# Patient Record
Sex: MALE | Race: WHITE | NOT HISPANIC OR LATINO | Employment: OTHER | URBAN - METROPOLITAN AREA
[De-identification: names, ages, dates, MRNs, and addresses within clinical notes are randomized per-mention and may not be internally consistent; named-entity substitution may affect disease eponyms.]

---

## 2017-02-13 ENCOUNTER — AMBULATORY - HEALTHEAST (OUTPATIENT)
Dept: PHYSICAL MEDICINE AND REHAB | Facility: CLINIC | Age: 72
End: 2017-02-13

## 2017-04-06 ENCOUNTER — HOSPITAL ENCOUNTER (OUTPATIENT)
Dept: PHYSICAL MEDICINE AND REHAB | Facility: CLINIC | Age: 72
Discharge: HOME | End: 2017-04-06
Attending: NURSE PRACTITIONER

## 2017-04-06 DIAGNOSIS — M53.3 CHRONIC SI JOINT PAIN: ICD-10-CM

## 2017-04-06 DIAGNOSIS — M54.42 CHRONIC LEFT-SIDED LOW BACK PAIN WITH LEFT-SIDED SCIATICA: ICD-10-CM

## 2017-04-06 DIAGNOSIS — G89.29 CHRONIC SI JOINT PAIN: ICD-10-CM

## 2017-04-06 DIAGNOSIS — G89.29 CHRONIC LEFT-SIDED LOW BACK PAIN WITH LEFT-SIDED SCIATICA: ICD-10-CM

## 2017-04-06 DIAGNOSIS — M43.16 SPONDYLOLISTHESIS OF LUMBAR REGION: ICD-10-CM

## 2017-04-10 ENCOUNTER — HOSPITAL ENCOUNTER (OUTPATIENT)
Dept: PHYSICAL MEDICINE AND REHAB | Facility: CLINIC | Age: 72
Discharge: HOME | End: 2017-04-10
Attending: PHYSICAL MEDICINE & REHABILITATION

## 2017-04-10 DIAGNOSIS — G89.29 CHRONIC LEFT-SIDED LOW BACK PAIN WITH LEFT-SIDED SCIATICA: ICD-10-CM

## 2017-04-10 DIAGNOSIS — G89.29 CHRONIC SI JOINT PAIN: ICD-10-CM

## 2017-04-10 DIAGNOSIS — M53.3 CHRONIC SI JOINT PAIN: ICD-10-CM

## 2017-04-10 DIAGNOSIS — M54.42 CHRONIC LEFT-SIDED LOW BACK PAIN WITH LEFT-SIDED SCIATICA: ICD-10-CM

## 2017-05-01 ENCOUNTER — HOSPITAL ENCOUNTER (OUTPATIENT)
Dept: PHYSICAL MEDICINE AND REHAB | Facility: CLINIC | Age: 72
Discharge: HOME | End: 2017-05-01
Attending: PHYSICIAN ASSISTANT

## 2017-05-01 DIAGNOSIS — M54.16 LUMBAR RADICULITIS: ICD-10-CM

## 2017-05-01 DIAGNOSIS — M47.816 LUMBAR FACET ARTHROPATHY: ICD-10-CM

## 2017-05-01 DIAGNOSIS — Z98.1 S/P LUMBAR FUSION: ICD-10-CM

## 2017-05-04 ENCOUNTER — AMBULATORY - HEALTHEAST (OUTPATIENT)
Dept: PHYSICAL MEDICINE AND REHAB | Facility: CLINIC | Age: 72
End: 2017-05-04

## 2017-05-05 ENCOUNTER — COMMUNICATION - HEALTHEAST (OUTPATIENT)
Dept: PHYSICAL MEDICINE AND REHAB | Facility: CLINIC | Age: 72
End: 2017-05-05

## 2017-05-05 ENCOUNTER — AMBULATORY - HEALTHEAST (OUTPATIENT)
Dept: PHYSICAL MEDICINE AND REHAB | Facility: CLINIC | Age: 72
End: 2017-05-05

## 2017-05-09 ENCOUNTER — HOSPITAL ENCOUNTER (OUTPATIENT)
Dept: PHYSICAL MEDICINE AND REHAB | Facility: CLINIC | Age: 72
Discharge: HOME | End: 2017-05-09
Attending: PHYSICIAN ASSISTANT

## 2017-05-09 DIAGNOSIS — M54.16 LUMBAR RADICULAR PAIN: ICD-10-CM

## 2017-05-19 ENCOUNTER — RECORDS - HEALTHEAST (OUTPATIENT)
Dept: LAB | Facility: CLINIC | Age: 72
End: 2017-05-19

## 2017-05-19 ENCOUNTER — RECORDS - HEALTHEAST (OUTPATIENT)
Dept: ADMINISTRATIVE | Facility: OTHER | Age: 72
End: 2017-05-19

## 2017-05-19 LAB
CHOLEST SERPL-MCNC: 129 MG/DL
FASTING STATUS PATIENT QL REPORTED: NORMAL
HDLC SERPL-MCNC: 53 MG/DL
LDLC SERPL CALC-MCNC: 60 MG/DL
TRIGL SERPL-MCNC: 78 MG/DL

## 2017-06-07 ENCOUNTER — AMBULATORY - HEALTHEAST (OUTPATIENT)
Dept: INFUSION THERAPY | Facility: HOSPITAL | Age: 72
End: 2017-06-07

## 2017-06-07 DIAGNOSIS — C61 PROSTATE CANCER (H): ICD-10-CM

## 2017-06-07 LAB — PSA SERPL-MCNC: <0.1 NG/ML (ref 0–6.5)

## 2017-06-08 ENCOUNTER — OFFICE VISIT - HEALTHEAST (OUTPATIENT)
Dept: RADIATION ONCOLOGY | Facility: HOSPITAL | Age: 72
End: 2017-06-08

## 2017-06-08 DIAGNOSIS — C61 PROSTATE CANCER (H): ICD-10-CM

## 2017-06-08 ASSESSMENT — MIFFLIN-ST. JEOR: SCORE: 1741.45

## 2017-06-26 ENCOUNTER — OFFICE VISIT - HEALTHEAST (OUTPATIENT)
Dept: PHYSICAL THERAPY | Facility: REHABILITATION | Age: 72
End: 2017-06-26

## 2017-06-26 ENCOUNTER — HOSPITAL ENCOUNTER (OUTPATIENT)
Dept: PHYSICAL MEDICINE AND REHAB | Facility: CLINIC | Age: 72
Discharge: HOME | End: 2017-06-26
Attending: NURSE PRACTITIONER

## 2017-06-26 DIAGNOSIS — G89.29 CHRONIC SI JOINT PAIN: ICD-10-CM

## 2017-06-26 DIAGNOSIS — M54.42 CHRONIC LEFT-SIDED LOW BACK PAIN WITH LEFT-SIDED SCIATICA: ICD-10-CM

## 2017-06-26 DIAGNOSIS — G89.29 CHRONIC LEFT-SIDED LOW BACK PAIN WITH LEFT-SIDED SCIATICA: ICD-10-CM

## 2017-06-26 DIAGNOSIS — M47.816 LUMBAR FACET ARTHROPATHY: ICD-10-CM

## 2017-06-26 DIAGNOSIS — Z98.1 S/P LUMBAR FUSION: ICD-10-CM

## 2017-06-26 DIAGNOSIS — M62.81 GENERALIZED MUSCLE WEAKNESS: ICD-10-CM

## 2017-06-26 DIAGNOSIS — M53.3 CHRONIC SI JOINT PAIN: ICD-10-CM

## 2017-06-26 DIAGNOSIS — M53.86 DECREASED ROM OF LUMBAR SPINE: ICD-10-CM

## 2017-06-27 ENCOUNTER — AMBULATORY - HEALTHEAST (OUTPATIENT)
Dept: PHYSICAL MEDICINE AND REHAB | Facility: CLINIC | Age: 72
End: 2017-06-27

## 2017-07-03 ENCOUNTER — OFFICE VISIT - HEALTHEAST (OUTPATIENT)
Dept: PHYSICAL THERAPY | Facility: REHABILITATION | Age: 72
End: 2017-07-03

## 2017-07-03 DIAGNOSIS — G89.29 CHRONIC LEFT-SIDED LOW BACK PAIN WITH LEFT-SIDED SCIATICA: ICD-10-CM

## 2017-07-03 DIAGNOSIS — M53.86 DECREASED ROM OF LUMBAR SPINE: ICD-10-CM

## 2017-07-03 DIAGNOSIS — M62.81 GENERALIZED MUSCLE WEAKNESS: ICD-10-CM

## 2017-07-03 DIAGNOSIS — M54.42 CHRONIC LEFT-SIDED LOW BACK PAIN WITH LEFT-SIDED SCIATICA: ICD-10-CM

## 2017-07-10 ENCOUNTER — OFFICE VISIT - HEALTHEAST (OUTPATIENT)
Dept: PHYSICAL THERAPY | Facility: REHABILITATION | Age: 72
End: 2017-07-10

## 2017-07-10 DIAGNOSIS — G89.29 CHRONIC LEFT-SIDED LOW BACK PAIN WITH LEFT-SIDED SCIATICA: ICD-10-CM

## 2017-07-10 DIAGNOSIS — M53.86 DECREASED ROM OF LUMBAR SPINE: ICD-10-CM

## 2017-07-10 DIAGNOSIS — M54.42 CHRONIC LEFT-SIDED LOW BACK PAIN WITH LEFT-SIDED SCIATICA: ICD-10-CM

## 2017-07-10 DIAGNOSIS — M62.81 GENERALIZED MUSCLE WEAKNESS: ICD-10-CM

## 2017-07-20 ENCOUNTER — COMMUNICATION - HEALTHEAST (OUTPATIENT)
Dept: RADIATION ONCOLOGY | Age: 72
End: 2017-07-20

## 2017-07-20 DIAGNOSIS — C61 PROSTATE CANCER (H): ICD-10-CM

## 2017-07-24 ENCOUNTER — OFFICE VISIT - HEALTHEAST (OUTPATIENT)
Dept: PHYSICAL THERAPY | Facility: REHABILITATION | Age: 72
End: 2017-07-24

## 2017-07-24 DIAGNOSIS — G89.29 CHRONIC LEFT-SIDED LOW BACK PAIN WITH LEFT-SIDED SCIATICA: ICD-10-CM

## 2017-07-24 DIAGNOSIS — M54.42 CHRONIC LEFT-SIDED LOW BACK PAIN WITH LEFT-SIDED SCIATICA: ICD-10-CM

## 2017-07-24 DIAGNOSIS — M53.86 DECREASED ROM OF LUMBAR SPINE: ICD-10-CM

## 2017-07-24 DIAGNOSIS — M62.81 GENERALIZED MUSCLE WEAKNESS: ICD-10-CM

## 2017-07-27 ENCOUNTER — COMMUNICATION - HEALTHEAST (OUTPATIENT)
Dept: RADIATION ONCOLOGY | Facility: CLINIC | Age: 72
End: 2017-07-27

## 2017-07-28 ENCOUNTER — COMMUNICATION - HEALTHEAST (OUTPATIENT)
Dept: RADIATION ONCOLOGY | Facility: HOSPITAL | Age: 72
End: 2017-07-28

## 2017-07-28 ENCOUNTER — AMBULATORY - HEALTHEAST (OUTPATIENT)
Dept: RADIATION ONCOLOGY | Facility: HOSPITAL | Age: 72
End: 2017-07-28

## 2017-07-28 DIAGNOSIS — C61 PROSTATE CANCER (H): ICD-10-CM

## 2017-07-28 RX ORDER — TAMSULOSIN HYDROCHLORIDE 0.4 MG/1
0.4 CAPSULE ORAL 2 TIMES DAILY
Qty: 180 CAPSULE | Refills: 4 | Status: ON HOLD | OUTPATIENT
Start: 2017-07-28 | End: 2022-09-29

## 2017-08-03 ENCOUNTER — HOSPITAL ENCOUNTER (OUTPATIENT)
Dept: PHYSICAL MEDICINE AND REHAB | Facility: CLINIC | Age: 72
Discharge: HOME | End: 2017-08-03
Attending: NURSE PRACTITIONER

## 2017-08-03 DIAGNOSIS — G89.29 CHRONIC SI JOINT PAIN: ICD-10-CM

## 2017-08-03 DIAGNOSIS — M54.42 CHRONIC LEFT-SIDED LOW BACK PAIN WITH LEFT-SIDED SCIATICA: ICD-10-CM

## 2017-08-03 DIAGNOSIS — Z98.1 S/P LUMBAR FUSION: ICD-10-CM

## 2017-08-03 DIAGNOSIS — G89.29 CHRONIC LEFT-SIDED LOW BACK PAIN WITH LEFT-SIDED SCIATICA: ICD-10-CM

## 2017-08-03 DIAGNOSIS — M53.3 CHRONIC SI JOINT PAIN: ICD-10-CM

## 2017-08-04 ENCOUNTER — COMMUNICATION - HEALTHEAST (OUTPATIENT)
Dept: PHYSICAL MEDICINE AND REHAB | Facility: CLINIC | Age: 72
End: 2017-08-04

## 2017-08-04 DIAGNOSIS — R25.2 MUSCLE CRAMPING: ICD-10-CM

## 2017-08-04 DIAGNOSIS — M48.061 FORAMINAL STENOSIS OF LUMBAR REGION: ICD-10-CM

## 2017-08-08 ENCOUNTER — HOSPITAL ENCOUNTER (OUTPATIENT)
Dept: PHYSICAL MEDICINE AND REHAB | Facility: CLINIC | Age: 72
Discharge: HOME | End: 2017-08-08
Attending: PHYSICAL MEDICINE & REHABILITATION

## 2017-08-08 DIAGNOSIS — M53.3 CHRONIC SI JOINT PAIN: ICD-10-CM

## 2017-08-08 DIAGNOSIS — G89.29 CHRONIC SI JOINT PAIN: ICD-10-CM

## 2017-08-08 DIAGNOSIS — G89.29 CHRONIC LEFT-SIDED LOW BACK PAIN WITH LEFT-SIDED SCIATICA: ICD-10-CM

## 2017-08-08 DIAGNOSIS — M54.42 CHRONIC LEFT-SIDED LOW BACK PAIN WITH LEFT-SIDED SCIATICA: ICD-10-CM

## 2017-08-23 ENCOUNTER — COMMUNICATION - HEALTHEAST (OUTPATIENT)
Dept: CARDIOLOGY | Facility: CLINIC | Age: 72
End: 2017-08-23

## 2017-08-23 DIAGNOSIS — E78.5 HYPERLIPIDEMIA: ICD-10-CM

## 2017-08-27 ENCOUNTER — COMMUNICATION - HEALTHEAST (OUTPATIENT)
Dept: CARDIOLOGY | Facility: CLINIC | Age: 72
End: 2017-08-27

## 2017-08-27 DIAGNOSIS — I25.10 CAD (CORONARY ARTERY DISEASE): ICD-10-CM

## 2017-08-31 ENCOUNTER — COMMUNICATION - HEALTHEAST (OUTPATIENT)
Dept: PHYSICAL MEDICINE AND REHAB | Facility: CLINIC | Age: 72
End: 2017-08-31

## 2017-09-01 ENCOUNTER — AMBULATORY - HEALTHEAST (OUTPATIENT)
Dept: PHYSICAL MEDICINE AND REHAB | Facility: CLINIC | Age: 72
End: 2017-09-01

## 2017-09-01 DIAGNOSIS — M53.3 CHRONIC SI JOINT PAIN: ICD-10-CM

## 2017-09-01 DIAGNOSIS — Z98.1 S/P LUMBAR FUSION: ICD-10-CM

## 2017-09-01 DIAGNOSIS — G89.29 CHRONIC SI JOINT PAIN: ICD-10-CM

## 2017-09-01 DIAGNOSIS — M47.816 LUMBAR FACET ARTHROPATHY: ICD-10-CM

## 2017-09-01 DIAGNOSIS — M16.12 OSTEOARTHRITIS OF LEFT HIP: ICD-10-CM

## 2017-09-01 DIAGNOSIS — Z98.1 HISTORY OF LUMBAR FUSION: ICD-10-CM

## 2017-10-05 ENCOUNTER — HOSPITAL ENCOUNTER (OUTPATIENT)
Dept: PHYSICAL MEDICINE AND REHAB | Facility: CLINIC | Age: 72
Discharge: HOME | End: 2017-10-05
Attending: NURSE PRACTITIONER

## 2017-10-05 ENCOUNTER — HOSPITAL ENCOUNTER (OUTPATIENT)
Dept: PHYSICAL MEDICINE AND REHAB | Facility: CLINIC | Age: 72
Discharge: HOME | End: 2017-10-05
Attending: PHYSICAL MEDICINE & REHABILITATION

## 2017-10-05 DIAGNOSIS — M53.3 PAIN OF LEFT SACROILIAC JOINT: ICD-10-CM

## 2017-10-05 DIAGNOSIS — M53.3 CHRONIC SI JOINT PAIN: ICD-10-CM

## 2017-10-05 DIAGNOSIS — M54.50 CHRONIC LEFT-SIDED LOW BACK PAIN WITHOUT SCIATICA: ICD-10-CM

## 2017-10-05 DIAGNOSIS — G89.29 CHRONIC LEFT-SIDED LOW BACK PAIN WITHOUT SCIATICA: ICD-10-CM

## 2017-10-05 DIAGNOSIS — Z98.1 HISTORY OF LUMBAR FUSION: ICD-10-CM

## 2017-10-05 DIAGNOSIS — G89.29 CHRONIC SI JOINT PAIN: ICD-10-CM

## 2017-10-20 ENCOUNTER — AMBULATORY - HEALTHEAST (OUTPATIENT)
Dept: PALLIATIVE MEDICINE | Facility: OTHER | Age: 72
End: 2017-10-20

## 2017-10-20 DIAGNOSIS — M48.061 LUMBAR STENOSIS: ICD-10-CM

## 2017-10-31 ENCOUNTER — RECORDS - HEALTHEAST (OUTPATIENT)
Dept: ADMINISTRATIVE | Facility: OTHER | Age: 72
End: 2017-10-31

## 2017-11-06 ENCOUNTER — COMMUNICATION - HEALTHEAST (OUTPATIENT)
Dept: TELEHEALTH | Facility: CLINIC | Age: 72
End: 2017-11-06

## 2017-11-06 ENCOUNTER — HOSPITAL ENCOUNTER (OUTPATIENT)
Dept: PALLIATIVE MEDICINE | Facility: OTHER | Age: 72
Discharge: HOME | End: 2017-11-06
Attending: FAMILY MEDICINE

## 2017-11-06 DIAGNOSIS — M53.3 SACROILIAC JOINT DYSFUNCTION: ICD-10-CM

## 2017-11-06 DIAGNOSIS — M54.50 LUMBAR SPINE PAIN: ICD-10-CM

## 2017-11-06 ASSESSMENT — MIFFLIN-ST. JEOR: SCORE: 1696.54

## 2017-11-14 ENCOUNTER — COMMUNICATION - HEALTHEAST (OUTPATIENT)
Dept: PALLIATIVE MEDICINE | Facility: OTHER | Age: 72
End: 2017-11-14

## 2017-11-16 ENCOUNTER — COMMUNICATION - HEALTHEAST (OUTPATIENT)
Dept: PALLIATIVE MEDICINE | Facility: OTHER | Age: 72
End: 2017-11-16

## 2017-11-25 ENCOUNTER — COMMUNICATION - HEALTHEAST (OUTPATIENT)
Dept: CARDIOLOGY | Facility: CLINIC | Age: 72
End: 2017-11-25

## 2017-11-25 DIAGNOSIS — I25.10 CAD (CORONARY ARTERY DISEASE): ICD-10-CM

## 2017-12-03 ENCOUNTER — COMMUNICATION - HEALTHEAST (OUTPATIENT)
Dept: PHYSICAL MEDICINE AND REHAB | Facility: CLINIC | Age: 72
End: 2017-12-03

## 2017-12-03 DIAGNOSIS — M48.061 FORAMINAL STENOSIS OF LUMBAR REGION: ICD-10-CM

## 2017-12-03 DIAGNOSIS — R25.2 MUSCLE CRAMPING: ICD-10-CM

## 2017-12-04 ENCOUNTER — HOSPITAL ENCOUNTER (OUTPATIENT)
Dept: PALLIATIVE MEDICINE | Facility: OTHER | Age: 72
Discharge: HOME | End: 2017-12-04
Attending: PSYCHIATRY & NEUROLOGY

## 2017-12-04 DIAGNOSIS — M53.3 SACROILIAC DYSFUNCTION: ICD-10-CM

## 2017-12-04 DIAGNOSIS — M79.18 MYOFASCIAL PAIN: ICD-10-CM

## 2017-12-04 DIAGNOSIS — M51.26 LUMBAR DISC HERNIATION: ICD-10-CM

## 2017-12-04 ASSESSMENT — MIFFLIN-ST. JEOR: SCORE: 1696.54

## 2017-12-05 ENCOUNTER — HOSPITAL ENCOUNTER (OUTPATIENT)
Dept: PALLIATIVE MEDICINE | Facility: OTHER | Age: 72
Discharge: HOME | End: 2017-12-05
Attending: PSYCHIATRY & NEUROLOGY

## 2017-12-05 DIAGNOSIS — M79.18 MYOFASCIAL MUSCLE PAIN: ICD-10-CM

## 2017-12-05 DIAGNOSIS — M79.18 MYOFASCIAL PAIN: ICD-10-CM

## 2017-12-05 ASSESSMENT — MIFFLIN-ST. JEOR: SCORE: 1696.54

## 2017-12-19 ENCOUNTER — RECORDS - HEALTHEAST (OUTPATIENT)
Dept: ADMINISTRATIVE | Facility: OTHER | Age: 72
End: 2017-12-19

## 2018-01-08 ENCOUNTER — HOSPITAL ENCOUNTER (OUTPATIENT)
Dept: PALLIATIVE MEDICINE | Facility: OTHER | Age: 73
Discharge: HOME | End: 2018-01-08
Attending: PSYCHIATRY & NEUROLOGY

## 2018-01-08 DIAGNOSIS — M53.3 SACROILIAC JOINT DYSFUNCTION: ICD-10-CM

## 2018-01-08 ASSESSMENT — MIFFLIN-ST. JEOR: SCORE: 1712.41

## 2018-01-10 ENCOUNTER — COMMUNICATION - HEALTHEAST (OUTPATIENT)
Dept: PALLIATIVE MEDICINE | Facility: OTHER | Age: 73
End: 2018-01-10

## 2018-01-11 ENCOUNTER — HOSPITAL ENCOUNTER (OUTPATIENT)
Dept: PALLIATIVE MEDICINE | Facility: OTHER | Age: 73
Discharge: HOME | End: 2018-01-11
Attending: PSYCHIATRY & NEUROLOGY

## 2018-01-11 DIAGNOSIS — M53.3 SACROILIAC JOINT DYSFUNCTION: ICD-10-CM

## 2018-01-11 ASSESSMENT — MIFFLIN-ST. JEOR: SCORE: 1712.41

## 2018-01-12 ENCOUNTER — COMMUNICATION - HEALTHEAST (OUTPATIENT)
Dept: PALLIATIVE MEDICINE | Facility: OTHER | Age: 73
End: 2018-01-12

## 2018-01-18 ENCOUNTER — COMMUNICATION - HEALTHEAST (OUTPATIENT)
Dept: PALLIATIVE MEDICINE | Facility: OTHER | Age: 73
End: 2018-01-18

## 2018-01-18 DIAGNOSIS — M46.1 SACROILIITIS (H): ICD-10-CM

## 2018-01-24 ENCOUNTER — HOSPITAL ENCOUNTER (OUTPATIENT)
Dept: PALLIATIVE MEDICINE | Facility: OTHER | Age: 73
Discharge: HOME | End: 2018-01-24
Attending: PSYCHIATRY & NEUROLOGY | Admitting: PSYCHIATRY & NEUROLOGY

## 2018-01-24 DIAGNOSIS — M46.1 SACROILIITIS (H): ICD-10-CM

## 2018-01-24 DIAGNOSIS — M53.3 SACROILIAC JOINT DYSFUNCTION OF LEFT SIDE: ICD-10-CM

## 2018-01-24 ASSESSMENT — MIFFLIN-ST. JEOR: SCORE: 1703.34

## 2018-01-25 ENCOUNTER — COMMUNICATION - HEALTHEAST (OUTPATIENT)
Dept: PALLIATIVE MEDICINE | Facility: OTHER | Age: 73
End: 2018-01-25

## 2018-02-02 ENCOUNTER — COMMUNICATION - HEALTHEAST (OUTPATIENT)
Dept: PALLIATIVE MEDICINE | Facility: OTHER | Age: 73
End: 2018-02-02

## 2018-02-12 ENCOUNTER — COMMUNICATION - HEALTHEAST (OUTPATIENT)
Dept: CARDIOLOGY | Facility: CLINIC | Age: 73
End: 2018-02-12

## 2018-02-12 ENCOUNTER — HOSPITAL ENCOUNTER (OUTPATIENT)
Dept: PALLIATIVE MEDICINE | Facility: OTHER | Age: 73
Discharge: HOME | End: 2018-02-12
Attending: PSYCHIATRY & NEUROLOGY

## 2018-02-12 ENCOUNTER — COMMUNICATION - HEALTHEAST (OUTPATIENT)
Dept: PHYSICAL MEDICINE AND REHAB | Facility: CLINIC | Age: 73
End: 2018-02-12

## 2018-02-12 DIAGNOSIS — M46.1 SACROILIAC INFLAMMATION (H): ICD-10-CM

## 2018-02-12 DIAGNOSIS — R25.2 MUSCLE CRAMPING: ICD-10-CM

## 2018-02-12 DIAGNOSIS — M96.1 FAILED BACK SYNDROME, LUMBAR: ICD-10-CM

## 2018-02-12 DIAGNOSIS — I25.10 CAD (CORONARY ARTERY DISEASE): ICD-10-CM

## 2018-02-12 DIAGNOSIS — M48.061 FORAMINAL STENOSIS OF LUMBAR REGION: ICD-10-CM

## 2018-02-12 ASSESSMENT — MIFFLIN-ST. JEOR: SCORE: 1696.54

## 2018-02-16 ENCOUNTER — HOSPITAL ENCOUNTER (OUTPATIENT)
Dept: PALLIATIVE MEDICINE | Facility: OTHER | Age: 73
Discharge: HOME | End: 2018-02-16
Attending: PSYCHIATRY & NEUROLOGY | Admitting: PSYCHIATRY & NEUROLOGY

## 2018-02-16 DIAGNOSIS — M53.3 SACROILIAC JOINT DYSFUNCTION: ICD-10-CM

## 2018-02-16 DIAGNOSIS — M46.1 SACROILIAC INFLAMMATION (H): ICD-10-CM

## 2018-02-16 ASSESSMENT — MIFFLIN-ST. JEOR: SCORE: 1696.54

## 2018-02-19 ENCOUNTER — COMMUNICATION - HEALTHEAST (OUTPATIENT)
Dept: PALLIATIVE MEDICINE | Facility: OTHER | Age: 73
End: 2018-02-19

## 2018-02-19 ENCOUNTER — COMMUNICATION - HEALTHEAST (OUTPATIENT)
Dept: PALLIATIVE MEDICINE | Facility: CLINIC | Age: 73
End: 2018-02-19

## 2018-02-20 ENCOUNTER — RECORDS - HEALTHEAST (OUTPATIENT)
Dept: LAB | Facility: CLINIC | Age: 73
End: 2018-02-20

## 2018-02-20 LAB
ALBUMIN SERPL-MCNC: 3.9 G/DL (ref 3.5–5)
ALP SERPL-CCNC: 67 U/L (ref 45–120)
ALT SERPL W P-5'-P-CCNC: 20 U/L (ref 0–45)
ANION GAP SERPL CALCULATED.3IONS-SCNC: 11 MMOL/L (ref 5–18)
AST SERPL W P-5'-P-CCNC: 13 U/L (ref 0–40)
BILIRUB SERPL-MCNC: 1.2 MG/DL (ref 0–1)
BUN SERPL-MCNC: 22 MG/DL (ref 8–28)
CALCIUM SERPL-MCNC: 9.2 MG/DL (ref 8.5–10.5)
CHLORIDE BLD-SCNC: 102 MMOL/L (ref 98–107)
CHOLEST SERPL-MCNC: 147 MG/DL
CO2 SERPL-SCNC: 28 MMOL/L (ref 22–31)
CREAT SERPL-MCNC: 0.82 MG/DL (ref 0.7–1.3)
FASTING STATUS PATIENT QL REPORTED: NORMAL
GFR SERPL CREATININE-BSD FRML MDRD: >60 ML/MIN/1.73M2
GLUCOSE BLD-MCNC: 92 MG/DL (ref 70–125)
HDLC SERPL-MCNC: 55 MG/DL
LDLC SERPL CALC-MCNC: 68 MG/DL
POTASSIUM BLD-SCNC: 4.7 MMOL/L (ref 3.5–5)
PROT SERPL-MCNC: 6.6 G/DL (ref 6–8)
PSA SERPL-MCNC: <0.1 NG/ML (ref 0–6.5)
SODIUM SERPL-SCNC: 141 MMOL/L (ref 136–145)
TRIGL SERPL-MCNC: 120 MG/DL

## 2018-02-21 ENCOUNTER — AMBULATORY - HEALTHEAST (OUTPATIENT)
Dept: CARDIOLOGY | Facility: CLINIC | Age: 73
End: 2018-02-21

## 2018-02-21 ENCOUNTER — COMMUNICATION - HEALTHEAST (OUTPATIENT)
Dept: CARDIOLOGY | Facility: CLINIC | Age: 73
End: 2018-02-21

## 2018-02-21 DIAGNOSIS — I25.10 CAD (CORONARY ARTERY DISEASE): ICD-10-CM

## 2018-03-09 ENCOUNTER — AMBULATORY - HEALTHEAST (OUTPATIENT)
Dept: CARDIOLOGY | Facility: CLINIC | Age: 73
End: 2018-03-09

## 2018-03-09 DIAGNOSIS — I25.10 CAD (CORONARY ARTERY DISEASE): ICD-10-CM

## 2018-03-09 LAB
ALBUMIN SERPL-MCNC: 3.9 G/DL (ref 3.5–5)
ALP SERPL-CCNC: 62 U/L (ref 45–120)
ALT SERPL W P-5'-P-CCNC: 18 U/L (ref 0–45)
ANION GAP SERPL CALCULATED.3IONS-SCNC: 8 MMOL/L (ref 5–18)
AST SERPL W P-5'-P-CCNC: 13 U/L (ref 0–40)
BILIRUB SERPL-MCNC: 1.3 MG/DL (ref 0–1)
BUN SERPL-MCNC: 19 MG/DL (ref 8–28)
CALCIUM SERPL-MCNC: 9.7 MG/DL (ref 8.5–10.5)
CHLORIDE BLD-SCNC: 104 MMOL/L (ref 98–107)
CHOLEST SERPL-MCNC: 158 MG/DL
CO2 SERPL-SCNC: 30 MMOL/L (ref 22–31)
CREAT SERPL-MCNC: 0.8 MG/DL (ref 0.7–1.3)
FASTING STATUS PATIENT QL REPORTED: YES
GFR SERPL CREATININE-BSD FRML MDRD: >60 ML/MIN/1.73M2
GLUCOSE BLD-MCNC: 91 MG/DL (ref 70–125)
HDLC SERPL-MCNC: 60 MG/DL
LDLC SERPL CALC-MCNC: 80 MG/DL
POTASSIUM BLD-SCNC: 3.9 MMOL/L (ref 3.5–5)
PROT SERPL-MCNC: 6.5 G/DL (ref 6–8)
SODIUM SERPL-SCNC: 142 MMOL/L (ref 136–145)
TRIGL SERPL-MCNC: 92 MG/DL

## 2018-03-12 ENCOUNTER — HOSPITAL ENCOUNTER (OUTPATIENT)
Dept: PALLIATIVE MEDICINE | Facility: OTHER | Age: 73
Discharge: HOME | End: 2018-03-12
Attending: PSYCHIATRY & NEUROLOGY

## 2018-03-12 ENCOUNTER — OFFICE VISIT - HEALTHEAST (OUTPATIENT)
Dept: PHYSICAL THERAPY | Facility: REHABILITATION | Age: 73
End: 2018-03-12

## 2018-03-12 DIAGNOSIS — M47.816 LUMBAR SPONDYLOSIS: ICD-10-CM

## 2018-03-12 DIAGNOSIS — M51.16 LUMBAR DISC DISEASE WITH RADICULOPATHY: ICD-10-CM

## 2018-03-12 DIAGNOSIS — M62.81 GENERALIZED MUSCLE WEAKNESS: ICD-10-CM

## 2018-03-12 DIAGNOSIS — M25.552 CHRONIC LEFT HIP PAIN: ICD-10-CM

## 2018-03-12 DIAGNOSIS — M53.3 SACROILIAC JOINT DYSFUNCTION: ICD-10-CM

## 2018-03-12 DIAGNOSIS — G89.29 CHRONIC LOWER BACK PAIN: ICD-10-CM

## 2018-03-12 DIAGNOSIS — G89.29 CHRONIC LEFT-SIDED LOW BACK PAIN WITH LEFT-SIDED SCIATICA: ICD-10-CM

## 2018-03-12 DIAGNOSIS — G89.29 CHRONIC LEFT HIP PAIN: ICD-10-CM

## 2018-03-12 DIAGNOSIS — M54.42 CHRONIC LEFT-SIDED LOW BACK PAIN WITH LEFT-SIDED SCIATICA: ICD-10-CM

## 2018-03-12 DIAGNOSIS — M54.50 CHRONIC LOWER BACK PAIN: ICD-10-CM

## 2018-03-12 DIAGNOSIS — R26.9 ABNORMALITY OF GAIT: ICD-10-CM

## 2018-03-12 ASSESSMENT — MIFFLIN-ST. JEOR: SCORE: 1696.54

## 2018-03-13 ENCOUNTER — AMBULATORY - HEALTHEAST (OUTPATIENT)
Dept: CARDIOLOGY | Facility: CLINIC | Age: 73
End: 2018-03-13

## 2018-03-13 ENCOUNTER — RECORDS - HEALTHEAST (OUTPATIENT)
Dept: ADMINISTRATIVE | Facility: OTHER | Age: 73
End: 2018-03-13

## 2018-03-15 ENCOUNTER — OFFICE VISIT - HEALTHEAST (OUTPATIENT)
Dept: PHYSICAL THERAPY | Facility: REHABILITATION | Age: 73
End: 2018-03-15

## 2018-03-15 DIAGNOSIS — M62.81 GENERALIZED MUSCLE WEAKNESS: ICD-10-CM

## 2018-03-15 DIAGNOSIS — M25.552 CHRONIC LEFT HIP PAIN: ICD-10-CM

## 2018-03-15 DIAGNOSIS — M54.42 CHRONIC LEFT-SIDED LOW BACK PAIN WITH LEFT-SIDED SCIATICA: ICD-10-CM

## 2018-03-15 DIAGNOSIS — M54.50 CHRONIC LOWER BACK PAIN: ICD-10-CM

## 2018-03-15 DIAGNOSIS — G89.29 CHRONIC LEFT-SIDED LOW BACK PAIN WITH LEFT-SIDED SCIATICA: ICD-10-CM

## 2018-03-15 DIAGNOSIS — R26.9 ABNORMALITY OF GAIT: ICD-10-CM

## 2018-03-15 DIAGNOSIS — G89.29 CHRONIC LEFT HIP PAIN: ICD-10-CM

## 2018-03-15 DIAGNOSIS — G89.29 CHRONIC LOWER BACK PAIN: ICD-10-CM

## 2018-03-19 ENCOUNTER — OFFICE VISIT - HEALTHEAST (OUTPATIENT)
Dept: CARDIOLOGY | Facility: CLINIC | Age: 73
End: 2018-03-19

## 2018-03-19 DIAGNOSIS — I10 ESSENTIAL HYPERTENSION: ICD-10-CM

## 2018-03-19 DIAGNOSIS — E78.5 DYSLIPIDEMIA: ICD-10-CM

## 2018-03-19 DIAGNOSIS — I25.10 CORONARY ARTERY DISEASE INVOLVING NATIVE CORONARY ARTERY OF NATIVE HEART WITHOUT ANGINA PECTORIS: ICD-10-CM

## 2018-03-19 ASSESSMENT — MIFFLIN-ST. JEOR: SCORE: 1744.17

## 2018-03-22 ENCOUNTER — COMMUNICATION - HEALTHEAST (OUTPATIENT)
Dept: PHYSICAL MEDICINE AND REHAB | Facility: CLINIC | Age: 73
End: 2018-03-22

## 2018-03-22 DIAGNOSIS — M48.061 FORAMINAL STENOSIS OF LUMBAR REGION: ICD-10-CM

## 2018-03-22 DIAGNOSIS — R25.2 MUSCLE CRAMPING: ICD-10-CM

## 2018-03-27 ENCOUNTER — COMMUNICATION - HEALTHEAST (OUTPATIENT)
Dept: PALLIATIVE MEDICINE | Facility: OTHER | Age: 73
End: 2018-03-27

## 2018-03-29 ENCOUNTER — OFFICE VISIT - HEALTHEAST (OUTPATIENT)
Dept: PHYSICAL THERAPY | Facility: REHABILITATION | Age: 73
End: 2018-03-29

## 2018-03-29 DIAGNOSIS — M54.42 CHRONIC LEFT-SIDED LOW BACK PAIN WITH LEFT-SIDED SCIATICA: ICD-10-CM

## 2018-03-29 DIAGNOSIS — M25.552 CHRONIC LEFT HIP PAIN: ICD-10-CM

## 2018-03-29 DIAGNOSIS — G89.29 CHRONIC LEFT-SIDED LOW BACK PAIN WITH LEFT-SIDED SCIATICA: ICD-10-CM

## 2018-03-29 DIAGNOSIS — G89.29 CHRONIC LEFT HIP PAIN: ICD-10-CM

## 2018-03-29 DIAGNOSIS — M53.86 DECREASED ROM OF LUMBAR SPINE: ICD-10-CM

## 2018-03-29 DIAGNOSIS — R26.9 ABNORMALITY OF GAIT: ICD-10-CM

## 2018-03-29 DIAGNOSIS — M62.81 GENERALIZED MUSCLE WEAKNESS: ICD-10-CM

## 2018-04-05 ENCOUNTER — OFFICE VISIT - HEALTHEAST (OUTPATIENT)
Dept: PHYSICAL THERAPY | Facility: REHABILITATION | Age: 73
End: 2018-04-05

## 2018-04-05 DIAGNOSIS — M62.81 GENERALIZED MUSCLE WEAKNESS: ICD-10-CM

## 2018-04-05 DIAGNOSIS — M53.86 DECREASED ROM OF LUMBAR SPINE: ICD-10-CM

## 2018-04-05 DIAGNOSIS — G89.29 CHRONIC LEFT HIP PAIN: ICD-10-CM

## 2018-04-05 DIAGNOSIS — M25.552 CHRONIC LEFT HIP PAIN: ICD-10-CM

## 2018-04-05 DIAGNOSIS — M54.42 CHRONIC LEFT-SIDED LOW BACK PAIN WITH LEFT-SIDED SCIATICA: ICD-10-CM

## 2018-04-05 DIAGNOSIS — R26.9 ABNORMALITY OF GAIT: ICD-10-CM

## 2018-04-05 DIAGNOSIS — G89.29 CHRONIC LEFT-SIDED LOW BACK PAIN WITH LEFT-SIDED SCIATICA: ICD-10-CM

## 2018-04-10 ENCOUNTER — HOSPITAL ENCOUNTER (OUTPATIENT)
Dept: PALLIATIVE MEDICINE | Facility: OTHER | Age: 73
Discharge: HOME | End: 2018-04-10
Attending: PSYCHIATRY & NEUROLOGY

## 2018-04-10 ENCOUNTER — COMMUNICATION - HEALTHEAST (OUTPATIENT)
Dept: PALLIATIVE MEDICINE | Facility: OTHER | Age: 73
End: 2018-04-10

## 2018-04-10 ENCOUNTER — RECORDS - HEALTHEAST (OUTPATIENT)
Dept: ADMINISTRATIVE | Facility: OTHER | Age: 73
End: 2018-04-10

## 2018-04-10 DIAGNOSIS — M16.12 OSTEOARTHRITIS OF LEFT HIP: ICD-10-CM

## 2018-04-10 DIAGNOSIS — M96.1 FAILED BACK SYNDROME, LUMBAR: ICD-10-CM

## 2018-04-10 DIAGNOSIS — G89.4 CHRONIC PAIN SYNDROME: ICD-10-CM

## 2018-04-10 DIAGNOSIS — M53.3 SACROILIAC DYSFUNCTION: ICD-10-CM

## 2018-04-10 DIAGNOSIS — G89.29 CHRONIC PAIN: ICD-10-CM

## 2018-04-10 ASSESSMENT — MIFFLIN-ST. JEOR: SCORE: 1744.17

## 2018-04-12 ENCOUNTER — OFFICE VISIT - HEALTHEAST (OUTPATIENT)
Dept: PHYSICAL THERAPY | Facility: REHABILITATION | Age: 73
End: 2018-04-12

## 2018-04-12 DIAGNOSIS — M53.86 DECREASED ROM OF LUMBAR SPINE: ICD-10-CM

## 2018-04-12 DIAGNOSIS — M25.552 CHRONIC LEFT HIP PAIN: ICD-10-CM

## 2018-04-12 DIAGNOSIS — M62.81 GENERALIZED MUSCLE WEAKNESS: ICD-10-CM

## 2018-04-12 DIAGNOSIS — G89.29 CHRONIC LEFT-SIDED LOW BACK PAIN WITH LEFT-SIDED SCIATICA: ICD-10-CM

## 2018-04-12 DIAGNOSIS — G89.29 CHRONIC LEFT HIP PAIN: ICD-10-CM

## 2018-04-12 DIAGNOSIS — M54.42 CHRONIC LEFT-SIDED LOW BACK PAIN WITH LEFT-SIDED SCIATICA: ICD-10-CM

## 2018-04-16 ENCOUNTER — HOSPITAL ENCOUNTER (OUTPATIENT)
Dept: MRI IMAGING | Facility: HOSPITAL | Age: 73
Discharge: HOME | End: 2018-04-16
Attending: PSYCHIATRY & NEUROLOGY

## 2018-04-16 DIAGNOSIS — M16.12 OSTEOARTHRITIS OF LEFT HIP: ICD-10-CM

## 2018-04-17 ENCOUNTER — COMMUNICATION - HEALTHEAST (OUTPATIENT)
Dept: PALLIATIVE MEDICINE | Facility: OTHER | Age: 73
End: 2018-04-17

## 2018-05-02 ENCOUNTER — RECORDS - HEALTHEAST (OUTPATIENT)
Dept: LAB | Facility: CLINIC | Age: 73
End: 2018-05-02

## 2018-05-02 LAB
ANION GAP SERPL CALCULATED.3IONS-SCNC: 13 MMOL/L (ref 5–18)
BUN SERPL-MCNC: 17 MG/DL (ref 8–28)
CALCIUM SERPL-MCNC: 9.2 MG/DL (ref 8.5–10.5)
CHLORIDE BLD-SCNC: 107 MMOL/L (ref 98–107)
CO2 SERPL-SCNC: 23 MMOL/L (ref 22–31)
CREAT SERPL-MCNC: 0.79 MG/DL (ref 0.7–1.3)
GFR SERPL CREATININE-BSD FRML MDRD: >60 ML/MIN/1.73M2
GLUCOSE BLD-MCNC: 108 MG/DL (ref 70–125)
POTASSIUM BLD-SCNC: 4.8 MMOL/L (ref 3.5–5)
SODIUM SERPL-SCNC: 143 MMOL/L (ref 136–145)

## 2018-05-07 ENCOUNTER — COMMUNICATION - HEALTHEAST (OUTPATIENT)
Dept: PALLIATIVE MEDICINE | Facility: OTHER | Age: 73
End: 2018-05-07

## 2018-05-08 ENCOUNTER — HOSPITAL ENCOUNTER (OUTPATIENT)
Dept: PALLIATIVE MEDICINE | Facility: OTHER | Age: 73
Discharge: HOME | End: 2018-05-08
Attending: PSYCHIATRY & NEUROLOGY

## 2018-05-08 DIAGNOSIS — M25.552 PAIN OF LEFT HIP JOINT: ICD-10-CM

## 2018-05-08 DIAGNOSIS — M19.90 OSTEOARTHRITIS: ICD-10-CM

## 2018-05-08 DIAGNOSIS — G89.29 CHRONIC PAIN: ICD-10-CM

## 2018-05-08 DIAGNOSIS — G89.4 CHRONIC PAIN SYNDROME: ICD-10-CM

## 2018-05-08 ASSESSMENT — MIFFLIN-ST. JEOR
SCORE: 1744.17
SCORE: 1735.09

## 2018-05-11 ENCOUNTER — ANESTHESIA - HEALTHEAST (OUTPATIENT)
Dept: SURGERY | Facility: CLINIC | Age: 73
End: 2018-05-11

## 2018-05-11 ENCOUNTER — SURGERY - HEALTHEAST (OUTPATIENT)
Dept: SURGERY | Facility: CLINIC | Age: 73
End: 2018-05-11

## 2018-05-11 ASSESSMENT — MIFFLIN-ST. JEOR: SCORE: 1700.62

## 2018-05-17 ENCOUNTER — COMMUNICATION - HEALTHEAST (OUTPATIENT)
Dept: CARDIOLOGY | Facility: CLINIC | Age: 73
End: 2018-05-17

## 2018-05-17 DIAGNOSIS — I25.10 CAD (CORONARY ARTERY DISEASE): ICD-10-CM

## 2019-02-07 ENCOUNTER — COMMUNICATION - HEALTHEAST (OUTPATIENT)
Dept: CARDIOLOGY | Facility: CLINIC | Age: 74
End: 2019-02-07

## 2019-02-07 DIAGNOSIS — I25.10 CAD (CORONARY ARTERY DISEASE): ICD-10-CM

## 2019-05-13 ENCOUNTER — COMMUNICATION - HEALTHEAST (OUTPATIENT)
Dept: CARDIOLOGY | Facility: CLINIC | Age: 74
End: 2019-05-13

## 2019-05-13 DIAGNOSIS — I25.10 CAD (CORONARY ARTERY DISEASE): ICD-10-CM

## 2019-05-13 RX ORDER — LISINOPRIL 5 MG/1
5 TABLET ORAL DAILY
Qty: 90 TABLET | Refills: 0 | Status: SHIPPED | OUTPATIENT
Start: 2019-05-13

## 2019-08-02 ENCOUNTER — COMMUNICATION - HEALTHEAST (OUTPATIENT)
Dept: CARDIOLOGY | Facility: CLINIC | Age: 74
End: 2019-08-02

## 2019-08-02 DIAGNOSIS — I25.10 CAD (CORONARY ARTERY DISEASE): ICD-10-CM

## 2020-03-31 ENCOUNTER — COMMUNICATION - HEALTHEAST (OUTPATIENT)
Dept: CARDIOLOGY | Facility: CLINIC | Age: 75
End: 2020-03-31

## 2020-03-31 DIAGNOSIS — I25.10 CAD (CORONARY ARTERY DISEASE): ICD-10-CM

## 2021-05-30 VITALS — BODY MASS INDEX: 35.26 KG/M2 | WEIGHT: 230 LBS

## 2021-05-31 VITALS — BODY MASS INDEX: 35.71 KG/M2 | WEIGHT: 228 LBS

## 2021-05-31 VITALS — BODY MASS INDEX: 33.04 KG/M2 | WEIGHT: 218 LBS | HEIGHT: 68 IN

## 2021-05-31 VITALS — WEIGHT: 229 LBS | BODY MASS INDEX: 35.87 KG/M2

## 2021-05-31 VITALS — BODY MASS INDEX: 34.53 KG/M2 | HEIGHT: 67 IN | WEIGHT: 220 LBS

## 2021-05-31 VITALS — BODY MASS INDEX: 36.08 KG/M2 | HEIGHT: 67 IN | WEIGHT: 229.9 LBS

## 2021-05-31 VITALS — WEIGHT: 220 LBS | BODY MASS INDEX: 33.34 KG/M2 | HEIGHT: 68 IN

## 2021-05-31 VITALS — BODY MASS INDEX: 34.53 KG/M2 | WEIGHT: 220 LBS | HEIGHT: 67 IN

## 2021-05-31 VITALS — HEIGHT: 67 IN | BODY MASS INDEX: 34.53 KG/M2 | WEIGHT: 220 LBS

## 2021-05-31 VITALS — BODY MASS INDEX: 35.55 KG/M2 | WEIGHT: 227 LBS

## 2021-06-01 VITALS — HEIGHT: 68 IN | BODY MASS INDEX: 34.4 KG/M2 | WEIGHT: 227 LBS

## 2021-06-01 VITALS — BODY MASS INDEX: 34.53 KG/M2 | HEIGHT: 67 IN | WEIGHT: 220 LBS

## 2021-06-01 VITALS — WEIGHT: 217.4 LBS | BODY MASS INDEX: 32.95 KG/M2 | HEIGHT: 68 IN

## 2021-06-01 VITALS — WEIGHT: 227 LBS | HEIGHT: 68 IN | BODY MASS INDEX: 34.4 KG/M2

## 2021-06-01 VITALS — WEIGHT: 220 LBS | BODY MASS INDEX: 34.53 KG/M2 | HEIGHT: 67 IN

## 2021-06-01 VITALS — BODY MASS INDEX: 34.4 KG/M2 | HEIGHT: 68 IN | WEIGHT: 227 LBS

## 2021-06-01 VITALS — BODY MASS INDEX: 34.53 KG/M2 | WEIGHT: 220 LBS | HEIGHT: 67 IN

## 2021-06-02 ENCOUNTER — RECORDS - HEALTHEAST (OUTPATIENT)
Dept: ADMINISTRATIVE | Facility: CLINIC | Age: 76
End: 2021-06-02

## 2021-06-09 NOTE — PROGRESS NOTES
F/U  --C/O left low back and left buttock pain, recurrent that flared up 2 weeks ago  --Rates back pain 6/10  --PT x 4 sessions [facility] 6/7/16 SI and low back pain    Medication  --Flexeril 5 mg 1 tab BID PRN  --Gabapentin 300 mg 1 tab QHS PRN  --Tramadol 50 mg1 tab 4 times daily (PCP)

## 2021-06-09 NOTE — PROGRESS NOTES
Assessment:   Diagnoses and all orders for this visit:    Chronic SI joint pain  -     OPS Joint Injection Sacroiliac Joint Unilateral; Future; Expected date: 4/6/17    Spondylolisthesis of lumbar region    Chronic left-sided low back pain with left-sided sciatica  -     OPS Joint Injection Sacroiliac Joint Unilateral; Future; Expected date: 4/6/17       Reggie Gomes is a 71 y.o. y.o. male with past medical history significant for dyslipidemia, coronary artery disease, prostate cancer, history Right total hip replacement Dr. Yap [facility] 2014, prior L3-4 lumbar fusion 2006 as well as lumbar decompression at L3-4 and L4-5 with Dr. Gonzalez 2 years ago who presents today for follow-up regarding return of his left low back pain that radiates to the left SI joint most consistent with sacroiliac joint dysfunction which is chronic for him.    Neurologically intact on exam.     Plan:     A shared decision making plan was used. The patient's values and choices were respected. Prior medical records from 12/2/2016 were reviewed today. The following represents what was discussed and decided upon by the provider and the patient.        -DIAGNOSTIC TESTS:   --Lumbar spine MRI does reveal L4-5 posterior fusion, L3-4 moderate left and severe right chronic foraminal stenosis, and advanced bilateral facet arthropathy.    -INTERVENTIONS: Ordered left SI joint steroid injection today.    -MEDICATIONS: Continue tramadol prescribed by PCP as well as naproxen as needed.  Discussed side effects of medications and proper use. Patient verbalized understanding.    -PHYSICAL THERAPY: Encouraged patient to continue physical therapy home exercise program from previous PT sessions 2016.  Discussed the importance of core strengthening, ROM, stretching exercises with the patient and how each of these entities is important in decreasing pain.  Explained to the patient that the purpose of physical therapy is to teach the  patient a home exercise program.  These exercises need to be performed every day in order to decrease pain and prevent future occurrences of pain.        -PATIENT EDUCATION:  25 minutes of total visit time was spent face to face with the patient today, 60 % of the visit was spent on counseling, education, and coordinating care.     -FOLLOW UP: Follow-up for injection with Dr. Macdonald.  Advised to contact clinic if symptoms worsen or change.    Subjective:     Reggie Gomes is a 71 y.o. male who presents today for follow-up regarding return of his left low back pain that radiates to left posterior buttock that is significant for the last 2 weeks at a 6/10 up to an 8/10 at its worst it is more bothersome with standing.  Patient denies any new symptoms reports that this is very similar to his chronic SI joint pain in which previously he got significant relief from SI joint injection.    *He did see Dr. Yap who previously completed his right total hip replacement 2014, recommended an SI joint injection. Dr. Salomon had mentioned that if he does not improve or develops groin pain, to further discussed left total hip replacement.      Treatment to Date: Prior L4-5 lumbar fusion in 2006 as well as L3-L4 and L4-L5 decompressive surgery with Dr. Gonzalez in 2012. Patient had bilateral L3-4 TF TATA on 9/3/2015.  Repeat bilateral L3-4 TF TATA 12/11/2015 with 3.5 months of relief.  She does do daily exercises and stretches from prior PT sessions.  Bilateral L3-4 TF TATA 3/20/2016 with 90% relief ×2 months.  Physical therapy ×4 sessions with good relief SI joint and low back June 2016.  SI joint belt with good relief.  Left L3-4 facet joint steroid injection 7/11/2016 with 80% relief ×3 months.  Left L3-4 facet joint steroid injection 10/6/2016 with some immediate relief for a couple hours however no lasting relief on this injection.  Left SI Joint Steroid Injection with 99% relief x 3 months    Patient Active Problem List    Diagnosis     Dyslipidemia     Coronary Artery Disease     Prostate cancer     Hydrocele, right     Ventral incisional hernia       Current Outpatient Prescriptions on File Prior to Encounter   Medication Sig Dispense Refill     aspirin 81 mg chewable tablet Chew 81 mg daily.       atorvastatin (LIPITOR) 40 MG tablet TAKE 1 TABLET(40 MG) BY MOUTH EVERY MORNING 90 tablet 2     CALCIUM CARBONATE (CALCIUM 500 ORAL) Take 1 tablet by mouth daily.       cyclobenzaprine (FLEXERIL) 5 MG tablet Take 5 mg by mouth 3 (three) times a day as needed for muscle spasms.       cyclobenzaprine (FLEXERIL) 5 MG tablet TAKE 1 TABLET BY MOUTH TWICE DAILY AS NEEDED FOR MUSCLE SPASMS 42 tablet 1     gabapentin (NEURONTIN) 300 MG capsule Take 300 mg by mouth bedtime as needed.       HYDROcodone-acetaminophen (XODOL) 5-300 mg per tablet Take 1-2 tablets by mouth every 6 (six) hours as needed for pain. 30 tablet 0     lisinopril (PRINIVIL,ZESTRIL) 5 MG tablet TAKE 1 TABLET(5 MG) BY MOUTH DAILY 90 tablet 2     LOPERAMIDE HCL (IMODIUM A-D ORAL) Take 1 capsule by mouth daily.       naproxen sodium (ALEVE) 220 mg cap Take 1 tablet by mouth every 6 (six) hours as needed.       omeprazole (PRILOSEC) 20 MG capsule Take 20 mg by mouth daily.       psyllium (METAMUCIL) 0.52 gram capsule Take 0.52 g by mouth daily. 3-4 capsules per day       tamsulosin (FLOMAX) 0.4 mg Cp24 TAKE 1 CAPSULE BY MOUTH TWICE DAILY 180 capsule 4     traMADol (ULTRAM) 50 mg tablet Take 50 mg by mouth 4 (four) times a day.       No current facility-administered medications on file prior to encounter.        No Known Allergies    Past Medical History:   Diagnosis Date     Arthritis     osteo     BPH (benign prostatic hyperplasia)      Cancer     prostate and bladder     Coronary artery disease      GERD (gastroesophageal reflux disease)      Hydrocele      Hyperlipidemia      Lumbar spinal stenosis      Myocardial infarction 2004     Prostate cancer         Review of  Systems  ROS: Specifically negative for bowel/bladder dysfunction, balance changes, headache, dizziness, foot drop, fevers, chills, appetite changes, nausea/vomiting, unexplained weight loss. Otherwise 13 systems reviewed are negative. Please see the patient's intake questionnaire from today for details.    Reviewed Social, Family, Past Medical and Past Surgical history with patient, no significant changes noted since prior visit.     Objective:   /69 (Patient Site: Left Arm, Patient Position: Sitting)  Pulse 86  Wt (!) 230 lb (104.3 kg)  SpO2 94%  BMI 35.26 kg/m2    PHYSICAL EXAMINATION:    --CONSTITUTIONAL: Well developed, well nourished, healthy appearing individual.  --PSYCHIATRIC: Appropriate mood and affect. No difficulty interacting due to temper, social withdrawal, or memory issues.  --SKIN: Lumbar region is dry and intact. Sensation to light touch is intact in the bilateral L4, L5, and S1 dermatomes.  --RESPIRATORY: Normal rhythm and effort. No abnormal accessory muscle breathing patterns noted.   --MUSCULOSKELETAL:  Normal lumbar lordosis noted, no lateral shift.  --GROSS MOTOR: Easily arises from a seated position.   --LUMBAR SPINE:  Inspection reveals no evidence of deformity. Range of motion is not limited in lumbar flexion, extension, or lateral rotation. No tenderness to palpation. Straight leg raising in the supine position is negative to radicular pain. Sciatic notch non-tender on the right, significantly tender in the left.  --SACROILIAC JOINT: Positive left distraction.  Positive left Leela's with reproduction of pain to affected extremity. One Finger point test positive left.   --LOWER EXTREMITY MOTOR TESTING:  Plantar flexion left 5/5, right 5/5   Dorsiflexion left 5/5, right 5/5   Great toe MTP extension left 5/5, right 5/5  Knee flexion left 5/5, right 5/5  Knee extension left 5/5, right 5/5   Hip flexion left 5/5, right 5/5  Hip abduction left 5/5, right 5/5  Hip adduction left 5/5,  right 5/5   --HIPS: Full range of motion bilaterally. Negative FABERs on the involved lower extremity.   --NEUROLOGIC: Bilateral patellar and achilles reflexes are physiologic and symmetric. Lower extremities are intact to light touch.     RESULTS:   Imaging: MRI of the lumbar spine was reviewed today. The images were shown to the patient and the findings were explained using a spine model.    Xr Hip Left 2 Or More Vws  Result Date: 9/28/2016  INDICATION: Hip pain. COMPARISON: None.   FINDINGS: Moderately severe DJD involving left hip with near complete loss of superior joint space and circumferential osteophytes. Alignment normal, no fractures. Prior right hip arthroplasty in the low lumbar spine fusion.       Xr Lumbar Spine Flex And Ext 2 Or 3 Vws  9/2/2015 XR LUMBAR SPINE FLEX AND EXT 2 OR 3 VWS 9/2/2015 7:32 AM INDICATION: Lumbar pain. COMPARISON: None.   FINDINGS: Posterior lumbar fusion at L4-L5. No instability with flexion or extension.      Lumbar MRI from 8/5/2015 from [facility].   Conclusion: Multilevel neural lumbar degenerative changes with hyperlordosis, L3-L5 dorsal decompression, L4-5 posterior instrumented/dorsal lateral fusion, and the following specific findings:  1. L3-4 moderate to severe right and moderate left chronic foraminal stenosis with right greater than left L3 ganglionic impingement, in the setting of 3 mm retrolisthesis and advanced bilateral facet degeneration with inflammatory arthropathy on the right. No residual central or subarticular stenosis.   2. L5-S1 medial displacement of the left greater than right descending S1 nerve roots without ana impingement. Moderate right/mild to moderate left foraminal stenosis with right L5 encroachment. Moderate bilateral facet degeneration.    3. L2-L3 moderate bilateral facet degeneration with inflammatory arthropathy on the right.    4. L4-5 mild right foraminal stenosis without residual central or subarticular stenosis.    5.  Fusion status at L4-5 is intermediate by MRI. Within these constraints, no spondylolysis, no acute fracture, or no destructive osseous lesion.    6. 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

## 2021-06-10 NOTE — PROGRESS NOTES
Assessment:   Reggie Gomes is a 71 y.o. y.o. male with past medical history significant for dyslipidemia, coronary artery disease, prostate cancer, hyperlipidemia who presents today for follow-up regarding low back pain with radiation to the left lower extremity with associated numbness and tingling.  The patient is status post a left sacroiliac joint injection on April 10, 2017 which provided 60% relief of his pain but only lasted 2 days.  The patient had previously had this same injection on October 20, 2016.  At that time, the SI joint injection provided relief of his pain for 5 months.  I am concerned that the patient may be more symptomatic from facet arthropathy at L5-S1 or potentially left L5 nerve root compression.  The patient had an MRI of his lumbar spine from 2015 which showed moderate facet arthropathy at L5-S1 and mild to moderate left foraminal stenosis.  Additionally, the patient had a significant fall from standing height in February 2017 in which she landed on the left side of his lower back.  He has not had any imaging of his spine since his fall.        Plan:     A shared decision making plan was used.  The patient's values and choices were respected.  The following represents what was discussed and decided upon by the physician assistant and the patient.      1.  DIAGNOSTIC TESTS: I reviewed the MRI lumbar spine from 2015.  I did place an order for an updated MRI lumbar spine for further evaluation of his pain.    2.  PHYSICAL THERAPY: No further physical therapy was ordered.  The patient completed physical therapy 1 year ago.    3.  MEDICATIONS: Hydrocodone/acetaminophen 5/325 mg 1 tab 3 times daily as needed, #30 with no refills was prescribed.  The patient uses tramadol as needed for chronic pain.  This is prescribed by his primary care provider.  His most recent refill of tramadol was on April 14, 2017 for 120 tabs.  He has not had any additional opioid prescription since then.  The  "patient can continue using gabapentin 300 mg at bedtime and Aleve as needed.  He also cyclobenzaprine which she can use as needed.    4.  INTERVENTIONS: No further interventions were ordered.  We will await the results of his MRI lumbar spine.  I would favor trying a left L5-S1 facet joint injection versus a left L5-S1 transforaminal epidural steroid injection, depending on those results based on the location of his pain today.  -We will have to be mindful of the patient's total steroid dosage.  He has had 120 mg of Depo-Medrol since July 2016.    5.  PATIENT EDUCATION: The patient is in agreement with the above plan.  All questions were answered.    6.  FOLLOW-UP: A nurse will call patient with the results of the MRI lumbar spine.  If he has any questions or concerns, he should not hesitate to call.    Subjective:     Reggie Gomes is a 71 y.o. male who presents today for follow-up regarding low back pain with radiation into the left lower extremity.  This is a patient of Lena Bhatti CNP.  The patient status post a left sacroiliac joint injection on April 10, 2017.  The patient reports that this injection provided 60% relief of his pain but only lasted about 2 days.  He had previously had the same injection on October 20, 2016 and it provided significant relief of his pain for 5 months.  The patient is quite disappointed that the repeat injection did not provide similar results.  Of note, the patient states that in February 2017 he had a significant slip and fall from standing height.  The patient states that he landed on the left side of his low back \"like a slab of parks\".  He injured his left rotator cuff at the time.  He has not had any imaging of his spine since the fall.  The patient states that his low back pain has been getting progressively worse since his injection and is now radiating down the leg.  The patient states that this morning when he woke up his pain was as severe as it has ever been, " even prior to his back surgeries.    The patient was a left-sided low back pain.  The pain radiates into the left buttock and down the left lateral thigh to just distal to the lateral knee.  He has intermittent numbness and tingling in the same distribution as his pain.  He rates his pain today as an 8 out of 10.  At its best it is a 6 out of 10.  At its worst it is a 9 out of 10.  The patient's pain is aggravated with walking and transitioning from a seated to a standing position.  His pain is alleviated with sitting.  The patient states that when he went to [employer] this morning he was walking and he felt significant fatigue in his low back and left leg.  He feels that the left leg is weak.  He denies any groin pain.  He denies any loss of bowel or bladder control.  He denies any recent fevers, chills, or sweats.  He denies any right-sided symptoms.    The patient completed physical therapy for his low back last year.  He tried chiropractic treatment about 2 months ago.  The patient is using tramadol 50 mg 4 times daily.  This is prescribed by his primary care provider.  He is requesting a stronger pain medication.  Uses gabapentin 300 mg at bedtime.  He uses cyclobenzaprine as needed.  He uses Aleve 2 tabs twice daily.    Past medical history is reviewed and is unchanged in the interim.    Family history is reviewed and is unchanged in the interim.    Review of Systems:  Positive for numbness/tingling, weakness.  Negative for loss of bowel/bladder control, footdrop, headache, dizziness, nausea/vomiting, blurry vision, balance changes.     Objective:   CONSTITUTIONAL:  Vital signs as above.  No acute distress.  The patient is well nourished and well groomed.    PSYCHIATRIC:  The patient is awake, alert, oriented to person, place and time.  The patient is answering questions appropriately with clear speech.  Normal affect.  HEENT: Normocephalic, atraumatic.  Sclera clear.    SKIN:  Skin over the face, posterior  torso, bilateral upper and lower extremities is clean, dry, intact without rashes.  MUSCULOSKELETAL:  Gait is severely antalgic, favoring the left.  Significant tenderness over the left lower lumbar paraspinal muscles.    No significant tenderness palpation of the left sacroiliac joint.  The patient has 5/5 strength for the bilateral hip flexors, knee flexors/extensors, ankle dorsiflexors/plantar flexors, ankle evertors/invertors.  Straight leg raise is negative bilaterally.  Milan's test on the left was mildly positive to reproduce left-sided pain.  Milan's test is negative on the right.  NEUROLOGICAL: 1+ patellar, trace Achilles reflexes which are symmetric bilaterally.  No ankle clonus bilaterally.  Sensation to light touch is intact in the bilateral L4, L5, and S1 dermatomes.       RESULTS: MRI lumbar spine from [facility] dated August 7, 2015 is reviewed.  This shows postoperative changes of an L3 through L5 dorsal decompression, L4-5 posterior instrumented/dorsal lateral fusion.  At L3-4 there is moderate to severe right and moderate left foraminal stenosis with right greater than left L3 ganglionic impingement and advanced bilateral facet degeneration with an inflammatory arthropathy on the right.  There is no residual central or lateral recess stenosis at this level.  At L5-S1 there is medial displacement of the left greater than right descending S1 nerve roots without ana impingement.  There is moderate right and mild to moderate left foraminal stenosis with right L5 encroachment.  There is moderate bilateral facet degeneration.  At L2-3 there is moderate bilateral facet degeneration with inflammatory arthropathy on the right.  At L4-5 there is mild right foraminal stenosis without residual central canal or lateral recess stenosis.  The fusion status at L4-5 is indeterminate by MRI.  There is transitional lumbosacral anatomy with partial lumbarlization of S1.

## 2021-06-11 NOTE — PROGRESS NOTES
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

## 2021-06-11 NOTE — PROGRESS NOTES
[facility] Daily Progress     Patient Name: Reggie Gomes  Date: 7/10/2017  Visit #: 3  PTA visit #:  na  Referral Diagnosis:   Chronic SI joint pain [M53.3, G89.29]  - Primary       S/P lumbar fusion [Z98.1]       Lumbar facet arthropathy [M12.88]       Chronic left-sided low back pain with left-sided sciatica [M54.42, G89.29]        Referring provider: Lena Bhatti C*  Visit Diagnosis:     ICD-10-CM    1. Chronic left-sided low back pain with left-sided sciatica M54.42     G89.29    2. Generalized muscle weakness M62.81    3. Decreased ROM of lumbar spine M25.60          Assessment:     HEP/POC compliance is  fair .  Response to Intervention Pt reporting increased pain, however, has not been completing HEP correctly and added prone extensions from the internet. Reviewed and re-educated on correct HEP and to increase nerve glides and stretches to 5 times a day.  Patient is appropriate to continue with skilled physical therapy intervention, as indicated by initial plan of care.    Goal Status: on-going  Pt. will be independent with home exercise program in : 4 weeks (to self-manage pain and improve function)  Pt. will be able to walk : 30 minutes;in 12 weeks;for community mobility;for exercise/recreation;with less pain (with less than 3/10 pain)  Patient will ascend / descend: stairs;with recipricol gait;with less pain;in 12 weeks (less than 3/10 pain)  No Data Recorded    Plan / Patient Education:     Continue with initial plan of care.  Progress with home program as tolerated. progress hip strengthening; manual nerve glides and MT as needed, seated nerve glides    Subjective:     Pain Ratin-5/10  Pt has had a lot of pain this last week. Got out of the car on Tues/wednesday with immediate increase in pain. He joined the [employer] and tried the NuStep. He was able to complete it for about 5 min.  Has tried prone press ups from the internet.      Objective:     Lumbar AROM:  Flexion: to  ankles  Ext: mod  Rotation: R mod with pain on L; L mod  Lateral flexion: R mild, L mild    Mod cueing with HEP and educated to stop VESTA and prone ext stretch that he learned from the internet    Treatment Today     TREATMENT MINUTES COMMENTS   Evaluation     Self-care/ Home management     Manual therapy     Neuromuscular Re-education     Therapeutic Activity     Therapeutic Exercises 25 -see exercise flow sheet  -NuStep x6 min, WL 5.0  -verbal review of HEP   Gait training     Modality__________________                Total 25    Blank areas are intentional and mean the treatment did not include these items.       Kristal Vega, PT, DPT  7/10/2017

## 2021-06-11 NOTE — PROGRESS NOTES
Assessment:     Diagnoses and all orders for this visit:    Chronic SI joint pain  -     Ambulatory referral to PT/OT    S/P lumbar fusion  -     Ambulatory referral to PT/OT    Lumbar facet arthropathy  -     Ambulatory referral to PT/OT    Chronic left-sided low back pain with left-sided sciatica  -     Ambulatory referral to PT/OT       Reggie Gomes is a 72 y.o. y.o. male with past medical history significant for dyslipidemia, coronary artery disease, prostate cancer, history Right total hip replacement Dr. Yap [facility] orthopedics 2014, prior L3-4 lumbar fusion 2006 as well as lumbar decompression at L3-4 and L4-5 with Dr. Gonzalez 2 years ago who presents today for follow-up regarding ongoing left posterior buttock that is most bothersome with some pain into his left anterior thigh that is chronic in nature.    Minimal lasting relief with L5-S1 TF TATA, reports that the SI joint steroid injection has given him the most relief in the past.    Patient is neurologically intact on exam.  No lasting relief with L5-S1 TF TATA, no lasting relief with L3-4 facet joint injection above his fusion.  He is gotten the best relief with SI joint steroid injection in the past.  Pain is consistent with SI joint dysfunction.     Plan:     A shared decision making plan was used. The patient's values and choices were respected. Prior medical records from 4/6/17 were reviewed today. The following represents what was discussed and decided upon by the provider and the patient.        -DIAGNOSTIC TESTS: Images were personally reviewed and interpreted.   --Patient did not have completed MRI that was recommended by Nelly Avery May 2017, if no further relief with physical therapy and repeat SI joint steroid injection if needed, would recommend updated lumbar spine MRI.  He is hopeful to avoid updated MRI because he does not want further surgery.  --Lumbar spine MRI does reveal L4-5 posterior fusion, L3-4 moderate left and  severe right chronic foraminal stenosis, and advanced bilateral facet arthropathy.    -INTERVENTIONS: Patient was hopeful for repeat SI joint steroid injection: Would recommend holding off on injections at this time due to more than 5 steroid injection since July 2016.    -MEDICATIONS: Continue tramadol as needed prescribed by PCP.  Discussed side effects of medications and proper use. Patient verbalized understanding.    -PHYSICAL THERAPY: Referral to physical therapy [facility] sent for SI joint dysfunction establish home exercise program and trial SI joint manipulation.  Discussed the importance of core strengthening, ROM, stretching exercises with the patient and how each of these entities is important in decreasing pain.  Explained to the patient that the purpose of physical therapy is to teach the patient a home exercise program.  These exercises need to be performed every day in order to decrease pain and prevent future occurrences of pain.        -PATIENT EDUCATION:  25 minutes of total visit time was spent face to face with the patient today, 60 % of the visit was spent on counseling, education, and coordinating care.     -FOLLOW UP: Follow-up in 4 weeks.  He is leaving for a trip to [place] middle of August 2017 and was hoping to get an steroid injection prior to his trip to help him to walk, discussed that we would discuss further at follow-up, this would be put him at higher risk for developing osteoporosis and increased fracture risk in the future, he is aware of the risk.  Advised to contact clinic if symptoms worsen or change.    Subjective:     Reggie Gomes is a 72 y.o. male who presents today for follow-up regarding ongoing left chronic posterior buttock pain that is his biggest concern today currently an 8/10, a [place] its best more bothersome with sitting and walking for long periods of time.  He does have some pain into the lateral knee however that is tolerable at this time, does  report numbness and tingling in to his left leg that is chronic in nature.  Denies any new symptoms, denies recent trips or falls however does report perceived weakness in his left lower extremity that is ongoing.  Denies bowel or bladder dysfunction.    She reports that he got really minimal lasting relief from L5-S1 transfemoral epidural steroid injection, reports that the SI joint steroid injections has given him the most benefit in the past.    *He did see Dr. Yap who previously completed his right total hip replacement 2014, recommended an SI joint injection. Dr. Salomon had mentioned that if he does not improve or develops groin pain, to further discussed left total hip replacement.      Treatment to Date: Prior L4-5 lumbar fusion in 2006 as well as L3-L4 and L4-L5 decompressive surgery with Dr. Gonzalez in 2012.   Physical therapy ×4 sessions with good relief SI joint and low back June 2016.  SI joint belt with good relief.    Bilateral L3-4 TF TATA 12/11/2015 with 3.5 months of relief.  Bilateral L3-4 TF TATA 3/20/2016 with 90% relief ×2 months.  Left L3-4 facet joint steroid injection 7/11/2016 with 80% relief ×3 months.  Left L3-4 facet joint steroid injection 10/6/2016 with some immediate relief for a couple hours however no lasting relief on this injection.    Left SI Joint Steroid Injection 10/20/2016 with 99% relief x 3 months, preprocedure pain 4/10, postprocedure 0/10.  Left SI joint steroid injection 4/10/2017, preprocedure pain 6/10, postprocedure 1/10.  Left L5-S1 TF TATA 5/9/2017 with 50% relief x 1 week, however patient reports did physical work and aggrivated, preprocedure pain 6/10, postprocedure 0/10.    Patient Active Problem List   Diagnosis     Dyslipidemia     Coronary Artery Disease     Prostate cancer     Hydrocele, right     Ventral incisional hernia       Current Outpatient Prescriptions on File Prior to Encounter   Medication Sig Dispense Refill     atorvastatin (LIPITOR) 40 MG  tablet TAKE 1 TABLET(40 MG) BY MOUTH EVERY MORNING 90 tablet 2     CALCIUM CARBONATE (CALCIUM 500 ORAL) Take 1 tablet by mouth daily.       cyclobenzaprine (FLEXERIL) 5 MG tablet Take 5 mg by mouth 3 (three) times a day as needed for muscle spasms.       cyclobenzaprine (FLEXERIL) 5 MG tablet TAKE 1 TABLET BY MOUTH TWICE DAILY AS NEEDED FOR MUSCLE SPASMS 42 tablet 1     gabapentin (NEURONTIN) 300 MG capsule Take 300 mg by mouth bedtime as needed.       HYDROcodone-acetaminophen 5-325 mg per tablet Take 1 tablet by mouth every 8 (eight) hours as needed for pain. 30 tablet 0     lisinopril (PRINIVIL,ZESTRIL) 5 MG tablet TAKE 1 TABLET(5 MG) BY MOUTH DAILY 90 tablet 2     LOPERAMIDE HCL (IMODIUM A-D ORAL) Take 1 capsule by mouth daily.       naproxen sodium (ALEVE) 220 mg cap Take 1 tablet by mouth every 6 (six) hours as needed.       omeprazole (PRILOSEC) 20 MG capsule Take 20 mg by mouth daily.       psyllium (METAMUCIL) 0.52 gram capsule Take 0.52 g by mouth daily. 3-4 capsules per day       tamsulosin (FLOMAX) 0.4 mg Cp24 TAKE 1 CAPSULE BY MOUTH TWICE DAILY 180 capsule 4     traMADol (ULTRAM) 50 mg tablet Take 50 mg by mouth 4 (four) times a day.       [DISCONTINUED] aspirin 81 mg chewable tablet Chew 81 mg daily.       No current facility-administered medications on file prior to encounter.        No Known Allergies    Past Medical History:   Diagnosis Date     Arthritis     osteo     BPH (benign prostatic hyperplasia)      Cancer     prostate and bladder     Coronary artery disease      GERD (gastroesophageal reflux disease)      Hydrocele      Hyperlipidemia      Lumbar spinal stenosis      Myocardial infarction 2004     Prostate cancer         Review of Systems  ROS: Positive for numbness and tingling, weakness, balance changes.  Specifically negative for bowel/bladder dysfunction, headache, dizziness, foot drop, fevers, chills, appetite changes, nausea/vomiting, unexplained weight loss. Otherwise 13 systems  reviewed are negative. Please see the patient's intake questionnaire from today for details.    Reviewed Social, Family, Past Medical and Past Surgical history with patient, no significant changes noted since prior visit.     Objective:     /75 (Patient Site: Left Arm, Patient Position: Sitting)  Pulse 78  Temp 98.6  F (37  C) (Oral)   Wt (!) 229 lb (103.9 kg)  SpO2 95%  BMI 35.87 kg/m2    PHYSICAL EXAMINATION:    --CONSTITUTIONAL: Well developed, well nourished, healthy appearing individual.  --PSYCHIATRIC: Appropriate mood and affect. No difficulty interacting due to temper, social withdrawal, or memory issues.  --SKIN: Lumbar region is dry and intact. Sensation to light touch is intact in the bilateral L4, L5, and S1 dermatomes.  --RESPIRATORY: Normal rhythm and effort. No abnormal accessory muscle breathing patterns noted.   --MUSCULOSKELETAL:  Normal lumbar lordosis noted, no lateral shift.  --GROSS MOTOR: Easily arises from a seated position.   --LUMBAR SPINE:  Inspection reveals no evidence of deformity. Range of motion is not limited in lumbar flexion, extension, or lateral rotation. No tenderness to palpation. Straight leg raising in the supine position is negative to radicular pain. Sciatic notch non-tender.   --SACROILIAC JOINT: Positive LEFT Leela's with reproduction of pain to affected extremity. One Finger point test Positive LEFT.  --LOWER EXTREMITY MOTOR TESTING:  Plantar flexion left 5/5, right 5/5   Dorsiflexion left 5/5, right 5/5   Great toe MTP extension left 5/5, right 5/5  Knee flexion left 5/5, right 5/5  Knee extension left 5/5, right 5/5   Hip flexion left 5/5, right 5/5  Hip abduction left 5/5, right 5/5  Hip adduction left 5/5, right 5/5   --HIPS: Full range of motion bilaterally. Negative FABERs on the involved lower extremity.   --NEUROLOGIC: Bilateral patellar and achilles reflexes are physiologic and symmetric. Lower extremities are intact to light touch.     RESULTS:    Imaging: MRI of the lumbar spine was reviewed today. The images were shown to the patient and the findings were explained using a spine model.      Xr Hip Left 2 Or More Vws  Result Date: 9/28/2016  INDICATION: Hip pain. COMPARISON: None.   FINDINGS: Moderately severe DJD involving left hip with near complete loss of superior joint space and circumferential osteophytes. Alignment normal, no fractures. Prior right hip arthroplasty in the low lumbar spine fusion.       Xr Lumbar Spine Flex And Ext 2 Or 3 Vws  9/2/2015 XR LUMBAR SPINE FLEX AND EXT 2 OR 3 VWS 9/2/2015 7:32 AM INDICATION: Lumbar pain. COMPARISON: None.   FINDINGS: Posterior lumbar fusion at L4-L5. No instability with flexion or extension.        Lumbar MRI from 8/5/2015 from [facility].   Conclusion: Multilevel neural lumbar degenerative changes with hyperlordosis, L3-L5 dorsal decompression, L4-5 posterior instrumented/dorsal lateral fusion, and the following specific findings:  1. L3-4 moderate to severe right and moderate left chronic foraminal stenosis with right greater than left L3 ganglionic impingement, in the setting of 3 mm retrolisthesis and advanced bilateral facet degeneration with inflammatory arthropathy on the right. No residual central or subarticular stenosis.   2. L5-S1 medial displacement of the left greater than right descending S1 nerve roots without ana impingement. Moderate right/mild to moderate left foraminal stenosis with right L5 encroachment. Moderate bilateral facet degeneration.    3. L2-L3 moderate bilateral facet degeneration with inflammatory arthropathy on the right.   4. L4-5 mild right foraminal stenosis without residual central or subarticular stenosis.    5. Fusion status at L4-5 is intermediate by MRI. Within these constraints, no spondylolysis, no acute fracture, or no destructive osseous lesion.    6. 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

## 2021-06-11 NOTE — PROGRESS NOTES
"[facility] Daily Progress     Patient Name: Reggie Gomes  Date: 7/3/2017  Visit #: 2  PTA visit #:  na  Referral Diagnosis:   Chronic SI joint pain [M53.3, G89.29]  - Primary       S/P lumbar fusion [Z98.1]       Lumbar facet arthropathy [M12.88]       Chronic left-sided low back pain with left-sided sciatica [M54.42, G89.29]        Referring provider: Lena Bhatti C*  Visit Diagnosis:     ICD-10-CM    1. Chronic left-sided low back pain with left-sided sciatica M54.42     G89.29    2. Generalized muscle weakness M62.81    3. Decreased ROM of lumbar spine M25.60          Assessment:     HEP/POC compliance is  good .  Response to Intervention Pt reporting tenderness with MT but overall, improvements at end of session.  Patient is appropriate to continue with skilled physical therapy intervention, as indicated by initial plan of care.    Goal Status:  Pt. will be independent with home exercise program in : 4 weeks (to self-manage pain and improve function)  Pt. will be able to walk : 30 minutes;in 12 weeks;for community mobility;for exercise/recreation;with less pain (with less than 3/10 pain)  Patient will ascend / descend: stairs;with recipricol gait;with less pain;in 12 weeks (less than 3/10 pain)  No Data Recorded    Plan / Patient Education:     Continue with initial plan of care.  Progress with home program as tolerated. progress hip strengthening; manual nerve glides and MT    Subjective:     Pain Ratin  Pt still has a lot of pain with transitions- in/out of the car. The exercises are going well. 5/10 pain with sitting and transitions.       Objective:     Lumbar AROM:  Flexion: to ankles  Ext: mod  Rotation: R mod L mod    Pt reports feeling \"a lot better\" than when he came in at end    Treatment Today     TREATMENT MINUTES COMMENTS   Evaluation     Self-care/ Home management     Manual therapy 10 -R S/L L lumbar rotational mobilizations; grade III-IV with tenderness noted but " "\"feels good\"   Neuromuscular Re-education     Therapeutic Activity     Therapeutic Exercises 15 -see exercise flow sheet  -NuStep x6 min, WL 5.0  -verbal review of HEP   Gait training     Modality__________________                Total 25    Blank areas are intentional and mean the treatment did not include these items.       Kristal Vega, PT, DPT  7/3/2017    "

## 2021-06-12 NOTE — PROGRESS NOTES
"[facility] Daily Progress     Patient Name: Reggie Martin  Date: 2017  Visit #: 4  PTA visit #:  na  Referral Diagnosis:   Chronic SI joint pain [M53.3, G89.29]  - Primary       S/P lumbar fusion [Z98.1]       Lumbar facet arthropathy [M12.88]       Chronic left-sided low back pain with left-sided sciatica [M54.42, G89.29]        Referring provider: Lena Bhatti C*  Visit Diagnosis:     ICD-10-CM    1. Chronic left-sided low back pain with left-sided sciatica M54.42     G89.29    2. Generalized muscle weakness M62.81    3. Decreased ROM of lumbar spine M25.60          Assessment:     HEP/POC compliance is  good .  Response to Intervention Improved HEP compliance but still missing 1-2 exercises from his routine daily. Able to progress abdominal and hip strengthening today. Pt reporting less pain overall and will car transfers.  Patient is benefitting from skilled physical therapy and is making steady progress toward functional goals.  Patient is appropriate to continue with skilled physical therapy intervention, as indicated by initial plan of care.    Goal Status: on-going  Pt. will be independent with home exercise program in : 4 weeks;Met (to self-manage pain and improve function)  Pt. will be able to walk : 30 minutes;in 12 weeks;for community mobility;for exercise/recreation;with less pain;Progressing toward (with less than 3/10 pain)  Patient will ascend / descend: stairs;with recipricol gait;with less pain;in 12 weeks;Progressing toward (less than 3/10 pain)  No Data Recorded    Plan / Patient Education:     Continue with initial plan of care.  Progress with home program as tolerated. progress hip strengthening    Subjective:     Pain Ratin-3/10  Pt has some \"good days and bad days\". Overall, more good days now. Has been able to drive back from [place] yesterday. Has been able to do more activities now.   In/out of car has improved with techniques learned last " time.    Objective:     Lumbar AROM:  Flexion: to ankles  Ext: mild with soreness  Rotation: R mild with intermittent pain on L; L mild  Lateral flexion: R mild, L mild      Treatment Today     TREATMENT MINUTES COMMENTS   Evaluation     Self-care/ Home management     Manual therapy     Neuromuscular Re-education     Therapeutic Activity     Therapeutic Exercises 25 -see exercise flow sheet  -césar heel raises x30 reps   Gait training     Modality__________________                Total 25    Blank areas are intentional and mean the treatment did not include these items.       Kristal Vega, PT, DPT  7/24/2017     [facility] Discharge Summary    Patient was seen for 4 visits from 6/26/17  to 7/24/17 with 0 missed appointments (1 cancelled appt)  The patient attended therapy initially, but did not finish the therapy sessions prescribed.  Goals were not fully achieved. Explanation for goals not achieved: Unable to formally assess progress towards goals as pt did not return to PT.  The patient discontinued therapy, did not return.    Therapy will be discontinued at this time.  The patient will need a new referral to resume.    Thank you for your referral.  Kristal Vega  9/22/2017  11:10 AM

## 2021-06-12 NOTE — PROGRESS NOTES
F/U  --C/O ongoing left low back pain, no change  --Numbness down the left anterior/lateral thigh to the shin x couple weeks, worse lower leg per pt   --Rates pain 6/10  --PT x 4 sessions [facility], 7/24/17 for back    Medication  --Flexeril 5 mg 1 tab in the evening PRN  --Gabapentin 300 mg 1 tab QHS PRN  --Tramadol 50 mg1 tab 4 times daily (PCP)  --Aleve 220 mg 1 tab Q6H PRN  --Vicodin 5-325 mg and Percocet, has not been on this lately but has it for severe pain only

## 2021-06-12 NOTE — PROGRESS NOTES
Assessment:     Diagnoses and all orders for this visit:    Chronic SI joint pain  -     OPS Joint Injection Sacroiliac Joint Unilateral; Future; Expected date: 8/3/17    S/P lumbar fusion    Chronic left-sided low back pain with left-sided sciatica  -     OPS Joint Injection Sacroiliac Joint Unilateral; Future; Expected date: 8/3/17     Reggie Gomes is a 72 y.o. y.o. male with past medical history significant for dyslipidemia, coronary artery disease, prostate cancer, history Right total hip replacement Dr. Yap [facility] orthopedics 2014, prior L3-4 lumbar fusion 2006 as well as lumbar decompression at L3-4 and L4-5 with Dr. Gonzalez 2 years ago who presents today for follow-up regarding ongoing left posterior buttock pain that is his most concern today with some numbness and tingling in his left lower extremity and L5 radicular type pain.  He got minimal relief last with L5-S1 TF TATA, reports that the SI joint steroid injection previously gave him the most relief.     Patient is neurologically intact on exam does have significant increased pain with Fabere's on the left today.  He does have slightly elevated left iliac crest, would likely benefit from SI joint manipulation with chiropractic care versus a right heel lift if chiropractic care does not improve his hip height discrepancy..  He is going to discuss this further with his PCP.     Plan:     A shared decision making plan was used. The patient's values and choices were respected. Prior medical records from 6/26/17 were reviewed today. The following represents what was discussed and decided upon by the provider and the patient.        -DIAGNOSTIC TESTS: Images were personally reviewed and interpreted.   --Lumbar spine MRI does reveal L4-5 posterior fusion, L3-4 moderate left and severe right chronic foraminal stenosis, and advanced bilateral facet arthropathy.    -INTERVENTIONS: Ordered left SI joint steroid injection.  Patient is leaving for  [place] next week therefore would like to get the injection before he goes.  Going forward did discuss with patient that I would recommend injections no sooner than every 3 months as he does get these chronically.    -MEDICATIONS: Continue hydrocodone and tramadol prescribed by PCP as needed for pain.  Discussed side effects of medications and proper use. Patient verbalized understanding.    -PHYSICAL THERAPY: Encouraged patient to get back into physical therapy which he did recently see, can also see his doctor for SI manipulation however caution due to lumbar fusion.  He has a good relationship with his chiropractor.  Discussed the importance of core strengthening, ROM, stretching exercises with the patient and how each of these entities is important in decreasing pain.  Explained to the patient that the purpose of physical therapy is to teach the patient a home exercise program.  These exercises need to be performed every day in order to decrease pain and prevent future occurrences of pain.        -PATIENT EDUCATION:  20 minutes of total visit time was spent face to face with the patient today, 60 % of the visit was spent on counseling, education, and coordinating care.   -5 minutes spent outside of visit time, non-face-to-face time, reviewing chart.    -FOLLOW UP: Follow-up for injection with Dr. Macdonald than 2 weeks postinjection.  Advised to contact clinic if symptoms worsen or change.    Subjective:     Reggie Gomes is a 72 y.o. male who presents today for follow-up regarding left low back pain that radiates to the left posterior buttock that is chronic in nature currently a 6/10 up to an 8/10 at its worst, reports that it is more bothersome now for the last 3 weeks and significant.  Lifting and turning of any sorts is really bothersome for him.  He reports that the last injection L5-S1 TF TATA he got minimal relief and is gotten most relief in the past with the left SI joint steroid injection.  Reports  that his pain is typical to his chronic pain, he does have return of numbness and tingling into his left lower extremity lateral thigh and lateral shin.  The left posterior buttock pain again is most severe and debilitating.  It is leaving for an [place] cruise/train vacation and is hoping for injection before he leaves next Saturday.    *He did see Dr. Yap who previously completed his right total hip replacement 2014, recommended an SI joint injection. Dr. Salomon had mentioned that if he does not improve or develops groin pain, to further discussed left total hip replacement.      Treatment to Date: Prior L4-5 lumbar fusion in 2006 as well as L3-L4 and L4-L5 decompressive surgery with Dr. Gonzalez in 2012.   Physical therapy ×4 sessions with good relief SI joint and low back June 2016.  SI joint belt with good relief.     Bilateral L3-4 TF TATA 12/11/2015 with 3.5 months of relief.  Bilateral L3-4 TF TATA 3/20/2016 with 90% relief ×2 months.  Left L3-4 facet joint steroid injection 7/11/2016 with 80% relief ×3 months.  Left L3-4 facet joint steroid injection 10/6/2016 with some immediate relief for a couple hours however no lasting relief on this injection.     Left SI Joint Steroid Injection 10/20/2016 with 99% relief x 3 months, preprocedure pain 4/10, postprocedure 0/10.  Left SI joint steroid injection 4/10/2017, preprocedure pain 6/10, postprocedure 1/10.  Left L5-S1 TF TATA 5/9/2017 with 50% relief x 1 week, however patient reports did physical work and aggrivated, preprocedure pain 6/10, postprocedure 0/10.    Patient Active Problem List   Diagnosis     Dyslipidemia     Coronary Artery Disease     Prostate cancer     Hydrocele, right     Ventral incisional hernia       Current Outpatient Prescriptions on File Prior to Encounter   Medication Sig Dispense Refill     atorvastatin (LIPITOR) 40 MG tablet TAKE 1 TABLET(40 MG) BY MOUTH EVERY MORNING 90 tablet 2     CALCIUM CARBONATE (CALCIUM 500 ORAL) Take 1  tablet by mouth daily.       cyclobenzaprine (FLEXERIL) 5 MG tablet Take 5 mg by mouth 3 (three) times a day as needed for muscle spasms.       gabapentin (NEURONTIN) 300 MG capsule Take 300 mg by mouth bedtime as needed.       lisinopril (PRINIVIL,ZESTRIL) 5 MG tablet TAKE 1 TABLET(5 MG) BY MOUTH DAILY 90 tablet 2     LOPERAMIDE HCL (IMODIUM A-D ORAL) Take 1 capsule by mouth daily.       naproxen sodium (ALEVE) 220 mg cap Take 1 tablet by mouth every 6 (six) hours as needed.       omeprazole (PRILOSEC) 20 MG capsule Take 20 mg by mouth daily.       psyllium (METAMUCIL) 0.52 gram capsule Take 0.52 g by mouth daily. 3-4 capsules per day       tamsulosin (FLOMAX) 0.4 mg Cp24 Take 1 capsule (0.4 mg total) by mouth 2 (two) times a day. 180 capsule 4     traMADol (ULTRAM) 50 mg tablet Take 50 mg by mouth 4 (four) times a day.       [DISCONTINUED] cyclobenzaprine (FLEXERIL) 5 MG tablet TAKE 1 TABLET BY MOUTH TWICE DAILY AS NEEDED FOR MUSCLE SPASMS 42 tablet 1     HYDROcodone-acetaminophen 5-325 mg per tablet Take 1 tablet by mouth every 8 (eight) hours as needed for pain. 30 tablet 0     No current facility-administered medications on file prior to encounter.        No Known Allergies    Past Medical History:   Diagnosis Date     Arthritis     osteo     BPH (benign prostatic hyperplasia)      Cancer     prostate and bladder     Coronary artery disease      GERD (gastroesophageal reflux disease)      Hydrocele      Hyperlipidemia      Lumbar spinal stenosis      Myocardial infarction 2004     Prostate cancer         Review of Systems  ROS: Positive for numbness and tingling and weakness.  Specifically negative for bowel/bladder dysfunction, balance changes, headache, dizziness, foot drop, fevers, chills, appetite changes, nausea/vomiting, unexplained weight loss. Otherwise 13 systems reviewed are negative. Please see the patient's intake questionnaire from today for details.    Reviewed Social, Family, Past Medical and  Past Surgical history with patient, no significant changes noted since prior visit.     Objective:     /78 (Patient Site: Right Arm, Patient Position: Sitting)  Pulse 77  Wt (!) 228 lb (103.4 kg)  BMI 35.71 kg/m2    PHYSICAL EXAMINATION:    --CONSTITUTIONAL: Well developed, well nourished, healthy appearing individual.  --PSYCHIATRIC: Appropriate mood and affect. No difficulty interacting due to temper, social withdrawal, or memory issues.  --SKIN: Lumbar region is dry and intact. Sensation to light touch is intact in the bilateral L4, L5, and S1 dermatomes.  --RESPIRATORY: Normal rhythm and effort. No abnormal accessory muscle breathing patterns noted.   --MUSCULOSKELETAL:  Normal lumbar lordosis noted, no lateral shift.  --GROSS MOTOR: Easily arises from a seated position.   --LUMBAR SPINE:  Inspection reveals no evidence of deformity, does have slightly elevated left iliac crest. Range of motion is not limited in lumbar flexion, extension, or lateral rotation. No tenderness to palpation bar spine. Straight leg raising in the supine position is negative to radicular pain. Sciatic notch non-tender on the right and significantly tender on the left.   --SACROILIAC JOINT: Positive left Leela's with reproduction of pain to affected extremity. One Finger point test positive left.  --LOWER EXTREMITY MOTOR TESTING:  Plantar flexion left 5/5, right 5/5   Dorsiflexion left 5/5, right 5/5   Great toe MTP extension left 5/5, right 5/5  Knee flexion left 5/5, right 5/5  Knee extension left 5/5, right 5/5   Hip flexion left 5/5, right 5/5  Hip abduction left 5/5, right 5/5  Hip adduction left 5/5, right 5/5   --HIPS: Full range of motion bilaterally. Negative FABERs on the involved lower extremity.   --NEUROLOGIC: Bilateral patellar and achilles reflexes are physiologic and symmetric. Lower extremities are intact to light touch.     RESULTS:   Imaging: MRI of the lumbar spine was reviewed today. The images were shown  to the patient and the findings were explained using a spine model.      Xr Hip Left 2 Or More Vws  Result Date: 9/28/2016  INDICATION: Hip pain. COMPARISON: None.   FINDINGS: Moderately severe DJD involving left hip with near complete loss of superior joint space and circumferential osteophytes. Alignment normal, no fractures. Prior right hip arthroplasty in the low lumbar spine fusion.         Xr Lumbar Spine Flex And Ext 2 Or 3 Vws  9/2/2015 XR LUMBAR SPINE FLEX AND EXT 2 OR 3 VWS 9/2/2015 7:32 AM INDICATION: Lumbar pain. COMPARISON: None.   FINDINGS: Posterior lumbar fusion at L4-L5. No instability with flexion or extension.         Lumbar MRI from 8/5/2015 from [facility].   Conclusion: Multilevel neural lumbar degenerative changes with hyperlordosis, L3-L5 dorsal decompression, L4-5 posterior instrumented/dorsal lateral fusion, and the following specific findings:  1. L3-4 moderate to severe right and moderate left chronic foraminal stenosis with right greater than left L3 ganglionic impingement, in the setting of 3 mm retrolisthesis and advanced bilateral facet degeneration with inflammatory arthropathy on the right. No residual central or subarticular stenosis.   2. L5-S1 medial displacement of the left greater than right descending S1 nerve roots without ana impingement. Moderate right/mild to moderate left foraminal stenosis with right L5 encroachment. Moderate bilateral facet degeneration.    3. L2-L3 moderate bilateral facet degeneration with inflammatory arthropathy on the right.   4. L4-5 mild right foraminal stenosis without residual central or subarticular stenosis.    5. Fusion status at L4-5 is intermediate by MRI. Within these constraints, no spondylolysis, no acute fracture, or no destructive osseous lesion.    6. 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

## 2021-06-13 NOTE — PROGRESS NOTES
F/U  --Last injection 8/8/17 left SI gave him 75% relief  --However, pain worsening x 1 month  --Today C/O ongoing left low back pain with numbness down the left anterior thigh  --Needing to us cane x 3 weeks due to pain  --Rates pain 8/10  --No new symptoms  --Currently going to the [facility] with , last visit today  --[facility] 3 times per week, last visit 10/4/17 and still going per pt (no improvement though)    Medication  --Flexeril 5 mg 1 tab QHS PRN, (at least 3 tab per week)  --Gabapentin 300 mg 1 tab QHS PRN, (at least 3 tab per week)  --Naproxen 220 mg 2 tablets BID PRN  --Tramadol 50 mg 1 tab 4 times daily  --Out of Vicodin but wanting refill

## 2021-06-13 NOTE — PROGRESS NOTES
Pain Clinic Consultation  ENCOUNTER DATE: 2017    Reggie NARVAEZ Eliseo    1945  MRN # [id number]  PCP: Manuel Bone MD    CC: Reggie Gomes 72 y.o. is here today, sent to me by  to discuss   Chief Complaint   Patient presents with     Consult     low back         HPI:     Pain started: 2 years ago.  Pain level: On a scale of 1-10, the patient rates their pain on average at a 4 moderate, severe, constant, during the day, in the morning, at night  Pain is described: Shooting, sharp,  Pain interferes with: Daily activities, sleep, recreational activities, concentration  Aggravating factors: Sitting, walking, going upstairs, get out of the car, getting out of bed  Alleviating factors: Medications, massage, acupuncture, TENS, relaxation  Associated Symptoms: None      Past Medical History:   Diagnosis Date     Arthritis     osteo     BPH (benign prostatic hyperplasia)      Cancer     prostate and bladder     Coronary artery disease      GERD (gastroesophageal reflux disease)      Hydrocele      Hyperlipidemia      Lumbar spinal stenosis      Myocardial infarction 2004     Prostate cancer        Past Surgical History:   Procedure Laterality Date     APPENDECTOMY       BOWEL RESECTION      with colostomy and subsequent takedown of colostomy     COLON SURGERY       CORONARY ANGIOPLASTY      1 stent     HERNIA REPAIR       LUMBAR SPINE SURGERY      X2     ORCHIECTOMY Right 10/27/2015    Procedure: SIMPLE RIGHT ORCHIECTOMY;  Surgeon: Mario Alberto Thompson MD;  Location: [facility];  Service:      PROSTATE SURGERY       REVISION TOTAL HIP ARTHROPLASTY Right      SMALL INTESTINE SURGERY       SPINE SURGERY       TOTAL HIP ARTHROPLASTY Right 2014     VASECTOMY         SOCIAL HISTORY:   reports that he quit smoking about 12 years ago. He has never used smokeless tobacco. He reports that he does not drink alcohol or use illicit drugs.    FAMILY HISTORY  family history includes Heart attack  in his mother; Hypertension in his father. There is no history of Anesthesia problems.    No Known Allergies    Current Outpatient Prescriptions   Medication Sig Dispense Refill     atorvastatin (LIPITOR) 40 MG tablet TAKE 1 TABLET(40 MG) BY MOUTH EVERY MORNING 90 tablet 0     CALCIUM CARBONATE (CALCIUM 500 ORAL) Take 1 tablet by mouth daily.       cyclobenzaprine (FLEXERIL) 5 MG tablet TAKE 1 TABLET BY MOUTH TWICE DAILY AS NEEDED FOR MUSCLE SPASMS 42 tablet 0     gabapentin (NEURONTIN) 300 MG capsule Take 300 mg by mouth bedtime as needed.       lisinopril (PRINIVIL,ZESTRIL) 5 MG tablet TAKE 1 TABLET(5 MG) BY MOUTH DAILY 90 tablet 0     LOPERAMIDE HCL (IMODIUM A-D ORAL) Take 1 capsule by mouth daily.       naproxen sodium (ALEVE) 220 mg cap Take 1 tablet by mouth every 6 (six) hours as needed.       omeprazole (PRILOSEC) 20 MG capsule Take 20 mg by mouth daily.       psyllium (METAMUCIL) 0.52 gram capsule Take 0.52 g by mouth daily. 3-4 capsules per day       tamsulosin (FLOMAX) 0.4 mg Cp24 Take 1 capsule (0.4 mg total) by mouth 2 (two) times a day. 180 capsule 4     traMADol (ULTRAM) 50 mg tablet Take 50 mg by mouth 4 (four) times a day.       No current facility-administered medications for this encounter.        Chemical Dependency History: Denies      Mental Health History: Denies      REVIEW OF SYSTEMS:  12 point systems were reviewed with pt as documented on pt health form of 11/6/2017. ROS was positive for trouble sleeping, contacts, leg cramps, muscle and joint pain, low back pain, easy bruising?,  Frequent urination at nighttime  The rest of systems were pertinent negative.       PHYSICAL EXAM:    Constitutional: 72 y.o. White or  male in NAD; alert and oriented x4/4; appears stated age.  Normal body habitus.  Patient is cooperative, polite, communicates well, makes eye contact, and expresses appropriate concern throughout history. Inspection of the neck demonstrates no skin abnormalities or  deformities.  Posture is appropriate with no obvious listing, scoliosis, or rigidity.  HEENT:   Head: Normocephalic and atraumatic.   Right Ear: External ear normal.   Left Ear: External ear normal.   Nose: Nose normal.   Mouth/Throat: Oropharynx is clear and moist.   Eyes: Conjunctivae and EOM are normal. Right eye exhibits no discharge. Left eye exhibits no discharge.   Neck: Normal range of motion. Neck supple.   Cardiovascular: Normal rate, regular rhythm and normal heart sounds.    Pulmonary/Chest: Effort normal and breath sounds normal. No respiratory distress. No wheezes. Noo rales.  Integumentary: no rashes or breaks in the skin, no open wounds.   Psychiatry:  The patient described normal mood. Affect is normal. No abnormal speech. The patient denies any suicidal ideation. No hallucination. Judgement and insight are normal.     Musculoskeletal exam:      Spine:   Reduced range of motion of  spine with pain extension and flexion .  Tenderness over L5-S1 and PSIS on the left sacroiliac joints test positive on the left.   Gait: Patient walks with a antalgic gait.  Patient uses a cane. Is able to toe and heel walk normally.  Patient rises from a seated position with some difficulty.        Neurological exam:     Motor Examination:  Muscles are symmetrical, no deformities or atrophy.  Motor examination in the lower extremities demonstrates grade 5/5 strength in all major motor groups.   Sensory Examination:  Light touch sensation in the lower extremities is subjectively normal throughout all major dermatomes.   Deep Tendon Reflexes: Reflexes at the Patellar and Achilles are symmetric and grade 1 bilaterally.     Images: MRI of lumbar spine done in 2006.  Shows  L4-5 decompression.  No recent MRI of lumbar spine    Diagnosis  1. Sacroiliac joint dysfunction  MR Lumbar Spine Without Contrast    MR Lumbar Spine Without Contrast   2. Lumbar spine pain           Assessment:    This is a 72-year-old gentleman presented  today with chronic left-sided low back and buttock pain.  He is status post L4-5 decompression in 2006.  He is status post left SI joint injection.  He  received 3 SI joint injection this year.  No recent MRI to evaluate the pathology of his spine.  He reports last SI joint injection helped for short term 4-5 weeks.  He has not done pool therapy.  He reports some relief from acupuncture.  He has been taking tramadol 50 mg 4 times a day.  Currently, tramadol is helping with his pain with no side effects.  On exam, pain is consistent with sacroiliac joint dysfunction.  Also, I would like to rule out any spine pathology such as L5-S1 disc bulge that may contribute to his low back pain.          PLAN:    Available medical records including diagnostic studies were reviewed today with the patient. Plan of care was discussed with the patient. Education about patient pain condition, pathology, and strategies to manage the pain was provided.     Diagnotic Studies/Lab orders: MRI of lumbar spine without contrast    Interventions: This will be discussed based on MRI results and if pain is not well controlled with acupuncture, tramadol, pool therapy    Physical Medicine and rehabilitations: I am referring the patient for a aqua therapy evaluation and treatment for pain control, improvement of function, and assisting the patient to develop a daily routine to support achievement and, where necessary, readjustment of habits and roles according to the patient capacity and life situation.     Non Opioid Management: No recommendation    :  Dated 11/6/2017  Reviewed and consistent.    Opioid Management:    Continue tramadol 50 mg 4 times a day per the primary care physician.  Will discuss trial of Vicodin in the future if tramadol does not help any longer.    SAFETY REMINDERS  No alcohol while taking controlled substances. Alcohol is not an illegal substance, it is unsafe to use in combination. It is a build up of substances in the  body that can be extremely hazardous and may cause respirations to slow to a dangerous rate resulting in hospitalization, brain damage, or death.    Opioid medications have been associated with sharp rise in unintentional overdose and death.  Overdose is a condition characterized by the consumption in excess of a particular drug causing adverse effects. This can happen b/c you are sick, accidentally or intentionally took an extra dose, are on multiple medication that can interact. Someone took your medication and they are not use to the medication.  Symptoms of overdose include:   !breathing slow and shallow, erratic or not at all  !pinpoint pupils, hallucinations  !confusion  !muscle jerks, slack muscles   !extreme sleepiness or loss of alertness   !awake but not able to talk   !face pale or clammy, vomiting, for lighter skinned people, the skin tone turns bluish purple, for darker skinned people, it turns grayish or ashen   If in a situation where overdose is a concern engage the emergency response system (dial 911).    In one study it was noted that 80% of unintentional overdoses occurred in people who were taking a combination of opioids and benzodiazepines.    Do not sell, loan, borrow or share your opioid medication with anyone. Deaths have occurred as a result of this practice. It is illegal and patients are being prosecuted.     Prevent unexpected access/loss of medication: Keep medication locked. Only carry what you need with you.    The patient agrees to the plan and has no further questions, if questions arise the patient knows to call [phone].     Please see your current provider for any continued prescription. They will continue to manage your general health and have requested you see the pain center for pain management. Please discuss any health concerns with your PCP     Follow up: 4 weeks.       Yonny Espinoza MD  American Board Certified Interventional Pain Physicain  [facility]  [address]  [place] [address]  Ph: [phone]  Fax: [phone]

## 2021-06-13 NOTE — PROGRESS NOTES
Assessment:     Diagnoses and all orders for this visit:    History of lumbar fusion    Chronic SI joint pain    Pain of left sacroiliac joint  -     OPS Joint Injection Sacroiliac Joint Unilateral    Chronic left-sided low back pain without sciatica  -     OPS Joint Injection Sacroiliac Joint Unilateral       Reggie Gomes is a 72 y.o. y.o. male with past medical history significant for dyslipidemia, coronary artery disease, prostate cancer, history Right total hip replacement Dr. Yap [facility] orthopedics 2014, prior L3-4 lumbar fusion 2006 as well as lumbar decompression at L3-4 and L4-5 with Dr. Gonzalez 2 years ago who presents today for follow-up regarding ongoing chronic left SI joint pain in which previously he got 75% relief from left SI joint steroid injection ×2 months however pain has flared up and is significant.    Significant tenderness palpation of left SI notch today and increased pain with SI provocative maneuvers.     Plan:     A shared decision making plan was used. The patient's values and choices were respected. Prior medical records from 8/3/17 were reviewed today. The following represents what was discussed and decided upon by the provider and the patient.        -DIAGNOSTIC TESTS: Images were personally reviewed and interpreted.   --Lumbar spine MRI does reveal L4-5 posterior fusion, L3-4 moderate left and severe right chronic foraminal stenosis, and advanced bilateral facet arthropathy.    -INTERVENTIONS: Ordered left SI joint steroid injection today again as he did get significant relief from previous injections ×2 month.  -Did discuss with patient that he should wait 2 weeks postinjection prior to receiving his influenza vaccine.    -MEDICATIONS: Continue hydrocodone and tramadol prescribed by PCP as needed for pain.    --Did discuss with patient that if he feels he needs refills on hydrocodone, he must see his PCP for this as he does take it on a chronic basis and is by PCP  and these medications should be only prescribed by one provider.  Not something will take over prescribing at the spine center.  Discussed side effects of medications and proper use. Patient verbalized understanding.    -PHYSICAL THERAPY: Advised patient continue home exercise program from prior physical therapy sessions which he is doing on a regular basis, is currently seeing a  as well who specializes in SI joint dysfunction.  Discussed the importance of core strengthening, ROM, stretching exercises with the patient and how each of these entities is important in decreasing pain.  Explained to the patient that the purpose of physical therapy is to teach the patient a home exercise program.  These exercises need to be performed every day in order to decrease pain and prevent future occurrences of pain.        -PATIENT EDUCATION:  20 minutes of total visit time was spent face to face with the patient today, 60 % of the visit was spent on counseling, education, and coordinating care.   -5 minutes spent outside of visit time, non-face-to-face time, reviewing chart.    -FOLLOW UP: Follow-up for injection with Dr. Macdonald in 2 weeks postinjection.  Advised to contact clinic if symptoms worsen or change.    Subjective:     Reggie Gomes is a 72 y.o. male who presents today for follow-up regarding acute on chronic left low back pain that has been severe for the last week that is currently an 8/10, a 4 to its best but up to a 9 at its worst more bothersome with standing and walking.  He does report that he got significant relief from last left SI joint steroid injection back in August ×2 months and was doing really well up until recently.  No recent injury, no new pain as he reports this is his same typical pain he had in the past, denies weakness or recent trips or falls.  He does report chronic numbness and tingling in his left anterior thigh as well.  Denies radicular pain however.    *He did see   Marely who previously completed his right total hip replacement 2014, recommended an SI joint injection. Dr. Slaomon had mentioned that if he does not improve or develops groin pain, to further discussed left total hip replacement.      Treatment to Date: Prior L4-5 lumbar fusion in 2006 as well as L3-L4 and L4-L5 decompressive surgery with Dr. Gonzalez in 2012.   Physical therapy ×4 sessions with good relief SI joint and low back June 2016.  SI joint belt with good relief.      Bilateral L3-4 TF TATA 12/11/2015 with 3.5 months of relief.  Bilateral L3-4 TF TATA 3/20/2016 with 90% relief ×2 months.  Left L3-4 facet joint steroid injection 7/11/2016 with 80% relief ×3 months.  Left L3-4 facet joint steroid injection 10/6/2016 with some immediate relief for a couple hours however no lasting relief on this injection.      Left SI Joint Steroid Injection 10/20/2016 with 99% relief x 3 months, preprocedure pain 4/10, postprocedure 0/10.  Left SI joint steroid injection 4/10/2017, preprocedure pain 6/10, postprocedure 1/10.  Left L5-S1 TF TATA 5/9/2017 with 50% relief x 1 week, however patient reports did physical work and aggrivated, preprocedure pain 6/10, postprocedure 0/10.  Left SI Joint Steroid Injection 8/8/17 with 75+% relief for 2 months.  Procedure pain scale 5/10, post 0/10.    Patient Active Problem List   Diagnosis     Dyslipidemia     Coronary Artery Disease     Prostate cancer     Hydrocele, right     Ventral incisional hernia       Current Outpatient Prescriptions on File Prior to Encounter   Medication Sig Dispense Refill     atorvastatin (LIPITOR) 40 MG tablet TAKE 1 TABLET(40 MG) BY MOUTH EVERY MORNING 90 tablet 0     CALCIUM CARBONATE (CALCIUM 500 ORAL) Take 1 tablet by mouth daily.       cyclobenzaprine (FLEXERIL) 5 MG tablet TAKE 1 TABLET BY MOUTH TWICE DAILY AS NEEDED FOR MUSCLE SPASMS 42 tablet 0     gabapentin (NEURONTIN) 300 MG capsule Take 300 mg by mouth bedtime as needed.       lisinopril  (PRINIVIL,ZESTRIL) 5 MG tablet TAKE 1 TABLET(5 MG) BY MOUTH DAILY 90 tablet 0     LOPERAMIDE HCL (IMODIUM A-D ORAL) Take 1 capsule by mouth daily.       naproxen sodium (ALEVE) 220 mg cap Take 1 tablet by mouth every 6 (six) hours as needed.       omeprazole (PRILOSEC) 20 MG capsule Take 20 mg by mouth daily.       psyllium (METAMUCIL) 0.52 gram capsule Take 0.52 g by mouth daily. 3-4 capsules per day       tamsulosin (FLOMAX) 0.4 mg Cp24 Take 1 capsule (0.4 mg total) by mouth 2 (two) times a day. 180 capsule 4     traMADol (ULTRAM) 50 mg tablet Take 50 mg by mouth 4 (four) times a day.       [DISCONTINUED] cyclobenzaprine (FLEXERIL) 5 MG tablet Take 5 mg by mouth 3 (three) times a day as needed for muscle spasms.       HYDROcodone-acetaminophen 5-325 mg per tablet Take 1 tablet by mouth every 8 (eight) hours as needed for pain. 30 tablet 0     No current facility-administered medications on file prior to encounter.        No Known Allergies    Past Medical History:   Diagnosis Date     Arthritis     osteo     BPH (benign prostatic hyperplasia)      Cancer     prostate and bladder     Coronary artery disease      GERD (gastroesophageal reflux disease)      Hydrocele      Hyperlipidemia      Lumbar spinal stenosis      Myocardial infarction 2004     Prostate cancer         Review of Systems  ROS: Positive for numbness and tingling, weakness, balance changes.  Specifically negative for bowel/bladder dysfunction, headache, dizziness, foot drop, fevers, chills, appetite changes, nausea/vomiting, unexplained weight loss. Otherwise 13 systems reviewed are negative. Please see the patient's intake questionnaire from today for details.    Reviewed Social, Family, Past Medical and Past Surgical history with patient, no significant changes noted since prior visit.     Objective:     /82 (Patient Site: Left Arm, Patient Position: Sitting)  Pulse 82  Temp 98.6  F (37  C) (Oral)   Wt (!) 227 lb (103 kg)  SpO2 95%  BMI  35.55 kg/m2    PHYSICAL EXAMINATION:    --CONSTITUTIONAL: Well developed, well nourished, healthy appearing individual.  --PSYCHIATRIC: Appropriate mood and affect. No difficulty interacting due to temper, social withdrawal, or memory issues.  --SKIN: Lumbar region is dry and intact. Sensation to light touch is intact in the bilateral L4, L5, and S1 dermatomes.  --RESPIRATORY: Normal rhythm and effort. No abnormal accessory muscle breathing patterns noted.   --MUSCULOSKELETAL:  Normal lumbar lordosis noted, no lateral shift.  --GROSS MOTOR: Easily arises from a seated position.   --LUMBAR SPINE:  Inspection reveals no evidence of deformity. Range of motion is not limited in lumbar flexion, extension, or lateral rotation. No tenderness to palpation Lumbar spine. Straight leg raising in the supine position is negative to radicular pain. Sciatic notch non-tender on the right and significantly tender on the left.  --SACROILIAC JOINT: Positive left Leela's with reproduction of pain to affected extremity. One Finger point test positive left.  --LOWER EXTREMITY MOTOR TESTING:  Plantar flexion left 5/5, right 5/5   Dorsiflexion left 5/5, right 5/5   Great toe MTP extension left 5/5, right 5/5  Knee flexion left 5/5, right 5/5  Knee extension left 5/5, right 5/5   Hip flexion left 5/5, right 5/5  Hip abduction left 5/5, right 5/5  Hip adduction left 5/5, right 5/5   --HIPS: Full range of motion bilaterally. Negative FABERs on the involved lower extremity.   --NEUROLOGIC: Bilateral patellar and achilles reflexes are physiologic and symmetric. Lower extremities are intact to light touch.     RESULTS:   Imaging: MRI of the lumbar spine was reviewed today. The images were shown to the patient and the findings were explained using a spine model.      Xr Hip Left 2 Or More Vws  Result Date: 9/28/2016  INDICATION: Hip pain. COMPARISON: None.   FINDINGS: Moderately severe DJD involving left hip with near complete loss of superior  joint space and circumferential osteophytes. Alignment normal, no fractures. Prior right hip arthroplasty in the low lumbar spine fusion.           Xr Lumbar Spine Flex And Ext 2 Or 3 Vws  9/2/2015 XR LUMBAR SPINE FLEX AND EXT 2 OR 3 VWS 9/2/2015 7:32 AM INDICATION: Lumbar pain. COMPARISON: None.   FINDINGS: Posterior lumbar fusion at L4-L5. No instability with flexion or extension.          Lumbar MRI from 8/5/2015 from [facility].   Conclusion: Multilevel neural lumbar degenerative changes with hyperlordosis, L3-L5 dorsal decompression, L4-5 posterior instrumented/dorsal lateral fusion, and the following specific findings:  1. L3-4 moderate to severe right and moderate left chronic foraminal stenosis with right greater than left L3 ganglionic impingement, in the setting of 3 mm retrolisthesis and advanced bilateral facet degeneration with inflammatory arthropathy on the right. No residual central or subarticular stenosis.   2. L5-S1 medial displacement of the left greater than right descending S1 nerve roots without ana impingement. Moderate right/mild to moderate left foraminal stenosis with right L5 encroachment. Moderate bilateral facet degeneration.    3. L2-L3 moderate bilateral facet degeneration with inflammatory arthropathy on the right.   4. L4-5 mild right foraminal stenosis without residual central or subarticular stenosis.    5. Fusion status at L4-5 is intermediate by MRI. Within these constraints, no spondylolysis, no acute fracture, or no destructive osseous lesion.    6. 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

## 2021-06-14 NOTE — PROGRESS NOTES
TRIGGER POINT INJECTION  Performed on: 12/5/2017    Pre Procedure Diagnosis:  Myofascial pain  Vital Signs:  As per intra-procedure documentation    The procedure was discussed with Reggie Gomes in detail along with the attendant risks, including but not limited to: nerve injury, infection, bleeding, allergic reaction, or worsening of pain.  Informed consent was obtained and patient elected to proceed.    The skin was cleaned, prepped, and draped in the usual manner.  Patient was placed in the sitting position in the procedure room. The area overlying the back was prepped with alcohol. Then 3 triggerpoints injections were performed in the left lumbar paraspinal, using a 25-gauge, 1.5-inch needle.  A total of 3 mL of 0.25% of bupivacaine was injected, along with 20 milligram of Depo-Medrol.  After the needle was withdrawn, the skin was cleaned of the prep and an appropriate dressing was applied.  Patient tolerated the procedure well, and no complications were noted.      The patient tolerated the procedure well.  The patient was taken to the recovery area after the injection.  There were no complications.      Pre Procedure Pain Scale: 5  Pain Score: 0-No pain (post left side lumbar trigger point injection)    If Reggie Gomes has any questions or concerns after discharge, he was instructed to call us.

## 2021-06-14 NOTE — PROGRESS NOTES
Pain Clinic Follow up  ENCOUNTER DATE: 2017     Reggie Gomes    1945  MRN # [id number]  PCP: Manuel Bone MD     CC: Reggie Gomes 72 y.o. is here today, sent to me by  to discuss        Chief Complaint   Patient presents with           low back       Update 2017:     Reggie returns today for follow-up after his a new consultation to review MRI and discuss plan of care regarding his low back pain.  He reports improvement of his leg pain after pool therapy.  He continues to have periodic pain in the left lower back and buttock.  Reports some referred pain on the left leg on the lateral aspect until his knee level.  All over he reports improvement since his last visit.  He continues to take tramadol along with Advil which control his pain with no side effects.    The rest of his past medical history, past surgical history, family history, allergies, medications, review of system are updated with no change from last visit.      HPI:      Pain started: 2 years ago.  Pain level: On a scale of 1-10, the patient rates their pain on average at a 4 moderate, severe, constant, during the day, in the morning, at night  Pain is described: Shooting, sharp,  Pain interferes with: Daily activities, sleep, recreational activities, concentration  Aggravating factors: Sitting, walking, going upstairs, get out of the car, getting out of bed  Alleviating factors: Medications, massage, acupuncture, TENS, relaxation  Associated Symptoms: None             Past Medical History:   Diagnosis Date     Arthritis       osteo     BPH (benign prostatic hyperplasia)       Cancer       prostate and bladder     Coronary artery disease       GERD (gastroesophageal reflux disease)       Hydrocele       Hyperlipidemia       Lumbar spinal stenosis       Myocardial infarction 2004     Prostate cancer                 Past Surgical History:   Procedure Laterality Date     APPENDECTOMY         BOWEL RESECTION         with  colostomy and subsequent takedown of colostomy     COLON SURGERY         CORONARY ANGIOPLASTY   2004     1 stent     HERNIA REPAIR         LUMBAR SPINE SURGERY         X2     ORCHIECTOMY Right 10/27/2015     Procedure: SIMPLE RIGHT ORCHIECTOMY;  Surgeon: Mario Alberto Thompson MD;  Location: [facility];  Service:      PROSTATE SURGERY         REVISION TOTAL HIP ARTHROPLASTY Right 2014     SMALL INTESTINE SURGERY         SPINE SURGERY         TOTAL HIP ARTHROPLASTY Right 5/2014     VASECTOMY             SOCIAL HISTORY:   reports that he quit smoking about 12 years ago. He has never used smokeless tobacco. He reports that he does not drink alcohol or use illicit drugs.     FAMILY HISTORY  family history includes Heart attack in his mother; Hypertension in his father. There is no history of Anesthesia problems.     No Known Allergies      Current Medications    Current Outpatient Prescriptions   Medication Sig Dispense Refill     atorvastatin (LIPITOR) 40 MG tablet TAKE 1 TABLET(40 MG) BY MOUTH EVERY MORNING 90 tablet 0     CALCIUM CARBONATE (CALCIUM 500 ORAL) Take 1 tablet by mouth daily.         cyclobenzaprine (FLEXERIL) 5 MG tablet TAKE 1 TABLET BY MOUTH TWICE DAILY AS NEEDED FOR MUSCLE SPASMS 42 tablet 0     gabapentin (NEURONTIN) 300 MG capsule Take 300 mg by mouth bedtime as needed.         lisinopril (PRINIVIL,ZESTRIL) 5 MG tablet TAKE 1 TABLET(5 MG) BY MOUTH DAILY 90 tablet 0     LOPERAMIDE HCL (IMODIUM A-D ORAL) Take 1 capsule by mouth daily.         naproxen sodium (ALEVE) 220 mg cap Take 1 tablet by mouth every 6 (six) hours as needed.         omeprazole (PRILOSEC) 20 MG capsule Take 20 mg by mouth daily.         psyllium (METAMUCIL) 0.52 gram capsule Take 0.52 g by mouth daily. 3-4 capsules per day         tamsulosin (FLOMAX) 0.4 mg Cp24 Take 1 capsule (0.4 mg total) by mouth 2 (two) times a day. 180 capsule 4     traMADol (ULTRAM) 50 mg tablet Take 50 mg by mouth 4 (four) times a day.          No  current facility-administered medications for this encounter.             Chemical Dependency History: Denies        Mental Health History: Denies        REVIEW OF SYSTEMS:  12 point systems were reviewed with pt as documented on pt health form of 11/6/2017. ROS was positive for trouble sleeping, contacts, leg cramps, muscle and joint pain, low back pain, easy bruising?,  Frequent urination at nighttime  The rest of systems were pertinent negative.         PHYSICAL EXAM:     Constitutional: 72 y.o. White or  male in NAD; alert and oriented x4/4; appears stated age.  Normal body habitus.  Patient is cooperative, polite, communicates well, makes eye contact, and expresses appropriate concern throughout history. Inspection of the neck demonstrates no skin abnormalities or deformities.  Posture is appropriate with no obvious listing, scoliosis, or rigidity.  HEENT:   Head: Normocephalic and atraumatic.   Right Ear: External ear normal.   Left Ear: External ear normal.   Nose: Nose normal.   Mouth/Throat: Oropharynx is clear and moist.   Eyes: Conjunctivae and EOM are normal. Right eye exhibits no discharge. Left eye exhibits no discharge.   Neck: Normal range of motion. Neck supple.   Cardiovascular: Normal rate, regular rhythm and normal heart sounds.    Pulmonary/Chest: Effort normal and breath sounds normal. No respiratory distress. No wheezes. Noo rales.  Integumentary: no rashes or breaks in the skin, no open wounds.   Psychiatry:  The patient described normal mood. Affect is normal. No abnormal speech. The patient denies any suicidal ideation. No hallucination. Judgement and insight are normal.      Musculoskeletal exam:       Spine:    There was taut band along with tenderness on palpation on the left lumbar paraspinal muscles.  Gait: Patient walks with a less antalgic gait.  Patient uses a cane. Is able to toe and heel walk normally.  Patient rises from a seated position with some difficulty.          Images: MRI of lumbar spine done in 2006.  Shows  L4-5 decompression.  No recent MRI of lumbar spine     Diagnosis  1. Sacroiliac joint dysfunction     2.  Myofascial pain syndrome    3. Lumbar spine pain               Assessment:     This is a 72-year-old gentleman presented today for follow-up regarding his chronic left-sided low back and buttock pain.  He is status post L4-5 decompression in 2006.  He is status post left SI joint injection.  He  received 3 SI joint injection this year.  MRI of lumbar spine is done at [facility] reviewed with the patient.  MRI is showing multilevel spondylosis, L3-4 and L4-5 decompression and L4-5 fusion.  L5-S1 disc and facet degeneration.  There is mild to moderate left subarticular stenosis displacing the descending left S1 nerve root.  There is right posterior lateral cranially extruded disc herniation at L3-4 contributing to the descending right L4 nerve roots and exiting right L3 ganglionic impinge.  All over, he continues to do well.  He does have myofascial pain that is contributing to his pain.  He reports benefit from lumbar epidural steroid injection in the past.         PLAN:     Available medical records including diagnostic studies were reviewed today with the patient. Plan of care was discussed with the patient. Education about patient pain condition, pathology, and strategies to manage the pain was provided.      Diagnotic Studies/Lab orders: No new diagnostics     Interventions:  I will schedule him for trigger point injection in the left lumbar paraspinal muscles.  He is interested to proceed.  Consider repeating lumbar epidural steroid injection at L5-S1 in the future if his low back and leg pain is not well controlled with current plan of care.   Physical Medicine and rehabilitations: Continue pool therapy as instructed     Non Opioid Management: No recommendations.      :  Dated 12/4/2017  Reviewed and consistent.     Opioid Management:     No change of the  plan from last visit.      Continue tramadol 50 mg 4 times a day per the primary care physician.  Will discuss trial of Vicodin in the future if tramadol does not help any longer.     SAFETY REMINDERS  No alcohol while taking controlled substances. Alcohol is not an illegal substance, it is unsafe to use in combination. It is a build up of substances in the body that can be extremely hazardous and may cause respirations to slow to a dangerous rate resulting in hospitalization, brain damage, or death.     Opioid medications have been associated with sharp rise in unintentional overdose and death.  Overdose is a condition characterized by the consumption in excess of a particular drug causing adverse effects. This can happen b/c you are sick, accidentally or intentionally took an extra dose, are on multiple medication that can interact. Someone took your medication and they are not use to the medication.  Symptoms of overdose include:   !breathing slow and shallow, erratic or not at all  !pinpoint pupils, hallucinations  !confusion  !muscle jerks, slack muscles   !extreme sleepiness or loss of alertness   !awake but not able to talk   !face pale or clammy, vomiting, for lighter skinned people, the skin tone turns bluish purple, for darker skinned people, it turns grayish or ashen   If in a situation where overdose is a concern engage the emergency response system (dial 911).     In one study it was noted that 80% of unintentional overdoses occurred in people who were taking a combination of opioids and benzodiazepines.     Do not sell, loan, borrow or share your opioid medication with anyone. Deaths have occurred as a result of this practice. It is illegal and patients are being prosecuted.      Prevent unexpected access/loss of medication: Keep medication locked. Only carry what you need with you.     The patient agrees to the plan and has no further questions, if questions arise the patient knows to call  [phone].      Please see your current provider for any continued prescription. They will continue to manage your general health and have requested you see the pain center for pain management. Please discuss any health concerns with your PCP      Follow up: 4 weeks.         Yonny Espinoza MD  [employer] Board Certified Interventional Pain Physicain  [facility]  [address]  [place] [address]  Ph: [phone]  Fax: [phone]

## 2021-06-15 NOTE — PROGRESS NOTES
Pain Clinic Follow up  ENCOUNTER DATE: 2018      Reggie Gomes    1945  MRN # [id number]  PCP: Manuel Bone MD      CC: Reggie Gomes 72 y.o. is here today, sent to me by  to discuss           Chief Complaint   Patient presents with               low back      Update 2018:      Reggie returns today for follow-up regarding his low back and left leg pain.  He reports improvement of his pain with acupuncture, and CBD oil, tramadol, and pool therapy.  He finished both therapy recently.  He feels he could benefit from more of pool therapy sessions.  He did 5-6 sessions of acupuncture which has been helpful.  He reports less need of using his cane.  He is able to be more active.  He continues to have buttock pain above PSIS along with referred pain on the like.  He is planning to go on a trip to [place] in couple of weeks. He continues to have periodic pain in the left lower back and buttock.    No side effects from his medications     The rest of his past medical history, past surgical history, family history, allergies, medications, review of system are updated with no change from last visit.          Update 2017:      Reggie returns today for follow-up after his a new consultation to review MRI and discuss plan of care regarding his low back pain.  He reports improvement of his leg pain after pool therapy.  He continues to have periodic pain in the left lower back and buttock.  Reports some referred pain on the left leg on the lateral aspect until his knee level.  All over he reports improvement since his last visit.  He continues to take tramadol along with Advil which control his pain with no side effects.     The rest of his past medical history, past surgical history, family history, allergies, medications, review of system are updated with no change from last visit.      HPI:       Pain started: 2 years ago.  Pain level: On a scale of 1-10, the patient rates their pain on  average at a 4 moderate, severe, constant, during the day, in the morning, at night  Pain is described: Shooting, sharp,  Pain interferes with: Daily activities, sleep, recreational activities, concentration  Aggravating factors: Sitting, walking, going upstairs, get out of the car, getting out of bed  Alleviating factors: Medications, massage, acupuncture, TENS, relaxation  Associated Symptoms: None                  Past Medical History:   Diagnosis Date     Arthritis         osteo     BPH (benign prostatic hyperplasia)        Cancer         prostate and bladder     Coronary artery disease        GERD (gastroesophageal reflux disease)        Hydrocele        Hyperlipidemia        Lumbar spinal stenosis        Myocardial infarction 2004     Prostate cancer                        Past Surgical History:   Procedure Laterality Date     APPENDECTOMY           BOWEL RESECTION            with colostomy and subsequent takedown of colostomy     COLON SURGERY           CORONARY ANGIOPLASTY    2004      1 stent     HERNIA REPAIR           LUMBAR SPINE SURGERY            X2     ORCHIECTOMY Right 10/27/2015      Procedure: SIMPLE RIGHT ORCHIECTOMY;  Surgeon: Mario Alberto Thompson MD;  Location: [facility];  Service:      PROSTATE SURGERY           REVISION TOTAL HIP ARTHROPLASTY Right 2014     SMALL INTESTINE SURGERY           SPINE SURGERY           TOTAL HIP ARTHROPLASTY Right 5/2014     VASECTOMY                 SOCIAL HISTORY:   reports that he quit smoking about 12 years ago. He has never used smokeless tobacco. He reports that he does not drink alcohol or use illicit drugs.      FAMILY HISTORY  family history includes Heart attack in his mother; Hypertension in his father. There is no history of Anesthesia problems.      No Known Allergies        Current Medications           Current Outpatient Prescriptions   Medication Sig Dispense Refill     atorvastatin (LIPITOR) 40 MG tablet TAKE 1 TABLET(40 MG) BY MOUTH  EVERY MORNING 90 tablet 0     CALCIUM CARBONATE (CALCIUM 500 ORAL) Take 1 tablet by mouth daily.           cyclobenzaprine (FLEXERIL) 5 MG tablet TAKE 1 TABLET BY MOUTH TWICE DAILY AS NEEDED FOR MUSCLE SPASMS 42 tablet 0     gabapentin (NEURONTIN) 300 MG capsule Take 300 mg by mouth bedtime as needed.           lisinopril (PRINIVIL,ZESTRIL) 5 MG tablet TAKE 1 TABLET(5 MG) BY MOUTH DAILY 90 tablet 0     LOPERAMIDE HCL (IMODIUM A-D ORAL) Take 1 capsule by mouth daily.           naproxen sodium (ALEVE) 220 mg cap Take 1 tablet by mouth every 6 (six) hours as needed.           omeprazole (PRILOSEC) 20 MG capsule Take 20 mg by mouth daily.           psyllium (METAMUCIL) 0.52 gram capsule Take 0.52 g by mouth daily. 3-4 capsules per day           tamsulosin (FLOMAX) 0.4 mg Cp24 Take 1 capsule (0.4 mg total) by mouth 2 (two) times a day. 180 capsule 4     traMADol (ULTRAM) 50 mg tablet Take 50 mg by mouth 4 (four) times a day.             No current facility-administered medications for this encounter.               Chemical Dependency History: Denies          Mental Health History: Denies          REVIEW OF SYSTEMS:  12 point systems were reviewed with pt as documented on pt health form of 11/6/2017. ROS was positive for trouble sleeping, contacts, leg cramps, muscle and joint pain, low back pain, easy bruising?,  Frequent urination at nighttime  The rest of systems were pertinent negative.           PHYSICAL EXAM:      Constitutional: 72 y.o. White or  male in NAD; alert and oriented x4/4; appears stated age.  Normal body habitus.  Patient is cooperative, polite, communicates well, makes eye contact, and expresses appropriate concern throughout history. Inspection of the neck demonstrates no skin abnormalities or deformities.  Posture is appropriate with no obvious listing, scoliosis, or rigidity.  HEENT:   Head: Normocephalic and atraumatic.   Right Ear: External ear normal.   Left Ear: External ear normal.    Nose: Nose normal.   Mouth/Throat: Oropharynx is clear and moist.   Eyes: Conjunctivae and EOM are normal. Right eye exhibits no discharge. Left eye exhibits no discharge.   Neck: Normal range of motion. Neck supple.   Cardiovascular: Normal rate, regular rhythm and normal heart sounds.    Pulmonary/Chest: Effort normal and breath sounds normal. No respiratory distress. No wheezes. Noo rales.  Integumentary: no rashes or breaks in the skin, no open wounds.   Psychiatry:  The patient described normal mood. Affect is normal. No abnormal speech. The patient denies any suicidal ideation. No hallucination. Judgement and insight are normal.       Musculoskeletal exam:        Spine:   Tenderness on the left PSIS.  Positive Leela's on the left.    Gait: Patient walks with a less antalgic gait.  Patient uses a cane. Is able to toe and heel walk normally.  Patient rises from a seated position with some difficulty.           Images: No new imaging.    Diagnosis  1. Sacroiliac joint dysfunction      2.  Myofascial pain syndrome     3.   Lumbar failed back surgery                  Assessment:      This is a 72-year-old gentleman presented today for follow-up regarding his chronic left-sided low back and buttock pain.  His exam today is consistent with left SI joint dysfunction.    All over, patient's pain is better controlled with the combination of acupuncture, pool therapy, CBD oil, tramadol.    Patient can still benefit from sacroiliac joint injection to maximize pain relief and function.          PLAN:      Available medical records including diagnostic studies were reviewed today with the patient. Plan of care was discussed with the patient. Education about patient pain condition, pathology, and strategies to manage the pain was provided.       Diagnotic Studies/Lab orders: No new diagnostics      Interventions:   Patient is interested to repeat sacroiliac joint injection on the left side.    Physical Medicine and  rehabilitations: I will renew his pool therapy prescription after he is back from [place].      Non Opioid Management: No recommendations.  Continue CBD oil.      :  Dated 1/8/2018  Reviewed and consistent.      Opioid Management:      No change of the plan from last visit.       Continue tramadol 50 mg 4 times a day per the primary care physician.        SAFETY REMINDERS  No alcohol while taking controlled substances. Alcohol is not an illegal substance, it is unsafe to use in combination. It is a build up of substances in the body that can be extremely hazardous and may cause respirations to slow to a dangerous rate resulting in hospitalization, brain damage, or death.      Opioid medications have been associated with sharp rise in unintentional overdose and death.  Overdose is a condition characterized by the consumption in excess of a particular drug causing adverse effects. This can happen b/c you are sick, accidentally or intentionally took an extra dose, are on multiple medication that can interact. Someone took your medication and they are not use to the medication.  Symptoms of overdose include:   !breathing slow and shallow, erratic or not at all  !pinpoint pupils, hallucinations  !confusion  !muscle jerks, slack muscles   !extreme sleepiness or loss of alertness   !awake but not able to talk   !face pale or clammy, vomiting, for lighter skinned people, the skin tone turns bluish purple, for darker skinned people, it turns grayish or ashen   If in a situation where overdose is a concern engage the emergency response system (dial 911).      In one study it was noted that 80% of unintentional overdoses occurred in people who were taking a combination of opioids and benzodiazepines.      Do not sell, loan, borrow or share your opioid medication with anyone. Deaths have occurred as a result of this practice. It is illegal and patients are being prosecuted.       Prevent unexpected access/loss of medication:  Keep medication locked. Only carry what you need with you.      The patient agrees to the plan and has no further questions, if questions arise the patient knows to call [phone].       Please see your current provider for any continued prescription. They will continue to manage your general health and have requested you see the pain center for pain management. Please discuss any health concerns with your PCP       Follow up: 6 weeks.           Yonny Espinoza MD  [employer] Board Certified Interventional Pain [facility]  [address]  [place] [address]  Ph: [phone]  Fax: [phone]

## 2021-06-16 PROBLEM — M16.9 OA (OSTEOARTHRITIS) OF HIP: Status: ACTIVE | Noted: 2018-05-11

## 2021-06-16 PROBLEM — M25.559 HIP PAIN: Status: ACTIVE | Noted: 2018-05-08

## 2021-06-16 PROBLEM — M19.90 OSTEOARTHRITIS: Status: ACTIVE | Noted: 2018-05-08

## 2021-06-16 NOTE — PROGRESS NOTES
Pain Clinic Followup  ENCOUNTER DATE: 3/12/2018    Reggie NARVAEZ Eliseo    1945  MRN # [id number]  PCP: Manuel Bone MD    CC: Reggie Gomes 72 y.o. is here today, sent to me by  to discuss   Chief Complaint   Patient presents with     Back Pain         HPI:      Pain level: On a scale of 1-10, the patient rates their pain today 5  Pain is described: Sharp constant in the left buttock/sacroiliac joint area  Aggravating factors: Standing, sitting over 1 hour  Alleviating factors: Laying down  New pain:  None  Since last visit, pain has no change  Associated Symptoms: Weakness in the left leg  Pain interferes with: Sleep, walking cane, activities of daily living, relationships, sexual health      Pertinent Medical History:  No change since last visit.       Past Medical History:   Diagnosis Date     Arthritis     osteo     BPH (benign prostatic hyperplasia)      Cancer     prostate and bladder     Coronary artery disease      GERD (gastroesophageal reflux disease)      Hydrocele      Hyperlipidemia      Lumbar spinal stenosis      Myocardial infarction 2004     Prostate cancer        Past Surgical History:   Procedure Laterality Date     APPENDECTOMY       BOWEL RESECTION      with colostomy and subsequent takedown of colostomy     COLON SURGERY       CORONARY ANGIOPLASTY      1 stent     HERNIA REPAIR       LUMBAR SPINE SURGERY      X2     ORCHIECTOMY Right 10/27/2015    Procedure: SIMPLE RIGHT ORCHIECTOMY;  Surgeon: Mario Alberto Thompson MD;  [place] [facility] [place]:      PROSTATE SURGERY       REVISION TOTAL HIP ARTHROPLASTY Right      SMALL INTESTINE SURGERY       SPINE SURGERY       TOTAL HIP ARTHROPLASTY Right 2014     VASECTOMY         SOCIAL HISTORY:   reports that he quit smoking about 12 years ago. He has never used smokeless tobacco. He reports that he does not drink alcohol or use illicit drugs.    FAMILY HISTORY  family history includes Heart attack in his  mother; Hypertension in his father. There is no history of Anesthesia problems.    No Known Allergies    Current Outpatient Prescriptions   Medication Sig Dispense Refill     atorvastatin (LIPITOR) 40 MG tablet TAKE 1 TABLET(40 MG) BY MOUTH EVERY MORNING 90 tablet 0     CALCIUM CARBONATE (CALCIUM 500 ORAL) Take 1 tablet by mouth daily.       cyclobenzaprine (FLEXERIL) 5 MG tablet TAKE 1 TABLET(5 MG) BY MOUTH DAILY AS NEEDED FOR MUSCLE SPASMS 14 tablet 0     gabapentin (NEURONTIN) 300 MG capsule Take 300 mg by mouth bedtime as needed.       lisinopril (PRINIVIL,ZESTRIL) 5 MG tablet Take 1 tablet (5 mg total) by mouth daily. 90 tablet 0     LOPERAMIDE HCL (IMODIUM A-D ORAL) Take 1 capsule by mouth daily.       naproxen sodium (ALEVE) 220 mg cap Take 1 tablet by mouth every 6 (six) hours as needed.       omeprazole (PRILOSEC) 20 MG capsule Take 20 mg by mouth daily.       psyllium (METAMUCIL) 0.52 gram capsule Take 0.52 g by mouth daily. 3-4 capsules per day       tamsulosin (FLOMAX) 0.4 mg Cp24 Take 1 capsule (0.4 mg total) by mouth 2 (two) times a day. 180 capsule 4     traMADol (ULTRAM) 50 mg tablet Take 50 mg by mouth 4 (four) times a day.       No current facility-administered medications for this encounter.          REVIEW OF SYSTEMS:     12 point systems were reviewed with pt as documented on pt health form and the patient denies any new diagnosis or changes in 12 point system review since the last visit.     PHYSICAL EXAM:    Constitutional: 72 y.o. White or  male in NAD; alert and oriented x4/4; appears stated age.  Normal body habitus.  Patient is cooperative, polite, communicates well, makes eye contact, and expresses appropriate concern throughout history. Inspection of the neck demonstrates no skin abnormalities or deformities.  Posture is appropriate with no obvious listing, scoliosis, or rigidity.  HEENT:   Head: Normocephalic and atraumatic.   Right Ear: External ear normal.   Left Ear:  External ear normal.   Nose: Nose normal.   Mouth/Throat: Oropharynx is clear and moist.   Eyes: Conjunctivae and EOM are normal. Right eye exhibits no discharge. Left eye exhibits no discharge.   Neck: Normal range of motion. Neck supple.   Cardiovascular: Normal rate, regular rhythm and normal heart sounds.    Pulmonary/Chest: Effort normal. No respiratory distress.   Integumentary: no rashes or breaks in the skin, no open wounds.   Psychiatry:  The patient described normal mood. Affect is normal. No abnormal speech. The patient denies any suicidal ideation. No hallucination. Judgement and insight are normal.     Musculoskeletal exam:      Tenderness in the PSIS     Gait: Patient walks with a off balanced gait.  Pelvis is tilted to the left.  Left leg seems shorter than the right leg.  patient rises from a seated position without difficulty.           Images: No new diagnostic studies    Diagnosis    1. Sacroiliac joint dysfunction  Ambulatory referral to Physical Therapy   2. Lumbar spondylosis  Ambulatory referral to Physical Therapy   3. Lumbar disc disease with radiculopathy  Ambulatory referral to Physical Therapy         Assessment:    This is a 72-year-old male patient who returns today for follow-up after her sacroiliac joint injection on the left.  He reports short-term.  Relief.  He reports also temporary from yoga and acupuncture.  He continues to take tramadol which somewhat helpful.  His exam shows left side pelvis tilt along with limping to the left.  His left leg seems to be shorter than the right leg.  We discussed correction of this pelvis tilt and balancing his gait and correction of the legs discrepancy in order to relieve his pain.  Discussed sending him to physical therapy in order to achieve this.  I reviewed MRI again with the patient.  He does have impingement of S1.  Would not rule out lumbar radiculopathy.  I would like to reassess after correction of legs discrepancy and reducing the  tilt of pelvis.      PLAN:    Plan of care was discussed with the patient. Education about patient pain condition, pathology, and strategies to manage the pain was provided.     Diagnotic Studies/Lab orders:  None    Interventions: None    Physical Medicine and rehabilitations: Referral to physical therapy to evaluate and treat for pelvis tilt, to balance gait, and to reduce length discrepancy.       :  Dated 3/12/2018  Reviewed and consistent.    Opioid Management:    Continue tramadol as instructed by primary care physician.     SAFETY REMINDERS  No alcohol while taking controlled substances. Alcohol is not an illegal substance, it is unsafe to use in combination. It is a build up of substances in the body that can be extremely hazardous and may cause respirations to slow to a dangerous rate resulting in hospitalization, brain damage, or death.    Opioid medications have been associated with sharp rise in unintentional overdose and death.  Overdose is a condition characterized by the consumption in excess of a particular drug causing adverse effects. This can happen b/c you are sick, accidentally or intentionally took an extra dose, are on multiple medication that can interact. Someone took your medication and they are not use to the medication.  Symptoms of overdose include:   !breathing slow and shallow, erratic or not at all  !pinpoint pupils, hallucinations  !confusion  !muscle jerks, slack muscles   !extreme sleepiness or loss of alertness   !awake but not able to talk   !face pale or clammy, vomiting, for lighter skinned people, the skin tone turns bluish purple, for darker skinned people, it turns grayish or ashen   If in a situation where overdose is a concern engage the emergency response system (dial 911).    In one study it was noted that 80% of unintentional overdoses occurred in people who were taking a combination of opioids and benzodiazepines.    Do not sell, loan, borrow or share your opioid  medication with anyone. Deaths have occurred as a result of this practice. It is illegal and patients are being prosecuted.     Prevent unexpected access/loss of medication: Keep medication locked. Only carry what you need with you.    The patient agrees to the plan and has no further questions, if questions arise the patient knows to call [phone].               Education:     [email or web address]    Please see your current provider for any continued prescription. They will continue to manage your general health and have requested you see the pain center for pain management. Please discuss any health concerns with your PCP     Follow up: 8 weeks.     Orders placed today:    Orders Placed This Encounter   Procedures     Ambulatory referral to Physical Therapy        Medications:     Requested Prescriptions      No prescriptions requested or ordered in this encounter         Yonny Espinoza MD  [employer] Board Certified Interventional Pain [facility]  [address]  [place] [address]  Ph: [phone]  Fax: [phone]

## 2021-06-16 NOTE — PROGRESS NOTES
"[facility]   [facility]-Pelvic Initial Evaluation    Patient Name: Reggie Gomes  Date of evaluation: 3/12/2018  Referral Diagnosis:   Sacroiliac joint dysfunction [M53.3]  - Primary       Lumbar spondylosis [M47.816]       Lumbar disc disease with radiculopathy [M51.16]       Referring provider: Yonyn Espinoza MD  Visit Diagnosis:     ICD-10-CM    1. Chronic left hip pain M25.552     G89.29    2. Chronic lower back pain M54.5     G89.29    3. Generalized muscle weakness M62.81    4. Abnormality of gait R26.9        Assessment:   Reggie Gomes is a 72 y.o. male who presents to therapy today with chief complaints of chronic L sided hip and lower back pain. Pt estimates his pain has been worsening slowly since June 2017, and notices his left LE strength has also been declining since this time. Pain starts in posterior/superior buttock and radiates to posterior and lateral L LE. Sometimes pain free at rest but is mostly constant. Patient is having difficulty with simple transitions such as in/out of car, sit<>supine, sit<>stand due to weakness and pain. He ambulates with gait abnormality and L LLD found in evaluation today. Evaluation also reveals significant L hip pain and loss of ROM, weakness, and restrictions in lumbar AROM. Patient's s/s do appear to be primarily related to L hip OA with a component of lumbar pain. Patient will benefit from 1:1 skilled physical therapy services to address the above limitations.     Goals:  Pt. will be independent with home exercise program in : 2 weeks  Pt will: reduce MALACHI by >10% for significant change in 8 weeks  Pt will: rate \"average\" pain in L hip 4/10 or less on NPRS to facilitate greater ease of ADL's in 8 weeks  Pt will: sit>stand without increased pain using B UE on arm rest in 8 weeks  Pt will: ambulate >200 feet with SPC with pain <4/10 on NPRS to improve gait and safety in 8 weeks    Patient's expectations/goals are realistic.    Barriers to Learning or " Achieving Goals:  No Barriers.       Plan / Patient Instructions:        Plan of Care:   Authorization / Certification Start Date: 03/12/18  Authorization / Certification End Date: 06/10/18  Authorization / Certification Number of Visits: up to 8-10 visits   Communication with: Referral Source  Patient Related Instruction: Nature of Condition;Treatment plan and rationale;Self Care instruction;Basis of treatment;Body mechanics;Posture;Next steps  Times per Week: 1-2x/week  Number of Weeks: up to 12 weeks  Number of Visits: up to 8-10 visits   Therapeutic Exercise: ROM;Stretching;Strengthening  Neuromuscular Reeducation: kinesio tape;posture;balance/proprioception;TNE;core  Manual Therapy: soft tissue mobilization;myofascial release;joint mobilization;muscle energy  Other Plan #1: therapeutic activity     Plan for next visit: cont strengthening, check on custom orthotics      Subjective:          Reggie Gomes (Jeff) is a 73 y/o male who presents today with chief c/o chronic L sided hip and lower back pain.   Pain has been worsening since 2/2017. Pt had a fall in February 2017 outdoors on his left shoulder. Reports he sustained a L full thickness rotator cuff repair. Pt has been using SPC in July 2017 and feels his strength has been declining since this time.Pt notices he is unable to walk long distances, and has increasing difficulty getting in/out of the car. Pain starts in posterior/superior buttock and radiates to posterior and lateral L LE. Sometimes pain free at rest but is mostly constant. No pain lying in bed, but sleeps in supine and R sidelying. Pt uses tramadol for pain relief. Patient has seen PT in his past for sciatic pain - once 6 months ago, once approximately 1 year ago. Feels this pain is different from last time. Previously pain was located in center of lower back.     Current activity level: performs HEP at home from previous PT exercises.     PMH: Hx of lumbar fusion approximately 3-4 years ago.  Hx of R MARLEN from Dr. Yap 2014.     Social information:   Living Situation:single family home   Occupation:retired    Pain Ratin  Pain rating at best: 3  Pain rating at worst: 9  Pain description: aching, burning, dull, sharp and shooting    Patient reports benefit from:  pain medication         Objective:      Note: Items left blank indicates the item was not performed or not indicated at the time of the evaluation.    Patient Outcome Measures :    Modified Oswestry Low Back Pain Disablity Questionnaire  in %: 56   Scores range from 0-100%, where a score of 0% represents minimal pain and maximal function. The minimal clinically important difference is a score reduction of 12%.    Examination  1. Chronic left hip pain     2. Chronic lower back pain     3. Generalized muscle weakness     4. Abnormality of gait         Precautions/Restrictions: hx of R MARLEN, lumbar fusion, HA  Involved side: Left    Posture Observation:   Standing: pes planus B, significant R >L flat feet   Sitting:     R iliac crest standing    L foot off ground when R knee is extended        Lumbar ROM:    Date:      *Indicate scale AROM AROM AROM   Lumbar Flexion Past patella no increased pain      Lumbar Extension 50% loss to neutral       Right Left Right Left Right Left   Lumbar Sidebending 25% loss no pain 40% loss L hip pain        Lumbar Rotation 40% loss no pain 40% loss L hip pain        Thoracic Flexion      Thoracic Extension      Thoracic Sidebending         Thoracic Rotation           Lower Extremity Strength:     Date:      LE strength/5 Right Left Right Left Right Left   Hip Flexion (L1-3) 4+ 4-       Hip Extension (L5-S1)         Hip Abduction (L4-5)         Hip Adduction (L2-3)         Hip External Rotation         Hip Internal Rotation         Knee Extension (L3-4) 5 4       Knee Flexion         Ankle Dorsiflexion (L4-5) 5 4       Great Toe Extension (L5)         Ankle Plantar flexion (S1)         Abdominals                        Reflex Testing:  Patellar (L3-4):  Achilles (S1-2)     Sensation:      Hip PROM with overpressure:     R hip flexion: limited, IR/ER slightly limited without pain   R hip SLR to approx 45 degrees, -ve neurodynamic testing     L hip: limited flexion > R side   Pain and restriction in IR  ER no pain, slight restriction  L SLR to approx 60 degrees mm tightness, -ve neurodynamic      + L LLD    Lumbar Special Tests:    Lumbar Special Tests Right Left SI Tests Right  Left   Quadrant test   SI Compression     Straight leg raise   SI Distraction     Crossover response   Thigh Thrust     Slump   Sacral Thrust       ABILIO     Trunk extensor endurance test  Resisted Abduction     Prone instability test  Other:         Palpation:       Repeated Motion Testing:      Passive Mobility - Joint Integrity:  PA's:       Treatment Today     TREATMENT MINUTES COMMENTS   Evaluation 30 Lumbar/hip eval    Self-care/ Home management     Manual therapy     Neuromuscular Re-education     Therapeutic Activity     Therapeutic Exercises 25 Initiated HEP:  Exercises:  Exercise #1: SKC, lumbar rotation  Comment #1: HEP x 30 seconds x 2-3 reps  Exercise #2: Supine hip flexor stretch off EOB  Comment #2: HEP x 30 seconds x 2-3 reps  Exercise #3: Ab set  Comment #3: HEP x 10 seconds x 15-20 reps     Discussed different orthotic options for pes planus and potential heel lift for L LLD.   Educated pt he may benefit from custom orthotics if insurance covers.    Gait training     Modality__________________                Total 55    Blank areas are intentional and mean the treatment did not include these items.           PT Evaluation Code: (Please list factors)  Patient History/Comorbidities: CAD, dyslipidemia, prostate cancer, Hx of hernia   Examination: see above  Clinical Presentation: stable  Clinical Decision Making: low    Patient History/  Comorbidities Examination  (body structures and functions, activity limitations, and/or  participation restrictions) Clinical Presentation Clinical Decision Making (Complexity)   No documented Comorbidities or personal factors 1-2 Elements Stable and/or uncomplicated Low   1-2 documented comorbidities or personal factor 3 Elements Evolving clinical presentation with changing characteristics Moderate   3-4 documented comorbidities or personal factors 4 or more Unstable and unpredictable High           Laxmi Pearl  3/12/2018  2:32 PM

## 2021-06-16 NOTE — PROGRESS NOTES
"[facility] Daily Progress     Patient Name: Reggie Gomes  Date: 3/15/2018  Visit # 2/8-10  PTA visit #:    Referral Diagnosis: Hip pain   Referring provider: Manuel Bone MD  Visit Diagnosis:     ICD-10-CM    1. Chronic left hip pain M25.552     G89.29    2. Chronic lower back pain M54.5     G89.29    3. Generalized muscle weakness M62.81    4. Abnormality of gait R26.9          Assessment:   Patient returns to PT reporting improved L hip and lower back pain. He ambulates with visibly improved speed and increased L LE stance time as compared to evaluation. HEP was progressed with additional hip strengthening today and pt is appropriate for progression of this at future visits.  Suggested pt can try Schueler shoes for potential shoe inserts/orthotics and educated on purchasing a heel lift for next PT appt.     Patient is benefitting from skilled physical therapy and is making steady progress toward functional goals.  Patient is appropriate to continue with skilled physical therapy intervention, as indicated by initial plan of care.    Goal Status:  Pt. will be independent with home exercise program in : 2 weeks  Pt will: reduce MALACHI by >10% for significant change in 8 weeks  Pt will: rate \"average\" pain in L hip 4/10 or less on NPRS to facilitate greater ease of ADL's in 8 weeks  Pt will: sit>stand without increased pain using B UE on arm rest in 8 weeks  Pt will: ambulate >200 feet with SPC with pain <4/10 on NPRS to improve gait and safety in 8 weeks    Plan / Patient Education:     Continue with initial plan of care.  Progress with home program as tolerated.    Subjective:   Patient reports significant improvement in pain since evaluation- \"yesterday was a pretty good day\". Pt is surprised at how much stretching has helped his hip thus far.     Pain Ratin    Objective:     Improved L LE stance time; overall improved speed as compared to evaluation     Treatment Today     TREATMENT MINUTES " COMMENTS   Evaluation     Self-care/ Home management     Manual therapy 10 L LE long axis distraction in open pack of L hip x 60 seconds x 6 reps total.    Neuromuscular Re-education     Therapeutic Activity     Therapeutic Exercises 22 Reviewed and progressed HEP:  Exercises:  Exercise #1: SKC, lumbar rotation  Comment #1: HEP x 30 seconds x 2-3 reps  Exercise #2: Supine hip flexor stretch off EOB  Comment #2: HEP x 30 seconds x 2-3 reps  Exercise #3: Ab set- advanced with slow, unilateral marching.   Comment #3: HEP x 20 reps (10 each side) - consistent cues for performing slowly  Exercise #4: Bridge  Comment #4: HEP      Gait training     Modality__________________                Total 32    Blank areas are intentional and mean the treatment did not include these items.       Laxmi Pearl  3/15/2018

## 2021-06-16 NOTE — PROGRESS NOTES
Pain Clinic Followup  ENCOUNTER DATE: 2018    Reggie NARVAEZ Eliseo    1945  MRN # [id number]  PCP: Manuel Bone MD    CC: Regige Gomes 72 y.o. is here today, sent to me by  to discuss   Chief Complaint   Patient presents with     Back Pain         HPI:     Patient returned today for a follow up regarding low back pain     Pain level: On a scale of 1-10, the patient rates their pain today 7 and on average at a 5 to  Pain is described: Sharp  Pain interferes with: Sleep, walking, activities of daily living, sexual health, relationship  Aggravating factors: Walking, standing  Alleviating factors: Laying down, sitting  New pain:  None  Since last visit, pain has no changed  Associated Symptoms: Numbness and weakness.  Numbness is left leg.  Weakness in both legs.  Night pain      Pertinent Medical History:  No change since last visit      Past Medical History:   Diagnosis Date     Arthritis     osteo     BPH (benign prostatic hyperplasia)      Cancer     prostate and bladder     Coronary artery disease      GERD (gastroesophageal reflux disease)      Hydrocele      Hyperlipidemia      Lumbar spinal stenosis      Myocardial infarction      Prostate cancer        Past Surgical History:   Procedure Laterality Date     APPENDECTOMY       BOWEL RESECTION      with colostomy and subsequent takedown of colostomy     COLON SURGERY       CORONARY ANGIOPLASTY      1 stent     HERNIA REPAIR       LUMBAR SPINE SURGERY      X2     ORCHIECTOMY Right 10/27/2015    Procedure: SIMPLE RIGHT ORCHIECTOMY;  Surgeon: Mario Alberto Thompson MD;  Location: [facility] [place];  Service:      PROSTATE SURGERY       REVISION TOTAL HIP ARTHROPLASTY Right      SMALL INTESTINE SURGERY       SPINE SURGERY       TOTAL HIP ARTHROPLASTY Right 2014     VASECTOMY         SOCIAL HISTORY:   reports that he quit smoking about 12 years ago. He has never used smokeless tobacco. He reports that he does not drink alcohol or  use illicit drugs.    FAMILY HISTORY  family history includes Heart attack in his mother; Hypertension in his father. There is no history of Anesthesia problems.    No Known Allergies    Current Outpatient Prescriptions   Medication Sig Dispense Refill     atorvastatin (LIPITOR) 40 MG tablet TAKE 1 TABLET(40 MG) BY MOUTH EVERY MORNING 90 tablet 0     CALCIUM CARBONATE (CALCIUM 500 ORAL) Take 1 tablet by mouth daily.       cyclobenzaprine (FLEXERIL) 5 MG tablet Take 1 tablet (5 mg total) by mouth daily as needed for muscle spasms. 14 tablet 0     gabapentin (NEURONTIN) 300 MG capsule Take 300 mg by mouth bedtime as needed.       lisinopril (PRINIVIL,ZESTRIL) 5 MG tablet TAKE 1 TABLET(5 MG) BY MOUTH DAILY 90 tablet 0     LOPERAMIDE HCL (IMODIUM A-D ORAL) Take 1 capsule by mouth daily.       naproxen sodium (ALEVE) 220 mg cap Take 1 tablet by mouth every 6 (six) hours as needed.       omeprazole (PRILOSEC) 20 MG capsule Take 20 mg by mouth daily.       psyllium (METAMUCIL) 0.52 gram capsule Take 0.52 g by mouth daily. 3-4 capsules per day       tamsulosin (FLOMAX) 0.4 mg Cp24 Take 1 capsule (0.4 mg total) by mouth 2 (two) times a day. 180 capsule 4     traMADol (ULTRAM) 50 mg tablet Take 50 mg by mouth 4 (four) times a day.       No current facility-administered medications for this encounter.          REVIEW OF SYSTEMS:     12 point systems were reviewed with pt as documented on pt health form and the patient denies any new diagnosis or changes in 12 point system review since the last visit.     PHYSICAL EXAM:    Constitutional: 72 y.o. White or  male in NAD; alert and oriented x4/4; appears stated age.  Normal body habitus.  Patient is cooperative, polite, communicates well, makes eye contact, and expresses appropriate concern throughout history. Inspection of the neck demonstrates no skin abnormalities or deformities.  Posture is appropriate with no obvious listing, scoliosis, or rigidity.  HEENT:   Head:  Normocephalic and atraumatic.   Right Ear: External ear normal.   Left Ear: External ear normal.   Nose: Nose normal.   Mouth/Throat: Oropharynx is clear and moist.   Eyes: Conjunctivae and EOM are normal. Right eye exhibits no discharge. Left eye exhibits no discharge.   Neck: Normal range of motion. Neck supple.   Cardiovascular: Normal rate, regular rhythm and normal heart sounds.    Pulmonary/Chest: Effort normal. No respiratory distress.   Integumentary: no rashes or breaks in the skin, no open wounds.   Psychiatry:  The patient described normal mood. Affect is normal. No abnormal speech. The patient denies any suicidal ideation. No hallucination. Judgement and insight are normal.     Musculoskeletal exam:       Gait: Patient walks with a well-balanced gait.  Patient rises from a seated position without difficulty.           Images: No new diagnostic studies    Diagnosis  1. Sacroiliac inflammation    2. Failed back syndrome, lumbar          Assessment:    This is a 72-year-old gentleman presented today for follow-up regarding his chronic left-sided low back and buttock pain.  His exam today is consistent with left SI joint dysfunction.  Left sacral iliac joint injection provided patient with good pain relief and help him with his trip to [place].  Pain return after the trip.  No new pain condition.  Continues to do home exercise.  He continues to take tramadol prescribed by primary care physician.  No side effects or aberrant behavior.       PLAN:    Plan of care was discussed with the patient. Education about patient pain condition, pathology, and strategies to manage the pain was provided.     Diagnotic Studies/Lab orders:  None    Interventions: I discussed risks versus benefits of repeating left sacroiliac joint injection.  Patient is interested to proceed.  Patient would like to schedule this week if possible    Physical Medicine and rehabilitations: Continue doing home exercise for lumbar spine and  sacroiliac joint pain.    :  Dated 2/12/2018  Reviewed and consistent.    Opioid Management:    Continue tramadol as instructed by primary care physician.    SAFETY REMINDERS  No alcohol while taking controlled substances. Alcohol is not an illegal substance, it is unsafe to use in combination. It is a build up of substances in the body that can be extremely hazardous and may cause respirations to slow to a dangerous rate resulting in hospitalization, brain damage, or death.    Opioid medications have been associated with sharp rise in unintentional overdose and death.  Overdose is a condition characterized by the consumption in excess of a particular drug causing adverse effects. This can happen b/c you are sick, accidentally or intentionally took an extra dose, are on multiple medication that can interact. Someone took your medication and they are not use to the medication.  Symptoms of overdose include:   !breathing slow and shallow, erratic or not at all  !pinpoint pupils, hallucinations  !confusion  !muscle jerks, slack muscles   !extreme sleepiness or loss of alertness   !awake but not able to talk   !face pale or clammy, vomiting, for lighter skinned people, the skin tone turns bluish purple, for darker skinned people, it turns grayish or ashen   If in a situation where overdose is a concern engage the emergency response system (dial 911).    In one study it was noted that 80% of unintentional overdoses occurred in people who were taking a combination of opioids and benzodiazepines.    Do not sell, loan, borrow or share your opioid medication with anyone. Deaths have occurred as a result of this practice. It is illegal and patients are being prosecuted.     Prevent unexpected access/loss of medication: Keep medication locked. Only carry what you need with you.    The patient agrees to the plan and has no further questions, if questions arise the patient knows to call [phone].       Please see your  current provider for any continued prescription. They will continue to manage your general health and have requested you see the pain center for pain management. Please discuss any health concerns with your PCP     Follow up: 4 weeks.         Yonny Espinoza MD  [employer] Board Certified Interventional Pain Physicain  [facility]  [address] [place] [address]. [address]  [place] [address]  Ph: [phone]  Fax: [phone]

## 2021-06-17 NOTE — ANESTHESIA PROCEDURE NOTES
Spinal Block    Patient location during procedure: OR  Start time: 5/11/2018 12:10 PM  End time: 5/11/2018 12:15 PM  Reason for block: primary anesthetic    Staffing:  Performing  Anesthesiologist: GENNARO DOVE    Preanesthetic Checklist  Completed: patient identified, risks, benefits, and alternatives discussed, timeout performed, consent obtained, airway assessed, oxygen available, suction available, emergency drugs available and hand hygiene performed  Spinal Block  Patient position: sitting  Prep: ChloraPrep and site prepped and draped  Patient monitoring: blood pressure, continuous pulse ox, cardiac monitor and heart rate  Approach: midline  Location: L2-3  Injection technique: single-shot  Needle type: pencil-tip   Needle gauge: 25 G

## 2021-06-17 NOTE — PROGRESS NOTES
"[facility] Daily Progress     Patient Name: Reggie Gomes  Date: 3/29/2018  Visit # 3/8-10  PTA visit #:    Referral Diagnosis: Hip pain   Referring provider: Manuel Bone MD  Visit Diagnosis:     ICD-10-CM    1. Chronic left hip pain M25.552     G89.29    2. Chronic left-sided low back pain with left-sided sciatica M54.42     G89.29    3. Decreased ROM of lumbar spine M25.60    4. Generalized muscle weakness M62.81    5. Abnormality of gait R26.9          Assessment:   Patient returns to PT reporting increased L hip pain, likely due to a number of different causes. Patient placed 3 layers of adjust-a-lift in his shoe, although was previously instructed to add 1 layer only. He has been performing his own stretches at home and appears to have been stretching into excessive lumbar extension, which likely has contributed to increased lower back pain. HEP was reviewed extensively today and suggested pt needs to perform stretches and strengthening with less force overall and focus on mm activation. Pt in agreement, plan to FU in 1 week.     Patient is benefitting from skilled physical therapy and is making steady progress toward functional goals.  Patient is appropriate to continue with skilled physical therapy intervention, as indicated by initial plan of care.    Goal Status:  Pt. will be independent with home exercise program in : 2 weeks  Pt will: reduce MALACHI by >10% for significant change in 8 weeks  Pt will: rate \"average\" pain in L hip 4/10 or less on NPRS to facilitate greater ease of ADL's in 8 weeks  Pt will: sit>stand without increased pain using B UE on arm rest in 8 weeks  Pt will: ambulate >200 feet with SPC with pain <4/10 on NPRS to improve gait and safety in 8 weeks    Plan / Patient Education:     Continue with initial plan of care.  Progress with home program as tolerated.    Subjective:   Patient put a 3 layer adjust a lift in his shoe and felt increased pain in B LE. Instructed pt " "to use only one layer and adjusted his shoe accordingly.   He also tried a new balance insert. Overall has noticed increased hip pain B over the last week, but pain is improving today. Pt tried some stretching in his doorway and heard \"snap crackle and pop\" in his left hip and lumbar spine.     Pain Ratin/10 pain L hip and lower back     Objective:     Antalgic gait pre treatment, improved gait pattern post treatment.     Pt demo's his doorway stretch which was not given in physical therapy; he appears to be stretching hip flexors on L with extensive lumbar hyperextension, so suggested pt discontinue this stretch.    Treatment Today     TREATMENT MINUTES COMMENTS   Evaluation     Self-care/ Home management     Manual therapy 15 L LE long axis distraction in open pack of L hip x 60 seconds x 8 reps total.      Neuromuscular Re-education     Therapeutic Activity     Therapeutic Exercises 30 Reviewed and progressed HEP:  Cues needed for review as pt has been partially compliant.     Exercises:  Exercise #1: SKC, lumbar rotation- added hamstring stretch in supine and seated with   Comment #1: HEP x 30 seconds x 2-3 reps  Exercise #2: Supine hip flexor stretch off EOB  Comment #2: HEP x 30 seconds x 2-3 reps  Exercise #3: Ab set- advanced with slow, unilateral marching.   Comment #3: HEP x 20 reps (10 each side) - consistent cues for performing slowly- added 3 sec count up, 3 sec down to perform with greater adominal activation   Exercise #4: Bridge  Comment #4: HEP x 3-5 seconds x 10 reps   Exercise #5: Clamshell  Comment #5: HEP no resistance x 20 reps B. TC to keep hips level.      Nu-step WL 4, 5:00 total.      Gait training     Modality__________________                Total 45    Blank areas are intentional and mean the treatment did not include these items.       Laxmi Pearl  3/29/2018    "

## 2021-06-17 NOTE — PROGRESS NOTES
Pain Clinic Followup  ENCOUNTER DATE: 4/10/2018    Reggie NARVAEZ Eliseo    1945  MRN # [id number]  PCP: Manuel Bone MD    CC: Reggie Gomes 72 y.o. 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   No change since last visit.     Function: Has not improved despite physical therapy, acupuncture, chiropractor.      Past Medical History:   Diagnosis Date     Arthritis     osteo     BPH (benign prostatic hyperplasia)      Cancer     prostate and bladder     Coronary artery disease      GERD (gastroesophageal reflux disease)      Hydrocele      Hyperlipidemia      Lumbar spinal stenosis      Myocardial infarction 2004     Prostate cancer        Past Surgical History:   Procedure Laterality Date     APPENDECTOMY       BOWEL RESECTION      with colostomy and subsequent takedown of colostomy     COLON SURGERY       CORONARY ANGIOPLASTY  2004    1 stent     HERNIA REPAIR       LUMBAR SPINE SURGERY      X2     ORCHIECTOMY Right 10/27/2015    Procedure: SIMPLE RIGHT ORCHIECTOMY;  Surgeon: Mario Alberto Thompson MD;  Location: [facility] [place];  Service:      PROSTATE SURGERY       REVISION TOTAL HIP ARTHROPLASTY Right      SMALL INTESTINE SURGERY       SPINE SURGERY       TOTAL HIP ARTHROPLASTY Right 2014     VASECTOMY         SOCIAL  HISTORY:   reports that he quit smoking about 12 years ago. He has never used smokeless tobacco. He reports that he does not drink alcohol or use illicit drugs.    FAMILY HISTORY  family history includes Heart attack in his mother; Hypertension in his father. There is no history of Anesthesia problems.    No Known Allergies    Current Outpatient Prescriptions   Medication Sig Dispense Refill     aspirin (ENTERIC COATED ASPIRIN) 81 MG EC tablet Take 1 tablet (81 mg total) by mouth daily.  0     atorvastatin (LIPITOR) 40 MG tablet TAKE 1 TABLET(40 MG) BY MOUTH EVERY MORNING 90 tablet 0     CALCIUM CARBONATE (CALCIUM 500 ORAL) Take 1 tablet by mouth daily.       cyclobenzaprine (FLEXERIL) 5 MG tablet TAKE 1 TABLET(5 MG) BY MOUTH DAILY AS NEEDED FOR MUSCLE SPASMS 14 tablet 0     gabapentin (NEURONTIN) 300 MG capsule Take 300 mg by mouth bedtime as needed.       lisinopril (PRINIVIL,ZESTRIL) 5 MG tablet Take 1 tablet (5 mg total) by mouth daily. 90 tablet 0     LOPERAMIDE HCL (IMODIUM A-D ORAL) Take 1 capsule by mouth daily.       naproxen sodium (ALEVE) 220 mg cap Take 1 tablet by mouth every 6 (six) hours as needed.       omeprazole (PRILOSEC) 20 MG capsule Take 20 mg by mouth daily.       psyllium (METAMUCIL) 0.52 gram capsule Take 0.52 g by mouth daily. 3-4 capsules per day       tamsulosin (FLOMAX) 0.4 mg Cp24 Take 1 capsule (0.4 mg total) by mouth 2 (two) times a day. 180 capsule 4     HYDROcodone-acetaminophen (VICODIN) 5-300 mg per tablet 1/2 to 1 tab three times a day 90 tablet 0     No current facility-administered medications for this encounter.          REVIEW OF SYSTEMS:     12 point systems were reviewed with pt as documented on pt health form and the patient denies any new diagnosis or changes in 12 point system review since the last visit.     PHYSICAL EXAM:    Constitutional: 72 y.o. White or  male in NAD; alert and oriented x4/4; appears stated age.  Normal body habitus.  Patient is  cooperative, polite, communicates well, makes eye contact, and expresses appropriate concern throughout history. Inspection of the neck demonstrates no skin abnormalities or deformities.  Posture is appropriate with no obvious listing, scoliosis, or rigidity.  HEENT:   Head: Normocephalic and atraumatic.   Right Ear: External ear normal.   Left Ear: External ear normal.   Nose: Nose normal.   Mouth/Throat: Oropharynx is clear and moist.   Eyes: Conjunctivae and EOM are normal. Right eye exhibits no discharge. Left eye exhibits no discharge.   Neck: Normal range of motion. Neck supple.   Cardiovascular: Normal rate, regular rhythm and normal heart sounds.    Pulmonary/Chest: Effort normal. No respiratory distress.   Integumentary: no rashes or breaks in the skin, no open wounds.   Psychiatry:  The patient described normal mood. Affect is normal. No abnormal speech. The patient denies any suicidal ideation. No hallucination. Judgement and insight are normal.     Musculoskeletal exam:       Gait: Patient walks with a well-balanced gait.  Patient rises from a seated position without difficulty.           Images: X-ray of left hip done in 2016 which shows severe osteoarthritis.    Diagnosis    1. Chronic pain syndrome     2. Osteoarthritis of left hip  OPS Joint Injection    HYDROcodone-acetaminophen (VICODIN) 5-300 mg per tablet    Ambulatory referral to Orthopedic Surgery    MR Hip Without Contrast Left   3. Sacroiliac dysfunction  HYDROcodone-acetaminophen (VICODIN) 5-300 mg per tablet   4. Failed back syndrome, lumbar  HYDROcodone-acetaminophen (VICODIN) 5-300 mg per tablet         Assessment:    This is a 72-year-old male patient  presented today for a follow-up guarding his low back and left hip.  His low back pain is well controlled.  He continues to have left hip pain in the superior anterior aspect of his buttock which has not responded to tramadol, physical therapy, acupuncture.  He is status post right hip  replacement.  X-ray of left hip done in 2016 shows severe osteoarthritis.  X-ray was reviewed with the patient today.  Discussed with the patient referral to orthopedic surgery evaluation, discontinuation of tramadol, starting Vicodin, and MRI of left hip to further evaluate his symptoms, and left hip joint injection to help with pain and inflammation.  Discussed with patient opioid management.  He understand as part of the protocol we need to do baseline urine drug screen and reviewed opiate agreement and signed it prior to start Vicodin.      PLAN:     Plan of care was discussed with the patient. Education about patient pain condition, pathology, and strategies to manage the pain was provided.     Diagnotic Studies/Lab orders: MRI of left hip to further evaluate x-ray which shows osteoarthritis    Interventions: I discussed risks versus benefits of left hip joint injection to help with pain and inflammation caused by osteoarthritis    Physical Medicine and rehabilitations: Continue physical therapy as scheduled    Non Opioid Management: Continue gabapentin as scheduled 300 mg at bedtime    :  Dated 4/10/2018  Reviewed and consistent.    Opioid Management:    Urine drug screen today to establish baseline prior to opioid management.  Opiate agreement to review and sign.  Discontinue tramadol.  Start Vicodin 5/300 mg half tablet to 1 tablet 3 times a day as needed.     SAFETY REMINDERS  No alcohol while taking controlled substances. Alcohol is not an illegal substance, it is unsafe to use in combination. It is a build up of substances in the body that can be extremely hazardous and may cause respirations to slow to a dangerous rate resulting in hospitalization, brain damage, or death.    Opioid medications have been associated with sharp rise in unintentional overdose and death.  Overdose is a condition characterized by the consumption in excess of a particular drug causing adverse effects. This can happen b/c you  are sick, accidentally or intentionally took an extra dose, are on multiple medication that can interact. Someone took your medication and they are not use to the medication.  Symptoms of overdose include:   !breathing slow and shallow, erratic or not at all  !pinpoint pupils, hallucinations  !confusion  !muscle jerks, slack muscles   !extreme sleepiness or loss of alertness   !awake but not able to talk   !face pale or clammy, vomiting, for lighter skinned people, the skin tone turns bluish purple, for darker skinned people, it turns grayish or ashen   If in a situation where overdose is a concern engage the emergency response system (dial 911).    In one study it was noted that 80% of unintentional overdoses occurred in people who were taking a combination of opioids and benzodiazepines.    Do not sell, loan, borrow or share your opioid medication with anyone. Deaths have occurred as a result of this practice. It is illegal and patients are being prosecuted.     Prevent unexpected access/loss of medication: Keep medication locked. Only carry what you need with you.    The patient agrees to the plan and has no further questions, if questions arise the patient knows to call [phone].     Referral to orthopedic surgery consultation.  Patient had right hip replaced at [facility] orthopedic.  He would like to go there for the consultation.        Please see your current provider for any continued prescription. They will continue to manage your general health and have requested you see the pain center for pain management. Please discuss any health concerns with your PCP     Follow up: 4 weeks.     Orders placed today:    Orders Placed This Encounter   Procedures     MR Hip Without Contrast Left     Ambulatory referral to Orthopedic Surgery        Medications:     Requested Prescriptions     Signed Prescriptions Disp Refills     HYDROcodone-acetaminophen (VICODIN) 5-300 mg per tablet 90 tablet 0     Si/2 to 1 tab  three times a day         Yonny Espinoza MD  [employer] Certified Interventional Pain Physicain  [facility]  [address]  [place] [address]  Ph: [phone]  Fax: [phone]

## 2021-06-17 NOTE — ANESTHESIA CARE TRANSFER NOTE
Last vitals:   Vitals:    05/11/18 1344   BP: (P) 91/59   Pulse: (P) 97   Resp: (P) 16   Temp: (P) 37.2  C (99  F)   SpO2: (P) 96%     Patient's level of consciousness is awake  Spontaneous respirations: yes  Maintains airway independently: yes  Dentition unchanged: yes  Oropharynx: oropharynx clear of all foreign objects    QCDR Measures:  ASA# 20 - Surgical Safety Checklist: WHO surgical safety checklist completed prior to induction  PQRS# 430 - Adult PONV Prevention: 4558F - Pt received => 2 anti-emetic agents (different classes) preop & intraop  ASA# 8 - Peds PONV Prevention: 4558F - Pt received => 2 anti-emetic agents (different classes) preop & intraop  PQRS# 424 - Callie-op Temp Management: 4559F - At least one body temp DOCUMENTED => 35.5C or 95.9F within required timeframe  PQRS# 426 - PACU Transfer Protocol: - Transfer of care checklist used  ASA# 14 - Acute Post-op Pain: ASA14A - Patient experienced pain >= 7 out of 10   Patient alert and orientated, SAB intact

## 2021-06-17 NOTE — PROGRESS NOTES
"[facility] Discharge Summary  Patient Name: Reggie Gomes  Date: 9/26/2018  Referral Diagnosis: Hip pain   Referring provider: Yonny Espinoza MD  Visit Diagnosis:   1. Chronic left hip pain     2. Decreased ROM of lumbar spine     3. Generalized muscle weakness     4. Chronic left-sided low back pain with left-sided sciatica       Goal Status: goals not re-assessed - pt did not return to PT.  Pt. will be independent with home exercise program in : 2 weeks  Pt will: reduce MALACHI by >10% for significant change in 8 weeks  Pt will: rate \"average\" pain in L hip 4/10 or less on NPRS to facilitate greater ease of ADL's in 8 weeks  Pt will: sit>stand without increased pain using B UE on arm rest in 8 weeks  Pt will: ambulate >200 feet with SPC with pain <4/10 on NPRS to improve gait and safety in 8 weeks      Patient was seen for 5 visits from initial evaluation to 4/12/18 with 0 missed appointments.  Patient pursued MARLEN and did not return for final PT visit.    Therapy will be discontinued at this time.  The patient will need a new referral to resume.    Thank you for your referral.  Laxmi Pearl  9/26/2018  9:18 AM  [facility] Daily Progress     Patient Name: Reggie Gomes  Date: 4/12/2018  Visit # 5/8-10  PTA visit #:    Referral Diagnosis: Hip pain   Referring provider: Yonny Espinoza MD  Visit Diagnosis:     ICD-10-CM    1. Chronic left hip pain M25.552     G89.29    2. Decreased ROM of lumbar spine M25.60    3. Generalized muscle weakness M62.81    4. Chronic left-sided low back pain with left-sided sciatica M54.42     G89.29          Assessment:   Patient reports no improvement in left hip and lower back pain- he will be pursuing orthopedic referral for potential MARLEN. Plan to FU after next MD appt. Pt instructed to continue HEP independently until next visit.     Patient is benefitting from skilled physical therapy and is making steady progress toward functional goals.  Patient is " "appropriate to continue with skilled physical therapy intervention, as indicated by initial plan of care.    Goal Status:  Pt. will be independent with home exercise program in : 2 weeks  Pt will: reduce MALACHI by >10% for significant change in 8 weeks  Pt will: rate \"average\" pain in L hip 4/10 or less on NPRS to facilitate greater ease of ADL's in 8 weeks  Pt will: sit>stand without increased pain using B UE on arm rest in 8 weeks  Pt will: ambulate >200 feet with SPC with pain <4/10 on NPRS to improve gait and safety in 8 weeks    Plan / Patient Education:     Continue with initial plan of care.  Progress with home program as tolerated.    Subjective:   Patient reports he saw MD on 4/10/18 and will be seeing orthopedic surgeon for his left hip pain. No improvement in Sx since starting PT.   He is using Vicodin for pain.     Pain Ratin/10 pain L hip and lower back     Objective:   Improved demo of HEP; less cues needed overall.    Treatment Today     TREATMENT MINUTES COMMENTS   Evaluation     Self-care/ Home management     Manual therapy 10 L LE long axis distraction in open pack of L hip x 60 seconds x 8 reps total.      Neuromuscular Re-education     Therapeutic Activity     Therapeutic Exercises 20 Exercises reviewed and progressed:     Exercise #1: SKC, lumbar rotation- added hamstring stretch in supine and seated   Comment #1: HEP x 30 seconds x 2-3 reps  Exercise #2: Supine hip flexor stretch off EOB  Comment #2: HEP x 30 seconds x 2-3 reps- did not perform during visit  Exercise #3: Ab set- advanced with slow, unilateral marching.   Comment #3: HEP x 20 reps (10 each side) - continued cues for performing more slowly with abd activation. VC for breathign.   Exercise #4: Bridge  Comment #4: HEP x 3-5 seconds x 10 reps - decreased ROM to avoid pain   Exercise #5: Clamshell  Comment #5: HEP no resistance x 20 reps B. TC to keep hips level. - instructed to perform       Nu-step WL 5, 7:00 total.      Gait " training     Modality__________________                Total 30    Blank areas are intentional and mean the treatment did not include these items.       Laxmi Pearl  4/12/2018

## 2021-06-17 NOTE — PROGRESS NOTES
"[facility] Daily Progress     Patient Name: Reggie Gomes  Date: 2018  Visit # 4/8-10  PTA visit #:    Referral Diagnosis: Hip pain   Referring provider: Manuel Bone MD  Visit Diagnosis:     ICD-10-CM    1. Chronic left hip pain M25.552     G89.29    2. Decreased ROM of lumbar spine M25.60    3. Generalized muscle weakness M62.81    4. Chronic left-sided low back pain with left-sided sciatica M54.42     G89.29    5. Abnormality of gait R26.9          Assessment:   Patient continues to c/o intense L sided hip pain with walking, standing, and all transfers-   He ambulates with antalgic gait and decreased L stance using SPC. Patient continues to display restrictions in L hip PROM, AROM, crepitus and pain. Suggested pt may benefit from orthopedic referral for L hip OA, as this appears to be a limiting factor in his overall progress. Pt in agreement.   Plan to contact MD regarding orthopedic referral.    Patient is benefitting from skilled physical therapy and is making steady progress toward functional goals.  Patient is appropriate to continue with skilled physical therapy intervention, as indicated by initial plan of care.    Goal Status:  Pt. will be independent with home exercise program in : 2 weeks  Pt will: reduce MALACHI by >10% for significant change in 8 weeks  Pt will: rate \"average\" pain in L hip 4/10 or less on NPRS to facilitate greater ease of ADL's in 8 weeks  Pt will: sit>stand without increased pain using B UE on arm rest in 8 weeks  Pt will: ambulate >200 feet with SPC with pain <4/10 on NPRS to improve gait and safety in 8 weeks    Plan / Patient Education:     Continue with initial plan of care.  Progress with home program as tolerated.    Subjective:   Patient reports he felt increased hip pain getting out of his car this week. Pt felt improved pain for 3-4 days post treatment, but pain increased after his recent car transfer.     Pain Ratin/10 pain L hip and lower back "     Objective:     Cues again for performing HEP gently; less force with stretching.     Treatment Today     TREATMENT MINUTES COMMENTS   Evaluation     Self-care/ Home management     Manual therapy 20 L LE long axis distraction in open pack of L hip x 60 seconds x 8 reps total.      Neuromuscular Re-education     Therapeutic Activity     Therapeutic Exercises 10 Reviewed and performed:  (see flowsheet for full HEP list)    Exercises:  Exercise #1: SKC, lumbar rotation- added hamstring stretch in supine and seated with       Nu-step WL 5, 6:00 total.      Gait training     Modality__________________                Total 30    Blank areas are intentional and mean the treatment did not include these items.       Laxmi Pearl  4/5/2018

## 2021-06-17 NOTE — PROGRESS NOTES
Pain Clinic Followup  ENCOUNTER DATE: 2018    Reggie NARVAEZ Eliseo    1945  MRN # [id number]  PCP: Manuel Bone MD    CC: Reggie Gomes 72 y.o. is here today, sent to me by  to discuss   Chief Complaint   Patient presents with     Follow-up     hip pain         HPI:      Pain level: On a scale of 1-10, the patient rates their pain today 8-9  Pain is described: Throbbing constant  Aggravating factors: Walking, standing  Alleviating factors: Sitting  New pain:  None  Since last visit, pain has worsened  Associated Symptoms: Night pain, weakness in the left leg  Pain interferes with: Sleep, walking, work, activities of daily living, relationships, sexual health      Pertinent Medical/family/social/medication/allergy History:  Reviewed.   No change since last visit    Function: None      Past Medical History:   Diagnosis Date     Arthritis     osteo     BPH (benign prostatic hyperplasia)      Cancer     prostate and bladder     Coronary artery disease      GERD (gastroesophageal reflux disease)      Hydrocele      Hyperlipidemia      Lumbar spinal stenosis      Myocardial infarction 2004     Prostate cancer        Past Surgical History:   Procedure Laterality Date     APPENDECTOMY       BOWEL RESECTION      with colostomy and subsequent takedown of colostomy     COLON SURGERY       CORONARY ANGIOPLASTY  2004    1 stent     HERNIA REPAIR       LUMBAR SPINE SURGERY      X2     ORCHIECTOMY Right 10/27/2015    Procedure: SIMPLE RIGHT ORCHIECTOMY;  Surgeon: Mario Alberto Thompson MD;  Location: [facility];  Service:      PROSTATE SURGERY       REVISION TOTAL HIP ARTHROPLASTY Right      SMALL INTESTINE SURGERY       SPINE SURGERY       TOTAL HIP ARTHROPLASTY Right 2014     VASECTOMY         SOCIAL HISTORY:   reports that he quit smoking about 12 years ago. He has never used smokeless tobacco. He reports that he does not drink alcohol or use illicit drugs.    FAMILY HISTORY  family history  includes Heart attack in his mother; Hypertension in his father. There is no history of Anesthesia problems.    No Known Allergies    Current Outpatient Prescriptions   Medication Sig Dispense Refill     atorvastatin (LIPITOR) 40 MG tablet TAKE 1 TABLET(40 MG) BY MOUTH EVERY MORNING 90 tablet 0     CALCIUM CARBONATE (CALCIUM 500 ORAL) Take 1 tablet by mouth daily.       gabapentin (NEURONTIN) 300 MG capsule Take 300 mg by mouth bedtime as needed.       lisinopril (PRINIVIL,ZESTRIL) 5 MG tablet Take 1 tablet (5 mg total) by mouth daily. 90 tablet 0     LOPERAMIDE HCL (IMODIUM A-D ORAL) Take 1 capsule by mouth daily.       omeprazole (PRILOSEC) 20 MG capsule Take 20 mg by mouth daily.       psyllium (METAMUCIL) 0.52 gram capsule Take 0.52 g by mouth daily. 3-4 capsules per day       tamsulosin (FLOMAX) 0.4 mg Cp24 Take 1 capsule (0.4 mg total) by mouth 2 (two) times a day. 180 capsule 4     aspirin (ENTERIC COATED ASPIRIN) 81 MG EC tablet Take 1 tablet (81 mg total) by mouth daily.  0     cyclobenzaprine (FLEXERIL) 5 MG tablet TAKE 1 TABLET(5 MG) BY MOUTH DAILY AS NEEDED FOR MUSCLE SPASMS 14 tablet 0     [START ON 5/10/2018] HYDROcodone-acetaminophen 5-325 mg per tablet 1 tablet by mouth four times daily. 120 tablet 0     naproxen sodium (ALEVE) 220 mg cap Take 1 tablet by mouth every 6 (six) hours as needed.       No current facility-administered medications for this encounter.          REVIEW OF SYSTEMS:     12 point systems were reviewed with pt as documented on pt health form and the patient denies any new diagnosis or changes in 12 point system review since the last visit.     PHYSICAL EXAM:    Constitutional: 72 y.o. White or  male in NAD; alert and oriented x4/4; appears stated age.  Normal body habitus.  Patient is cooperative, polite, communicates well, makes eye contact, and expresses appropriate concern throughout history. Inspection of the neck demonstrates no skin abnormalities or  deformities.  Posture is appropriate with no obvious listing, scoliosis, or rigidity.  HEENT:   Head: Normocephalic and atraumatic.   Right Ear: External ear normal.   Left Ear: External ear normal.   Nose: Nose normal.   Mouth/Throat: Oropharynx is clear and moist.   Eyes: Conjunctivae and EOM are normal. Right eye exhibits no discharge. Left eye exhibits no discharge.   Neck: Normal range of motion. Neck supple.   Cardiovascular: Normal rate, regular rhythm and normal heart sounds.    Pulmonary/Chest: Effort normal. No respiratory distress.   Integumentary: no rashes or breaks in the skin, no open wounds.   Psychiatry:  The patient described normal mood. Affect is normal. No abnormal speech. The patient denies any suicidal ideation. No hallucination. Judgement and insight are normal.     Musculoskeletal exam:       Gait: Patient walks with a off-balanced gait.  Patient rises from a seated position without difficulty.           Images: No new diagnostic studies    Diagnosis    1. Chronic pain syndrome     2. Chronic pain  HYDROcodone-acetaminophen 5-325 mg per tablet   3. Pain of left hip joint     4. Osteoarthritis           Assessment:    Mr. Gomes returns today for follow-up regarding his chronic hip pain for medication management.  He is scheduled for hip replacement on May 11th.  He increase his oxycodone to 4 times a day instead of 3 times a day which provide him with better pain relief.  He continues to take gabapentin 300 mg at bedtime for sleep and pain.  He cannot take gabapentin during the day as it caused some sedation.  Tolerates medication with no adverse effects.  Discussed with patient that he should not increase medication on his own without notifying me.  He voiced understanding pain.  We agreed to increase his Norco to 4 times instead of 3 times a day.  He will continue gabapentin prescribed by his primary care physician.  Discussed to return to the clinic in 2 months after his hip  surgery.      PLAN:     Plan of care was discussed with the patient. Education about patient pain condition, pathology, and strategies to manage the pain was provided.     :  Dated 5/8/2018  Reviewed and consistent.    Opioid Management:    Increase hydrocodone 5/325 mg 1 tablet 4 times a day.    SAFETY REMINDERS  No alcohol while taking controlled substances. Alcohol is not an illegal substance, it is unsafe to use in combination. It is a build up of substances in the body that can be extremely hazardous and may cause respirations to slow to a dangerous rate resulting in hospitalization, brain damage, or death.    Opioid medications have been associated with sharp rise in unintentional overdose and death.  Overdose is a condition characterized by the consumption in excess of a particular drug causing adverse effects. This can happen b/c you are sick, accidentally or intentionally took an extra dose, are on multiple medication that can interact. Someone took your medication and they are not use to the medication.  Symptoms of overdose include:   !breathing slow and shallow, erratic or not at all  !pinpoint pupils, hallucinations  !confusion  !muscle jerks, slack muscles   !extreme sleepiness or loss of alertness   !awake but not able to talk   !face pale or clammy, vomiting, for lighter skinned people, the skin tone turns bluish purple, for darker skinned people, it turns grayish or ashen   If in a situation where overdose is a concern engage the emergency response system (dial 911).    In one study it was noted that 80% of unintentional overdoses occurred in people who were taking a combination of opioids and benzodiazepines.    Do not sell, loan, borrow or share your opioid medication with anyone. Deaths have occurred as a result of this practice. It is illegal and patients are being prosecuted.     Prevent unexpected access/loss of medication: Keep medication locked. Only carry what you need with you.    The  patient agrees to the plan and has no further questions, if questions arise the patient knows to call [phone].       Please see your current provider for any continued prescription. They will continue to manage your general health and have requested you see the pain center for pain management. Please discuss any health concerns with your PCP     Follow up: 8 weeks after hip surgery      Medications:     Requested Prescriptions     Signed Prescriptions Disp Refills     HYDROcodone-acetaminophen 5-325 mg per tablet 120 tablet 0     Si tablet by mouth four times daily.         Yonny Espinoza MD  [employer] Board Certified Interventional Pain Physicain  [facility]  [address]  [place] [address]  Ph: [phone]  Fax: [phone]

## 2021-06-17 NOTE — ANESTHESIA PREPROCEDURE EVALUATION
Anesthesia Evaluation      Patient summary reviewed   No history of anesthetic complications     Airway   Mallampati: I  Neck ROM: full   Pulmonary - normal exam    breath sounds clear to auscultation  (+) a smoker (Former smoker)                         Cardiovascular - normal exam  (+) hypertension well controlled, past MI (2007), CAD, CABG/stent (To LAD 2007), , hypercholesterolemia,     ECG reviewed (Normal sinus rhythmn)  Rhythm: regular  Rate: normal,         Neuro/Psych    (+) chronic pain    Endo/Other    (+) arthritis, obesity (BMI 35),      GI/Hepatic/Renal    (+) GERD well controlled,        Other findings: Bladder and prostate cancer      Dental - normal exam                        Anesthesia Plan  Planned anesthetic: spinal    ASA 3   Induction: intravenous   Anesthetic plan and risks discussed with: patient and spouse    Post-op plan: routine recovery

## 2021-06-18 NOTE — ANESTHESIA POSTPROCEDURE EVALUATION
Patient: Reggie Gomes  LEFT, ARTHROPLASTY, TOTAL, TOTAL DIRECT ANTERIOR APPROACH  Anesthesia type: spinal    Patient location: PACU  Last vitals:   Vitals:    05/11/18 1840   BP: 124/76   Pulse: 95   Resp: 18   Temp: 36.9  C (98.5  F)   SpO2: 95%     Post vital signs: stable  Level of consciousness: awake and responds to simple questions  Post-anesthesia pain: pain controlled  Post-anesthesia nausea and vomiting: no  Pulmonary: unassisted, return to baseline  Cardiovascular: stable and blood pressure at baseline  Hydration: adequate  Anesthetic events: no    QCDR Measures:  ASA# 11 - Callie-op Cardiac Arrest: ASA11B - Patient did NOT experience unanticipated cardiac arrest  ASA# 12 - Callie-op Mortality Rate: ASA12B - Patient did NOT die  ASA# 13 - PACU Re-Intubation Rate: ASA13B - Patient did NOT require a new airway mgmt  ASA# 10 - Composite Anes Safety: ASA10A - No serious adverse event    Additional Notes:    Kameron Carter

## 2021-06-20 ENCOUNTER — HOSPITAL ENCOUNTER (EMERGENCY)
Dept: EMERGENCY MEDICINE | Facility: HOSPITAL | Age: 76
Discharge: HOME | End: 2021-06-20
Attending: EMERGENCY MEDICINE
Payer: MEDICARE

## 2021-06-20 DIAGNOSIS — N30.01 ACUTE CYSTITIS WITH HEMATURIA: ICD-10-CM

## 2021-06-20 LAB
ALBUMIN UR-MCNC: ABNORMAL G/DL
ANION GAP SERPL CALCULATED.3IONS-SCNC: 8 MMOL/L (ref 5–18)
APPEARANCE UR: ABNORMAL
BASOPHILS # BLD AUTO: 0.1 THOU/UL (ref 0–0.2)
BASOPHILS NFR BLD AUTO: 1 % (ref 0–2)
BILIRUB UR QL STRIP: NEGATIVE
BUN SERPL-MCNC: 13 MG/DL (ref 8–28)
CALCIUM SERPL-MCNC: 8.7 MG/DL (ref 8.5–10.5)
CHLORIDE BLD-SCNC: 109 MMOL/L (ref 98–107)
CO2 SERPL-SCNC: 26 MMOL/L (ref 22–31)
COLOR UR AUTO: YELLOW
CREAT SERPL-MCNC: 0.81 MG/DL (ref 0.7–1.3)
EOSINOPHIL # BLD AUTO: 0.3 THOU/UL (ref 0–0.4)
EOSINOPHIL NFR BLD AUTO: 4 % (ref 0–6)
ERYTHROCYTE [DISTWIDTH] IN BLOOD BY AUTOMATED COUNT: 13.5 % (ref 11–14.5)
GFR SERPL CREATININE-BSD FRML MDRD: >60 ML/MIN/1.73M2
GLUCOSE BLD-MCNC: 111 MG/DL (ref 70–125)
GLUCOSE UR STRIP-MCNC: NEGATIVE MG/DL
HCT VFR BLD AUTO: 38.6 % (ref 40–54)
HGB BLD-MCNC: 12.8 G/DL (ref 14–18)
HGB UR QL STRIP: ABNORMAL
IMM GRANULOCYTES # BLD: 0 THOU/UL
IMM GRANULOCYTES NFR BLD: 0 %
KETONES UR STRIP-MCNC: NEGATIVE MG/DL
LEUKOCYTE ESTERASE UR QL STRIP: ABNORMAL
LYMPHOCYTES # BLD AUTO: 1.3 THOU/UL (ref 0.8–4.4)
LYMPHOCYTES NFR BLD AUTO: 17 % (ref 20–40)
MCH RBC QN AUTO: 35.4 PG (ref 27–34)
MCHC RBC AUTO-ENTMCNC: 33.2 G/DL (ref 32–36)
MCV RBC AUTO: 107 FL (ref 80–100)
MONOCYTES # BLD AUTO: 0.5 THOU/UL (ref 0–0.9)
MONOCYTES NFR BLD AUTO: 6 % (ref 2–10)
MUCOUS THREADS #/AREA URNS LPF: PRESENT LPF
NEUTROPHILS # BLD AUTO: 5.2 THOU/UL (ref 2–7.7)
NEUTROPHILS NFR BLD AUTO: 72 % (ref 50–70)
NITRATE UR QL: POSITIVE
PH UR STRIP: 5.5 [PH] (ref 5–8)
PLATELET # BLD AUTO: 158 THOU/UL (ref 140–440)
PMV BLD AUTO: 11.6 FL (ref 8.5–12.5)
POTASSIUM BLD-SCNC: 4.3 MMOL/L (ref 3.5–5)
RBC # BLD AUTO: 3.62 MILL/UL (ref 4.4–6.2)
RBC URINE: >182 HPF
SODIUM SERPL-SCNC: 143 MMOL/L (ref 136–145)
SP GR UR STRIP: 1.02 (ref 1–1.03)
SQUAMOUS EPITHELIAL: 0 /HPF
UROBILINOGEN UR STRIP-ACNC: ABNORMAL
WBC URINE: >182 HPF
WBC: 7.2 THOU/UL (ref 4–11)

## 2021-06-20 ASSESSMENT — MIFFLIN-ST. JEOR: SCORE: 1652.98

## 2021-06-22 LAB — BACTERIA SPEC CULT: ABNORMAL

## 2021-06-25 NOTE — ED TRIAGE NOTES
Pt. Stated that he has been having urinary frequency and painful urination since Thursday.  Denied any blood in urine but stated that he doesn't feel like he's emptying his bladder because he feels the urge to urinate every couple minutes

## 2021-06-25 NOTE — PROGRESS NOTES
Progress Notes by Kristal Castro PT at 6/26/2017 10:00 AM     Author: Kristal Castro PT Service: -- Author Type: Physical Therapist    Filed: 6/26/2017  1:09 PM Encounter Date: 6/26/2017 Status: Attested    : Kristal Castro PT (Physical Therapist) Cosigner: Lena Bhatti CNP at 6/26/2017  4:18 PM    Attestation signed by Lena Bhatti CNP at 6/26/2017  4:18 PM    Follow therapist recommendations please                    [facility] Certification Request    June 26, 2017      Patient: Reggie Gomes  MR Number: [id number]  YOB: 1945  Date of Visit: 6/26/2017      Dear Lena Bhatti:    Thank you for this referral.   We are seeing Reggie Gomes for Physical Therapy of his low back pain.    Medicare and/or Medicaid requires physician review and approval of the treatment plan. Please review the plan of care and verify that you agree with the therapy plan of care by co-signing this note.      Plan of Care  Authorization / Certification Start Date: 06/26/17  Authorization / Certification End Date: 09/24/17  Authorization / Certification Number of Visits: 12  Communication with: Referral Source  Patient Related Instruction: Nature of Condition;Treatment plan and rationale;Self Care instruction;Posture;Expected outcome;Body mechanics;Basis of treatment;Next steps;Precautions  Times per Week: 1-2  Number of Weeks: 12  Number of Visits: 12  Therapeutic Exercise: ROM;Strengthening;Stretching  Neuromuscular Reeducation: kinesio tape;posture;balance/proprioception;TNE;core  Manual Therapy: soft tissue mobilization;myofascial release;muscle energy;strain counterstrain;joint mobilization  Modalities: electrical stimulation;TENS;cold pack;hot pack;traction (prn)  Gait Training: to reduce pain with ambulation  Equipment: theraband    Goals:  Pt. will be independent with home exercise program in : 4 weeks (to self-manage pain and improve function)  Pt. will be able to  walk : 30 minutes;in 12 weeks;for community mobility;for exercise/recreation;with less pain (with less than 3/10 pain)  Patient will ascend / descend: stairs;with recipricol gait;with less pain;in 12 weeks (less than 3/10 pain)  No Data Recorded      If you have any questions or concerns, please don't hesitate to call.    Sincerely,      Kristal Vega, PT        Physician recommendation:     ___ Follow therapist's recommendation        ___ Modify therapy      *Physician co-signature indicates they certify the need for these services furnished within this plan and while under their care.        [facility]   [facility]-Pel[facility] Initial Evaluation    Patient Name: Reggie Martin  Date of evaluation: 6/26/2017  Referral Diagnosis:   Chronic SI joint pain [M53.3, G89.29]  - Primary       S/P lumbar fusion [Z98.1]       Lumbar facet arthropathy [M12.88]       Chronic left-sided low back pain with left-sided sciatica [M54.42, G89.29]        Referring provider: Lena Bhatti C*  Visit Diagnosis:     ICD-10-CM    1. Chronic left-sided low back pain with left-sided sciatica M54.42     G89.29    2. Generalized muscle weakness M62.81    3. Decreased ROM of lumbar spine M25.60        PMH: prostate cancer, lumbar fusion and decompression    Assessment:       MRI per chart review: narrowing at L5/S1    Impairments in  pain, posture, ROM, joint mobility, strength, motor function, sensory function, ADL's, gait/locomotion and balance  The POC is dynamic and will be modified on an ongoing basis.  Barriers to achieving goals as noted in the assessment section may affect outcome.  Prognosis to achieve goals is  good   Skilled PT is required to reduce pain and improve function.  Pt. is appropriate for skilled PT intervention as outlined in the Plan of Care (POC).  Pt. is a good candidate for skilled PT services to improve pain levels and function.    Pt presents with chronic history of low back and left leg  pain. His has a history of 2 previous lumbar spine surgeries including a fusion at L3-4. His pain is worse with standing, walking, lifting, sleeping and he has difficulty with the stairs. He has negative SI joint and neurodynamic testing today along with proximal hip and abdominal musculature weakness. He has a history of a R MARLEN and is limited césar with hip PROM flexion, IR and ER.    Goals:  Pt. will be independent with home exercise program in : 4 weeks (to self-manage pain and improve function)  Pt. will be able to walk : 30 minutes;in 12 weeks;for community mobility;for exercise/recreation;with less pain (with less than 3/10 pain)  Patient will ascend / descend: stairs;with recipricol gait;with less pain;in 12 weeks (less than 3/10 pain)  No Data Recorded    Patient's expectations/goals are realistic.    Barriers to Learning or Achieving Goals:  Chronicity of the problem.       Plan / Patient Instructions:        Plan of Care:   Authorization / Certification Start Date: 06/26/17  Authorization / Certification End Date: 09/24/17  Authorization / Certification Number of Visits: 12  Communication with: Referral Source  Patient Related Instruction: Nature of Condition;Treatment plan and rationale;Self Care instruction;Posture;Expected outcome;Body mechanics;Basis of treatment;Next steps;Precautions  Times per Week: 1-2  Number of Weeks: 12  Number of Visits: 12  Therapeutic Exercise: ROM;Strengthening;Stretching  Neuromuscular Reeducation: kinesio tape;posture;balance/proprioception;TNE;core  Manual Therapy: soft tissue mobilization;myofascial release;muscle energy;strain counterstrain;joint mobilization  Modalities: electrical stimulation;TENS;cold pack;hot pack;traction (prn)  Gait Training: to reduce pain with ambulation  Equipment: theraband    Plan for next visit: progress abdominal and hip strengthening, L hip mobilizations/ROM exercises     Subjective:         Social information:   Occupation:retired   Work  Status:NA   Equipment Available: None    History of Present Illness:    Reggie is a 72 y.o. male who presents to therapy today with complaints of left-sided low back, posterior buttock and L anterior distal thigh. 1-2 month post injection at L5-S1 and reports 50% relief of symptoms for 1-2 weeks after. Now, does not report any pain relief from the injection. Pt has had 5 steroid injections since 2016. Date of onset/duration of symptoms is over 1 year. Onset was gradual. Pt has had PT prior for his back pain and felt the injections usually helped more. Pt has a previous lumbar surgeries.Symptoms are getting worse.  He describes their previous level of function as not limited. Weakness on his left leg walking up stairs.    PMH: prostate cancer, R MARLEN anterior approach, L3-4 lumbar fusion in , lumbar decompression L3-4, L4-5 2 years ago    Pain Ratin  Pain rating at best: 3  Pain rating at worst: 8  Pain description: aching; n/t on left thigh    Functional limitations are described as occurring with:   Stand 15-20 min  Sit 30 min  walk 5 min  Lift  Stairs  Transitions  Kneel/squat  Sleep- wakes 2-3x/night    Patient reports benefit from:  laying on back         Objective:      Note: Items left blank indicates the item was not performed or not indicated at the time of the evaluation.    Patient Outcome Measures :    Modified Oswestry Low Back Pain Disablity Questionnaire  in %: 44   Scores range from 0-100%, where a score of 0% represents minimal pain and maximal function. The minimal clinically important difference is a score reduction of 12%.    Examination  1. Chronic left-sided low back pain with left-sided sciatica     2. Generalized muscle weakness     3. Decreased ROM of lumbar spine       Precautions/Restrictions: previous lumbar fusion  Involved side: Left    Lumbar ROM:    Date: 2017     *Indicate scale AROM AROM AROM   Lumbar Flexion To ankles with Clinton's sign     Lumbar Extension mild       Right Left Right Left Right Left   Lumbar Sidebending mod Mod with pain       Lumbar Rotation mod mod       Thoracic Flexion      Thoracic Extension      Thoracic Sidebending         Thoracic Rotation           Lower Extremity Strength:     Date: 6/26/2017     LE strength/5 Right Left Right Left Right Left   Hip Flexion (L1-3) 5 5 with pain       Hip Extension (L5-S1) 3 3-       Hip Abduction (L4-5)         Hip Adduction (L2-3)         Hip External Rotation 4 3+       Hip Internal Rotation         Knee Extension (L3-4) 5 4       Knee Flexion 5 4       Ankle Dorsiflexion (L4-5) 5 5       Great Toe Extension (L5)         Ankle Plantar flexion (S1)         Abdominals        Sensation           Reflex Testing  Lumbar Dermatomes Right Left UE Reflexes Right Left   Iliac Crest and Groin (L1)   Biceps (C5-6)     Anterior Medial Thigh (L2)   Brachioradialis (C5-6)     Anterior Thigh, Medial Epicondyle Femur (L3)   Triceps (C7-8)     Lateral Thigh, Anterior Knee, Medial Leg/Malleolus (L4)   Hoffmanns test     Lateral Leg, Dorsal Foot (L5)   LE Reflexes     Lateral Foot (S1)   Patellar (L3-4)     Posterior Leg (S2)   Achilles (S1-2)     Other:   Babinski Response       Palpation: not tender at césar lumbar paraspinals and piriformis    Lumbar Special Tests:     Lumbar Special Tests Right Left SI Tests Right  Left   Quadrant test - - SI Compression - -   Straight leg raise - - SI Distraction     Crossover response   POSH Test     Slump - - Sacral Thrust - -   Sit-up test  FADIR Not tested +   Trunk extensor endurance test  Resisted Abduction     Prone instability test  Other: ABILIO Not tested -   Pubic shotgun  Other:SI joint thrust - -         Passive Mobility - Joint Integrity:  Not indicated    LE Screen:  tightness with césar hip flexion, IR, and ER PROM; hx of R MARLEN anterior approach    Treatment Today   Pt arrived 2 min after appt start time as [facility] were [facility]. Completed paperwork and appt was  started at 10:15am  TREATMENT MINUTES COMMENTS   Evaluation 20 -lumbar spine/SI joint   Self-care/ Home management     Manual therapy     Neuromuscular Re-education     Therapeutic Activity     Therapeutic Exercises 20 Current HEP: LTR, active knee to chest (switched to ab set with march)  -see exercise flow sheet  -educated on POC, diagnosis and HEP   Gait training     Modality__________________                Total 40    Blank areas are intentional and mean the treatment did not include these items.     PT Evaluation Code: (Please list factors)  Patient History/Comorbidities: see above  Examination: lumbar spine  Clinical Presentation: stable  Clinical Decision Making: low    Patient History/  Comorbidities Examination  (body structures and functions, activity limitations, and/or participation restrictions) Clinical Presentation Clinical Decision Making (Complexity)   No documented Comorbidities or personal factors 1-2 Elements Stable and/or uncomplicated Low   1-2 documented comorbidities or personal factor 3 Elements Evolving clinical presentation with changing characteristics Moderate   3-4 documented comorbidities or personal factors 4 or more Unstable and unpredictable High                Kristal Vega, PT, DPT  6/26/2017  10:04 AM

## 2021-06-25 NOTE — PROGRESS NOTES
Progress Notes by Miriam Zarate MD at 6/8/2017 11:15 AM     Author: Miriam Zarate MD Service: -- Author Type: Physician    Filed: 6/14/2017  5:13 PM Encounter Date: 6/8/2017 Status: Signed    : Miriam Zarate MD (Physician)         [facility] Radiation Oncology Follow Up Note    Patient: Reggie Gomes  MRN: [id number]  Date of Service: 06/08/2017    Assessment / Impression     1. Prostate cancer        ECOG Peformance Status  ECOG Performance Status: 1  Distress Assessment Score  Distress Assessment Score: 1  Interventions  Distress Assessment Intervention: Provider Notified  Body site: Male Pelvis    Plan:     Prostate cancer: Patient is essentially 5 years status post stereotactic body radiation therapy for prostate cancer.  His PSA is undetectable.  At this time I think he can be followed by his primary physician with yearly PSAs.    Face to face time  15 minutes with > 50% spent on consultation, education and coordination of care.    Subjective:      HPI: Reggie Gomes is a 72 y.o. male with   a Prostate cancer, [place] 6, with a pretreatment PSA of 5.6. He was treated with Stereotactic radiosurgery from 09/17/2012 through 09/26/2012. Reggie received a dose of 3750 cGy in 5 fractions.     Chief Complaint   Patient presents with   ? HE Cancer     f/u appt   .    Current Outpatient Prescriptions   Medication Sig Dispense Refill   ? aspirin 81 mg chewable tablet Chew 81 mg daily.     ? atorvastatin (LIPITOR) 40 MG tablet TAKE 1 TABLET(40 MG) BY MOUTH EVERY MORNING 90 tablet 2   ? CALCIUM CARBONATE (CALCIUM 500 ORAL) Take 1 tablet by mouth daily.     ? cyclobenzaprine (FLEXERIL) 5 MG tablet Take 5 mg by mouth 3 (three) times a day as needed for muscle spasms.     ? cyclobenzaprine (FLEXERIL) 5 MG tablet TAKE 1 TABLET BY MOUTH TWICE DAILY AS NEEDED FOR MUSCLE SPASMS 42 tablet 1   ? gabapentin (NEURONTIN) 300 MG capsule Take 300 mg by mouth bedtime as needed.     ? lisinopril  (PRINIVIL,ZESTRIL) 5 MG tablet TAKE 1 TABLET(5 MG) BY MOUTH DAILY 90 tablet 2   ? LOPERAMIDE HCL (IMODIUM A-D ORAL) Take 1 capsule by mouth daily.     ? naproxen sodium (ALEVE) 220 mg cap Take 1 tablet by mouth every 6 (six) hours as needed.     ? omeprazole (PRILOSEC) 20 MG capsule Take 20 mg by mouth daily.     ? psyllium (METAMUCIL) 0.52 gram capsule Take 0.52 g by mouth daily. 3-4 capsules per day     ? tamsulosin (FLOMAX) 0.4 mg Cp24 TAKE 1 CAPSULE BY MOUTH TWICE DAILY 180 capsule 4   ? traMADol (ULTRAM) 50 mg tablet Take 50 mg by mouth 4 (four) times a day.     ? HYDROcodone-acetaminophen 5-325 mg per tablet Take 1 tablet by mouth every 8 (eight) hours as needed for pain. 30 tablet 0     No current facility-administered medications for this visit.        The following portions of the patient's history were reviewed and updated as appropriate: allergies, current medications, past family history, past medical history, past social history, past surgical history and problem list.    Review of Systems    Constitutional  Constitutional (WDL): All constitutional elements are within defined limits  Neurosensory  Neurosensory (WDL): All neurosensory elements are within defined limits  Eye        Eye Disorder (WDL): All eye disorder elements are within defined limits  Ear     Cardiovascular  Cardiovascular (WDL): All cardiovascular elements are within defined limits  Pulmonary  Respiratory (WDL): Within Defined Limits  Gastrointestinal  Gastrointestinal (WDL): All gastrointestinal elements are within defined limits  Diarrhea: None  Genitourinary  Genitourinary (WDL): Exceptions to WDL  Urinary Frequency: Present (At night.  2-3x/night)  Urinary Retention: None  Urinary Tract Pain: None              Musculoskeletal              Musculoskeletal and Connetive Tissue Disorders (WDL): Exceptions to WDL  Arthralgia: Moderate pain, limiting instrumental ADL (SI joint pain, B shoulder pain)  Integumentary  Integumentary (WDL):  "Exceptions to WDL (Bruise? left forearm.  Black nail on right foot.)  Patient Coping  Patient Coping: Accepting  Pain              Currently in Pain: No/denies  Accompanied by  Accompanied by: Alone    Objective:     Physical Exam    Vitals:    06/08/17 1123   BP: 128/77   Pulse: 83   Temp: 98.7  F (37.1  C)   TempSrc: Oral   SpO2: 96%   Weight: (!) 229 lb 14.4 oz (104.3 kg)   Height: 5' 7\" (1.702 m)        General appearance: alert, appears stated age and cooperative  Head: Normocephalic, without obvious abnormality, atraumatic  Eyes: negative findings: lids and lashes normal, conjunctivae and sclerae normal and corneas clear  Neck: no adenopathy, supple, symmetrical, trachea midline and thyroid not enlarged, symmetric, no tenderness/mass/nodules  Lungs: clear to auscultation bilaterally  Heart: regular rate and rhythm, S1, S2 normal, no murmur, click, rub or gallop  Abdomen: soft, non-tender; bowel sounds normal; no masses,  no organomegaly  Male genitalia: normal  Rectal: Concave prostate bed without masses or nodules, normal sphincter tone, no blood on the examining finger.  Lymph nodes: Cervical, supraclavicular, and axillary nodes normal.  Neurologic: Grossly normal    Recent Labs:   Recent Results (from the past 168 hour(s))   PSA (Prostatic-Specific Antigen), Diagnostic (add-on)   Result Value Ref Range    PSA <0.1 0.0 - 6.5 ng/mL         Imaging: Imaging results 30 days: No results found.    Signed by: Miriam Zarate MD       "

## 2021-06-26 NOTE — ED PROVIDER NOTES
EMERGENCY DEPARTMENT ENCOUnter      NAME: Reggie Gomes  AGE: 76 y.o. male  YOB: 1945  MRN: [id number]  EVALUATION DATE & TIME: 6/20/2021  3:21 AM    PCP: System, Provider Not In    ED PROVIDER: Glenna Woody M.D.      Chief Complaint   Patient presents with     Urinary Tract Infection     Urinary Frequency         FINAL IMPRESSION:  1. Acute cystitis with hematuria          ED COURSE & MEDICAL DECISION MAKING:    Pertinent Labs & Imaging studies reviewed. (See chart for details)    3:30 AM Met with patient for initial interview and exam. Plan for care in the ED was discussed. I saw the patient wearing an N95, surgical mask and cap, eye protection, and gloves.   4:16 AM Updated the patient. We discussed the plan for discharge and the patient is agreeable. Reviewed supportive cares, symptomatic treatment, outpatient follow up, and reasons to return to the Emergency Department. Patient to be discharged by ED RN.     76 y.o. male presents to the Emergency Department for evaluation of dysuria.  The patient's vital signs are normal.  His abdominal exam is benign and denies any flank tenderness or vomiting.  He has not been febrile.  He has been constipated for the last few days.  Had prostate cancer in the past but completed therapy approximately 5 years ago.  He has a history of a few UTIs and this feels similar.  I will get basic labs to evaluate for any a sending infection.  He will get a urine sample to evaluate for UTI.  Abdominal exam is benign so no indication for imaging at this time.    Urine is convincing for urinary tract infection.  This may be the beginning of pyelonephritis however he has no abdominal symptoms, flank pain or vomiting.  He does have a significant number of white blood cells in the urine, so I will treat more aggressively with Bactrim.  He will get his first dose here.  His labs show that his kidney function is normal so no need for admission at this time.    At the  conclusion of the encounter I discussed the results of all of the tests and the disposition. The questions were answered. The patient or family acknowledged understanding and was agreeable with the care plan.  Will be discharged on 10 days of Bactrim and encourage close follow-up if not getting better in the next 2 days.  We also discussed warning signs and indications to return to the emergency department.  He understands these warning signs and will return with any concerns.    MEDICATIONS GIVEN IN THE EMERGENCY:  Medications   sulfamethoxazole-trimethoprim 800-160 mg 1 tablet (SEPTRA DS) (has no administration in time range)       NEW PRESCRIPTIONS STARTED AT TODAY'S ER VISIT  Current Discharge Medication List      CONTINUE these medications which have NOT CHANGED    Details   acetaminophen (TYLENOL) 325 MG tablet Take 1-2 tablets (325-650 mg total) by mouth every 4 (four) hours as needed.  Qty: 100 tablet, Refills: 1    Associated Diagnoses: Primary osteoarthritis of left hip      aspirin 325 MG EC tablet Take 1 tablet (325 mg total) by mouth daily.  Qty: 42 tablet, Refills: 0    Associated Diagnoses: Primary osteoarthritis of left hip      atorvastatin (LIPITOR) 40 MG tablet Take 40 mg by mouth every morning.      CALCIUM CARBONATE (CALCIUM 500 ORAL) Take 1 tablet by mouth daily.      cyclobenzaprine (FLEXERIL) 5 MG tablet Take 5 mg by mouth daily as needed for muscle spasms.      gabapentin (NEURONTIN) 300 MG capsule Take 300 mg by mouth bedtime as needed.      lisinopril (PRINIVIL,ZESTRIL) 5 MG tablet Take 1 tablet (5 mg total) by mouth daily.  Qty: 90 tablet, Refills: 0    Comments: Due to see Dr. Caldwell  Associated Diagnoses: CAD (coronary artery disease)      loperamide (IMODIUM A-D) 2 mg tablet Take 2 mg by mouth daily.      naproxen sodium (ALEVE) 220 mg cap Take 2 tablets by mouth 2 (two) times a day as needed.       omeprazole (PRILOSEC) 20 MG capsule Take 20 mg by mouth daily.      oxyCODONE  (ROXICODONE) 5 MG immediate release tablet Take 1-2 tablets (5-10 mg total) by mouth every 4 (four) hours as needed.  Qty: 60 tablet, Refills: 0    Associated Diagnoses: Primary osteoarthritis of left hip      psyllium (METAMUCIL) 0.52 gram capsule Take 2.08 g by mouth daily. 4 capsules per day      senna-docusate (PERICOLACE) 8.6-50 mg tablet Take 1-2 tablets by mouth 2 (two) times a day.  Qty: 60 tablet, Refills: 1    Associated Diagnoses: Primary osteoarthritis of left hip      tamsulosin (FLOMAX) 0.4 mg Cp24 Take 1 capsule (0.4 mg total) by mouth 2 (two) times a day.  Qty: 180 capsule, Refills: 4    Associated Diagnoses: Prostate cancer (H)                =================================================================    HPI    Patient information was obtained from: Patient    Use of Intrepreter: N/A         Reggie Gomes is a 76 y.o. male who presents for evaluation of urinary frequency.     Patient reports increased urinary frequency which onset tonight. He states that he is having to urinate about every 10 minutes, but feels like he is emptying his bladder after each urination. He endorses dysuria, described as a burning sensation for the past few days. He denies any abdominal pain, vomiting, hematuria, fever, or diarrhea. Patient is not on any anticoagulants. He takes lisinopril and gabapentin. He has a history of prostate cancer, for which he was treated 5 years ago.       REVIEW OF SYSTEMS   Review of Systems   Constitutional: Negative for fever.   Gastrointestinal: Negative for abdominal pain, diarrhea and vomiting.   Genitourinary: Positive for dysuria and frequency. Negative for hematuria.   All other systems reviewed and are negative.       PAST MEDICAL HISTORY:  Past Medical History:   Diagnosis Date     Arthritis     osteo     BPH (benign prostatic hyperplasia)      Cancer (H)     prostate and bladder     Coronary artery disease      GERD (gastroesophageal reflux disease)      Hydrocele       Hyperlipidemia      Hypertension      Lumbar spinal stenosis      Myocardial infarction 2004     Prostate cancer (H)        PAST SURGICAL HISTORY:  Past Surgical History:   Procedure Laterality Date     APPENDECTOMY       BOWEL RESECTION      with colostomy and subsequent takedown of colostomy     COLON SURGERY       CORONARY ANGIOPLASTY  2004    1 stent     HERNIA REPAIR       LUMBAR SPINE SURGERY      X2     ORCHIECTOMY Right 10/27/2015    Procedure: SIMPLE RIGHT ORCHIECTOMY;  Surgeon: Mario Alberto Thompson MD;  Location: [facility] [place];  Service:      NE TOTAL HIP ARTHROPLASTY Left 5/11/2018    Procedure: LEFT, ARTHROPLASTY, TOTAL, TOTAL DIRECT ANTERIOR APPROACH;  Surgeon: Manuel Yap MD;  Location: [facility] OR;  Service: Orthopedics     PROSTATE SURGERY       REVISION TOTAL HIP ARTHROPLASTY Right 2014     SMALL INTESTINE SURGERY       SPINE SURGERY       TOTAL HIP ARTHROPLASTY Right 5/2014     VASECTOMY             CURRENT MEDICATIONS:    No current facility-administered medications on file prior to encounter.      Current Outpatient Medications on File Prior to Encounter   Medication Sig     acetaminophen (TYLENOL) 325 MG tablet Take 1-2 tablets (325-650 mg total) by mouth every 4 (four) hours as needed.     aspirin 325 MG EC tablet Take 1 tablet (325 mg total) by mouth daily.     atorvastatin (LIPITOR) 40 MG tablet Take 40 mg by mouth every morning.     CALCIUM CARBONATE (CALCIUM 500 ORAL) Take 1 tablet by mouth daily.     cyclobenzaprine (FLEXERIL) 5 MG tablet Take 5 mg by mouth daily as needed for muscle spasms.     gabapentin (NEURONTIN) 300 MG capsule Take 300 mg by mouth bedtime as needed.     lisinopril (PRINIVIL,ZESTRIL) 5 MG tablet Take 1 tablet (5 mg total) by mouth daily.     loperamide (IMODIUM A-D) 2 mg tablet Take 2 mg by mouth daily.     naproxen sodium (ALEVE) 220 mg cap Take 2 tablets by mouth 2 (two) times a day as needed.      omeprazole (PRILOSEC) 20 MG capsule Take 20 mg  by mouth daily.     oxyCODONE (ROXICODONE) 5 MG immediate release tablet Take 1-2 tablets (5-10 mg total) by mouth every 4 (four) hours as needed.     psyllium (METAMUCIL) 0.52 gram capsule Take 2.08 g by mouth daily. 4 capsules per day     senna-docusate (PERICOLACE) 8.6-50 mg tablet Take 1-2 tablets by mouth 2 (two) times a day.     tamsulosin (FLOMAX) 0.4 mg Cp24 Take 1 capsule (0.4 mg total) by mouth 2 (two) times a day. (Patient taking differently: Take 0.4 mg by mouth Daily after breakfast. )       ALLERGIES:  No Known Allergies    FAMILY HISTORY:  Family History   Problem Relation Age of Onset     Hypertension Father      Heart attack Mother      Anesthesia problems Neg Hx        SOCIAL HISTORY:   Social History     Socioeconomic History     Marital status:      Spouse name: None     Number of children: None     Years of education: None     Highest education level: None   Occupational History     None   Social Needs     Financial resource strain: None     Food insecurity     Worry: None     Inability: None     Transportation needs     Medical: None     Non-medical: None   Tobacco Use     Smoking status: Former Smoker     Quit date: 10/26/2005     Years since quitting: 15.6     Smokeless tobacco: Never Used   Substance and Sexual Activity     Alcohol use: Yes     Alcohol/week: 1.0 standard drinks     Types: 1 Glasses of wine per week     Comment: daily     Drug use: Yes     Types: Hydrocodone     Comment: takes 1 tablet 4 times daily     Sexual activity: None   Lifestyle     Physical activity     Days per week: None     Minutes per session: None     Stress: None   Relationships     Social connections     Talks on phone: None     Gets together: None     Attends Hinduism service: None     Active member of club or organization: None     Attends meetings of clubs or organizations: None     Relationship status: None     Intimate partner violence     Fear of current or ex partner: None     Emotionally  "abused: None     Physically abused: None     Forced sexual activity: None   Other Topics Concern     None   Social History Narrative    ** Merged History Encounter **            VITALS:  Patient Vitals for the past 24 hrs:   BP Temp Temp src Pulse Resp SpO2 Height Weight   06/20/21 0325 124/84 98.3  F (36.8  C) Oral 80 16 99 % 5' 6\" (1.676 m) 215 lb (97.5 kg)       PHYSICAL EXAM    Constitutional:  Well developed, well nourished, no acute distress   EYES: Conjunctivae clear  HENT:  Atraumatic, normocephalic Bilateral external ears normal, nose normal, oropharynx moist. Neck- supple   Respiratory:  No respiratory distress, normal breath sounds, no rales, no wheezing, no cough, no stridor   Cardiovascular:  Normal rate, normal rhythm, no murmurs, capillary refill normal.  No chest wall tenderness  GI: Obese. Well healed midline scar. Soft, nondistended, nontender, no palpable masses, no rebound, no guarding   : No CVA tenderness.    Musculoskeletal:  No edema.  No cyanosis.  Range of motion major extremities intact. No tenderness to palpation or major deformities noted.    Integument: Warm, Dry, No erythema, No rash.   Neurologic:  Alert & oriented, no focal deficits noted, ambulatory  Psych: Affect normal, Mood normal.     LAB:  All pertinent labs reviewed and interpreted.  Results for orders placed or performed during the hospital encounter of 06/20/21   Urinalysis-UC if Indicated   Result Value Ref Range    Color, UA Yellow Light Yellow, Yellow    Clarity, UA Excessively Turbid (!) Clear    Glucose, UA Negative Negative    Protein,  mg/dL (!) Negative    Bilirubin, UA Negative Negative    Urobilinogen, UA <2.0 mg/dL <2.0 mg/dL    pH, UA 5.5 5.0 - 8.0    Blood, UA >1.0 mg/dL (!) Negative    Ketones, UA Negative Negative    Nitrite, UA Positive (!) Negative    Leukocytes,  Aaliyah/uL (!) Negative    Specific Gravity, UA 1.024 1.001 - 1.030    RBC, UA >182 (H) <=2 hpf    WBC UA >182 (H) <=5 hpf    Squamous " Epithel, UA 0 <=5 /HPF    Mucus, UA Present (!) None Seen lpf   Basic Metabolic Panel   Result Value Ref Range    Sodium 143 136 - 145 mmol/L    Potassium 4.3 3.5 - 5.0 mmol/L    Chloride 109 (H) 98 - 107 mmol/L    CO2 26 22 - 31 mmol/L    Anion Gap, Calculation 8 5 - 18 mmol/L    Glucose 111 70 - 125 mg/dL    Calcium 8.7 8.5 - 10.5 mg/dL    BUN 13 8 - 28 mg/dL    Creatinine 0.81 0.70 - 1.30 mg/dL    GFR MDRD Af Amer >60 >60 mL/min/1.73m2    GFR MDRD Non Af Amer >60 >60 mL/min/1.73m2         I, Raquel Ron, am serving as a scribe to document services personally performed by Dr. Annalise MD based on my observation and the provider's statements to me. I, Dr. Annalise MD attest that Raquel Rno is acting in a scribe capacity, has observed my performance of the services and has documented them in accordance with my direction.    Glenna Woody M.D.  Emergency Medicine  [facility] EMERGENCY DEPARTMENT  [address]  [facility] [address]  Dept: [phone]  Loc: [phone]           Glenna Woody MD  06/20/21 0422

## 2021-06-26 NOTE — PROGRESS NOTES
Progress Notes by Shamar Caldwell MD at 3/19/2018 10:30 AM     Author: Shamar Caldwell MD Service: -- Author Type: Physician    Filed: 3/19/2018 10:57 AM Encounter Date: 3/19/2018 Status: Signed    : Shamar Caldwell MD (Physician)                  [email or web address].[email or web address]/Heart  [phone]         Thank you Dr. Bone for asking the [facility] Heart Care team to participate in the care of your patient, Reggie Gomes.     Impression and Plan     1. Coronary artery disease.  Reggie has known coronary disease. He had suffered an anterior myocardial infarction in July 2004 at which time he had angioplasty and stenting of the LAD. Historically, he has had well-preserved LV systolic performance.     This remains stable. Patient reports no concerning symptoms in this regard.     2. Hypertension. Blood pressure is reasonable in office today at 124/83 mmHg.     3. Dyslipidemia.  Recent lipid profile was fairly close to target.    I will plan on seeing Reggie again in 1 year.           History of Present Illness    Once again I would like to thank you again for asking me to participate in the care of your patient, Reggie Gomes.  As you know, but to reiterate for my own records, Reggie Gomes is a 72 y.o. male with known coronary disease. He had suffered an anterior myocardial infarction in July 2004 at which time he had angioplasty and stenting of the LAD. Historically, he has had well-preserved LV systolic performance.     On interview, Mario states that he has been doing quite well. He reports no chest pain, shortness of breath, or diminished exercise tolerance. He reports no palpitations or lightheadedness.     Further review of systems is otherwise negative/noncontributory (medical record and 13 point review of systems reviewed as well and pertinent positives noted).         Cardiac Diagnostics      Echocardiogram 16 August 2004:   1. Normal LV size and function with ejection fraction of  "55-60%.   2. Distal septal hypokinesis.   3. No significant valvular heart disease.  4. Mild left atrial enlargement.         Physical Examination       /83 (Patient Site: Right Leg, Patient Position: Sitting, Cuff Size: Adult Regular)  Pulse 66  Resp 16  Ht 5' 8\" (1.727 m)  Wt (!) 227 lb (103 kg)  BMI 34.52 kg/m2        Wt Readings from Last 3 Encounters:   03/19/18 (!) 227 lb (103 kg)   03/12/18 220 lb (99.8 kg)   02/16/18 220 lb (99.8 kg)       The patient is alert and oriented times three. Sclerae are anicteric. Mucosal membranes are moist. Jugular venous pressure is normal. No significant adenopathy/thyromegally appreciated. Lungs are clear with good expansion. On cardiovascular exam, the patient has a regular S1 and S2. Abdomen is soft and non-tender. Extremities reveal no clubbing, cyanosis, or edema.         Family History/Social History/Risk Factors   Patient does not smoke.  Family history of hypertension.         Medications  Allergies   Current Outpatient Prescriptions   Medication Sig Dispense Refill   ? atorvastatin (LIPITOR) 40 MG tablet TAKE 1 TABLET(40 MG) BY MOUTH EVERY MORNING 90 tablet 0   ? CALCIUM CARBONATE (CALCIUM 500 ORAL) Take 1 tablet by mouth daily.     ? cyclobenzaprine (FLEXERIL) 5 MG tablet TAKE 1 TABLET(5 MG) BY MOUTH DAILY AS NEEDED FOR MUSCLE SPASMS 14 tablet 0   ? gabapentin (NEURONTIN) 300 MG capsule Take 300 mg by mouth bedtime as needed.     ? lisinopril (PRINIVIL,ZESTRIL) 5 MG tablet Take 1 tablet (5 mg total) by mouth daily. 90 tablet 0   ? LOPERAMIDE HCL (IMODIUM A-D ORAL) Take 1 capsule by mouth daily.     ? naproxen sodium (ALEVE) 220 mg cap Take 1 tablet by mouth every 6 (six) hours as needed.     ? omeprazole (PRILOSEC) 20 MG capsule Take 20 mg by mouth daily.     ? psyllium (METAMUCIL) 0.52 gram capsule Take 0.52 g by mouth daily. 3-4 capsules per day     ? tamsulosin (FLOMAX) 0.4 mg Cp24 Take 1 capsule (0.4 mg total) by mouth 2 (two) times a day. 180 capsule " 4   ? traMADol (ULTRAM) 50 mg tablet Take 50 mg by mouth 4 (four) times a day.     ? aspirin (ENTERIC COATED ASPIRIN) 81 MG EC tablet Take 1 tablet (81 mg total) by mouth daily.  0     No current facility-administered medications for this visit.       No Known Allergies       Lab Results   Lab Results   Component Value Date     03/09/2018    K 3.9 03/09/2018     03/09/2018    CO2 30 03/09/2018    BUN 19 03/09/2018    CREATININE 0.80 03/09/2018    CALCIUM 9.7 03/09/2018     Lab Results   Component Value Date    WBC 4.0 05/02/2014    HGB 12.6 (L) 09/23/2014    HCT 36.5 (L) 05/02/2014    MCV 93 05/02/2014     05/02/2014     Lab Results   Component Value Date    CHOL 158 03/09/2018    TRIG 92 03/09/2018    HDL 60 03/09/2018    LDLCALC 80 03/09/2018     Lab Results   Component Value Date    INR 1.04 05/02/2014

## 2021-07-03 NOTE — ADDENDUM NOTE
Addendum Note by Coty Martinez RN at 1/11/2018  1:57 PM     Author: Coty Martinez RN Service: -- Author Type: Registered Nurse    Filed: 1/11/2018  1:57 PM Date of Service: 1/11/2018  1:57 PM Status: Signed    : Coty Martinez RN (Registered Nurse)    Encounter addended by: Coty Martinez RN on: 1/11/2018  1:57 PM<BR>     Actions taken: Vitals modified

## 2021-07-03 NOTE — ADDENDUM NOTE
Addendum Note by Yonny Espinoza MD at 11/6/2017  9:48 AM     Author: Yonny Espinoza MD Service: -- Author Type: Physician    Filed: 11/6/2017  9:48 AM Date of Service: 11/6/2017  9:48 AM Status: Signed    : Yonny Espinoza MD (Physician)    Encounter addended by: Yonny Espinoza MD on: 11/6/2017  9:48 AM<BR>     Actions taken: LOS modified

## 2021-07-03 NOTE — ADDENDUM NOTE
Addendum Note by Cortney Masterson CMA at 11/6/2017 11:59 PM     Author: Cortney Masterson CMA Service: -- Author Type: Certified Medical Assistant    Filed: 11/22/2017  9:52 AM Date of Service: 11/6/2017 11:59 PM Status: Signed    : Cortney Masterson CMA (Certified Medical Assistant)    Encounter addended by: Cortney Masterson CMA on: 11/22/2017  9:52 AM<BR>     Actions taken: Charge Capture section accepted

## 2021-07-06 VITALS — BODY MASS INDEX: 34.55 KG/M2 | WEIGHT: 215 LBS | HEIGHT: 66 IN

## 2021-08-21 ENCOUNTER — HEALTH MAINTENANCE LETTER (OUTPATIENT)
Age: 76
End: 2021-08-21

## 2021-10-11 ENCOUNTER — APPOINTMENT (OUTPATIENT)
Dept: RADIOLOGY | Facility: HOSPITAL | Age: 76
DRG: 872 | End: 2021-10-11
Attending: EMERGENCY MEDICINE
Payer: MEDICARE

## 2021-10-11 ENCOUNTER — HOSPITAL ENCOUNTER (INPATIENT)
Facility: HOSPITAL | Age: 76
LOS: 3 days | Discharge: HOME | DRG: 872 | End: 2021-10-14
Attending: EMERGENCY MEDICINE | Admitting: HOSPITALIST
Payer: MEDICARE

## 2021-10-11 DIAGNOSIS — M75.122 NONTRAUMATIC COMPLETE TEAR OF LEFT ROTATOR CUFF: Primary | ICD-10-CM

## 2021-10-11 DIAGNOSIS — L03.114 CELLULITIS OF LEFT UPPER EXTREMITY: ICD-10-CM

## 2021-10-11 PROBLEM — M54.50 ACUTE BILATERAL LOW BACK PAIN WITHOUT SCIATICA: Status: ACTIVE | Noted: 2020-02-17

## 2021-10-11 PROBLEM — Z85.51 HISTORY OF BLADDER CANCER: Status: ACTIVE | Noted: 2018-09-21

## 2021-10-11 PROBLEM — Z85.46 HISTORY OF PROSTATE CANCER: Status: ACTIVE | Noted: 2018-09-21

## 2021-10-11 PROBLEM — M19.019 ACROMIOCLAVICULAR JOINT ARTHRITIS: Status: ACTIVE | Noted: 2017-02-15

## 2021-10-11 PROBLEM — M75.120 COMPLETE TEAR OF ROTATOR CUFF: Status: ACTIVE | Noted: 2017-02-15

## 2021-10-11 PROBLEM — R05.9 COUGH: Status: ACTIVE | Noted: 2021-10-11

## 2021-10-11 PROBLEM — E78.5 DYSLIPIDEMIA: Status: ACTIVE | Noted: 2019-03-12

## 2021-10-11 PROBLEM — Z95.5 H/O HEART ARTERY STENT: Status: ACTIVE | Noted: 2017-02-10

## 2021-10-11 PROBLEM — M48.02 CERVICAL STENOSIS OF SPINE: Status: ACTIVE | Noted: 2018-07-23

## 2021-10-11 PROBLEM — R26.89 ABNORMALITY OF GAIT DUE TO IMPAIRMENT OF BALANCE: Status: ACTIVE | Noted: 2018-07-23

## 2021-10-11 LAB
ANION GAP SERPL CALCULATED.3IONS-SCNC: 10 MMOL/L (ref 5–18)
BUN SERPL-MCNC: 12 MG/DL (ref 8–28)
CALCIUM SERPL-MCNC: 8.9 MG/DL (ref 8.5–10.5)
CHLORIDE BLD-SCNC: 105 MMOL/L (ref 98–107)
CK SERPL-CCNC: 155 U/L (ref 30–190)
CO2 SERPL-SCNC: 23 MMOL/L (ref 22–31)
CREAT SERPL-MCNC: 0.89 MG/DL (ref 0.7–1.3)
ERYTHROCYTE [DISTWIDTH] IN BLOOD BY AUTOMATED COUNT: 13.2 % (ref 10–15)
GFR SERPL CREATININE-BSD FRML MDRD: 84 ML/MIN/1.73M2
GLUCOSE BLD-MCNC: 125 MG/DL (ref 70–125)
HCT VFR BLD AUTO: 39 % (ref 40–53)
HGB BLD-MCNC: 12.8 G/DL (ref 13.3–17.7)
HOLD SPECIMEN: NORMAL
INR PPP: 1.16 (ref 0.9–1.15)
LACTATE SERPL-SCNC: 1.2 MMOL/L (ref 0.7–2)
LIPASE SERPL-CCNC: <9 U/L (ref 0–52)
MCH RBC QN AUTO: 34.1 PG (ref 26.5–33)
MCHC RBC AUTO-ENTMCNC: 32.8 G/DL (ref 31.5–36.5)
MCV RBC AUTO: 104 FL (ref 78–100)
PLATELET # BLD AUTO: 133 10E3/UL (ref 150–450)
POTASSIUM BLD-SCNC: 3.7 MMOL/L (ref 3.5–5)
PROCALCITONIN SERPL-MCNC: 0.08 NG/ML (ref 0–0.49)
RBC # BLD AUTO: 3.75 10E6/UL (ref 4.4–5.9)
SARS-COV-2 RNA RESP QL NAA+PROBE: NEGATIVE
SODIUM SERPL-SCNC: 138 MMOL/L (ref 136–145)
WBC # BLD AUTO: 11.2 10E3/UL (ref 4–11)

## 2021-10-11 PROCEDURE — 83605 ASSAY OF LACTIC ACID: CPT | Performed by: EMERGENCY MEDICINE

## 2021-10-11 PROCEDURE — 83690 ASSAY OF LIPASE: CPT | Performed by: EMERGENCY MEDICINE

## 2021-10-11 PROCEDURE — 87040 BLOOD CULTURE FOR BACTERIA: CPT | Performed by: EMERGENCY MEDICINE

## 2021-10-11 PROCEDURE — C9803 HOPD COVID-19 SPEC COLLECT: HCPCS

## 2021-10-11 PROCEDURE — 258N000003 HC RX IP 258 OP 636: Performed by: EMERGENCY MEDICINE

## 2021-10-11 PROCEDURE — 82550 ASSAY OF CK (CPK): CPT | Performed by: HOSPITALIST

## 2021-10-11 PROCEDURE — 96375 TX/PRO/DX INJ NEW DRUG ADDON: CPT

## 2021-10-11 PROCEDURE — 120N000001 HC R&B MED SURG/OB

## 2021-10-11 PROCEDURE — 84145 PROCALCITONIN (PCT): CPT | Performed by: EMERGENCY MEDICINE

## 2021-10-11 PROCEDURE — 250N000013 HC RX MED GY IP 250 OP 250 PS 637: Performed by: HOSPITALIST

## 2021-10-11 PROCEDURE — 87635 SARS-COV-2 COVID-19 AMP PRB: CPT | Performed by: EMERGENCY MEDICINE

## 2021-10-11 PROCEDURE — 85610 PROTHROMBIN TIME: CPT | Performed by: EMERGENCY MEDICINE

## 2021-10-11 PROCEDURE — 250N000013 HC RX MED GY IP 250 OP 250 PS 637: Performed by: EMERGENCY MEDICINE

## 2021-10-11 PROCEDURE — 85027 COMPLETE CBC AUTOMATED: CPT | Performed by: EMERGENCY MEDICINE

## 2021-10-11 PROCEDURE — 99285 EMERGENCY DEPT VISIT HI MDM: CPT | Mod: 25

## 2021-10-11 PROCEDURE — 96361 HYDRATE IV INFUSION ADD-ON: CPT

## 2021-10-11 PROCEDURE — 96365 THER/PROPH/DIAG IV INF INIT: CPT

## 2021-10-11 PROCEDURE — 250N000011 HC RX IP 250 OP 636: Performed by: EMERGENCY MEDICINE

## 2021-10-11 PROCEDURE — 80048 BASIC METABOLIC PNL TOTAL CA: CPT | Performed by: EMERGENCY MEDICINE

## 2021-10-11 PROCEDURE — 36415 COLL VENOUS BLD VENIPUNCTURE: CPT | Performed by: STUDENT IN AN ORGANIZED HEALTH CARE EDUCATION/TRAINING PROGRAM

## 2021-10-11 PROCEDURE — 83605 ASSAY OF LACTIC ACID: CPT | Performed by: STUDENT IN AN ORGANIZED HEALTH CARE EDUCATION/TRAINING PROGRAM

## 2021-10-11 PROCEDURE — 99223 1ST HOSP IP/OBS HIGH 75: CPT | Performed by: HOSPITALIST

## 2021-10-11 PROCEDURE — 71045 X-RAY EXAM CHEST 1 VIEW: CPT

## 2021-10-11 RX ORDER — AZITHROMYCIN 250 MG/1
500 TABLET, FILM COATED ORAL ONCE
Status: COMPLETED | OUTPATIENT
Start: 2021-10-11 | End: 2021-10-11

## 2021-10-11 RX ORDER — PIPERACILLIN SODIUM, TAZOBACTAM SODIUM 3; .375 G/15ML; G/15ML
3.38 INJECTION, POWDER, LYOPHILIZED, FOR SOLUTION INTRAVENOUS ONCE
Status: COMPLETED | OUTPATIENT
Start: 2021-10-11 | End: 2021-10-11

## 2021-10-11 RX ORDER — ATENOLOL 25 MG/1
25 TABLET ORAL DAILY
COMMUNITY

## 2021-10-11 RX ORDER — CEFAZOLIN SODIUM 1 G/50ML
1750 SOLUTION INTRAVENOUS ONCE
Status: COMPLETED | OUTPATIENT
Start: 2021-10-11 | End: 2021-10-12

## 2021-10-11 RX ORDER — CEFTRIAXONE 1 G/1
1 INJECTION, POWDER, FOR SOLUTION INTRAMUSCULAR; INTRAVENOUS ONCE
Status: DISCONTINUED | OUTPATIENT
Start: 2021-10-11 | End: 2021-10-11

## 2021-10-11 RX ORDER — ACETAMINOPHEN 500 MG
1000 TABLET ORAL EVERY 6 HOURS PRN
Status: ON HOLD | COMMUNITY
End: 2021-10-14

## 2021-10-11 RX ORDER — ACETAMINOPHEN 325 MG/1
650 TABLET ORAL ONCE
Status: COMPLETED | OUTPATIENT
Start: 2021-10-11 | End: 2021-10-11

## 2021-10-11 RX ORDER — DULOXETIN HYDROCHLORIDE 60 MG/1
60 CAPSULE, DELAYED RELEASE ORAL 2 TIMES DAILY
COMMUNITY

## 2021-10-11 RX ORDER — SODIUM CHLORIDE 9 MG/ML
INJECTION, SOLUTION INTRAVENOUS CONTINUOUS
Status: DISCONTINUED | OUTPATIENT
Start: 2021-10-11 | End: 2021-10-11

## 2021-10-11 RX ORDER — ASPIRIN 81 MG/1
81 TABLET, CHEWABLE ORAL DAILY
COMMUNITY

## 2021-10-11 RX ORDER — AZITHROMYCIN 250 MG/1
250 TABLET, FILM COATED ORAL DAILY
Status: DISCONTINUED | OUTPATIENT
Start: 2021-10-12 | End: 2021-10-12

## 2021-10-11 RX ADMIN — ACETAMINOPHEN 650 MG: 325 TABLET ORAL at 19:32

## 2021-10-11 RX ADMIN — SODIUM CHLORIDE 1000 ML: 9 INJECTION, SOLUTION INTRAVENOUS at 19:07

## 2021-10-11 RX ADMIN — VANCOMYCIN HYDROCHLORIDE 1750 MG: 5 INJECTION, POWDER, LYOPHILIZED, FOR SOLUTION INTRAVENOUS at 22:26

## 2021-10-11 RX ADMIN — PIPERACILLIN SODIUM AND TAZOBACTAM SODIUM 3.38 G: 3; .375 INJECTION, POWDER, LYOPHILIZED, FOR SOLUTION INTRAVENOUS at 19:08

## 2021-10-11 RX ADMIN — AZITHROMYCIN MONOHYDRATE 500 MG: 250 TABLET ORAL at 22:17

## 2021-10-11 ASSESSMENT — MIFFLIN-ST. JEOR: SCORE: 1675.66

## 2021-10-11 NOTE — ED TRIAGE NOTES
Pt arrives with a 2 day history of fever, general weakness, headache, and neck stiffness.  Pt states yesterday he had an episode of urine incontinence which is not normal for him.  Pt denies any exposure to anyone who is bry.  Pt has a rash to his L arm.

## 2021-10-11 NOTE — ED PROVIDER NOTES
EMERGENCY DEPARTMENT ENCOUNTER            IMPRESSION:  Cellulitis      MEDICAL DECISION MAKING:  Patient brought in by ambulance complaining of weakness and fever.  His left arm has become red and swollen and painful over the last several days.  Today he was too weak to upright himself.  He has had no respiratory or urinary or ENT symptoms.    On exam he does not appear to ill.  Blood pressure stable.  He has a low-grade temperature.  He is tachypneic.  ENT and cardiopulmonary exam are normal.  Left upper extremity from the elbow distally to the dorsum of the hand there is a deep erythematous tender cellulitis.    Sepsis orders were initiated, antibiotics were ordered, culture collected    CBC shows slightly elevated white blood cell count    Chemistry is normal.  Lactate is normal    Chest x-ray does not show pneumonia    Patient will be admitted for treatment of sepsis from cellulitis.  Patient is too weak to return home safely.      =================================================================  CHIEF COMPLAINT:  Chief Complaint   Patient presents with     Extremity Weakness     Fever     Headache         HPI  Reggie Gomes is a 76 year old male with a history of osteoarthritis, acromioclavicular joint arthritis, chronic bilateral low back pain, prostate cancer, bladder cancer, dyslipidemia, CAD, who presents to the ED by EMS from home for evaluation of extremity weakness and fever.    Patient reports feeling unwell until early afternoon today. He states that he went out for lunch and took a nap afterwards. Patient notes that after he woke up, he felt ill and noticed weakness, especially in lower extremities. He also notes that three days ago, he developed redness and swelling to left forearm. Patient reports subjective fever and pain in muscles in neck and back. Patient presents to ED from home after son notified EMS. No other complaints at this time.     IKristal am serving as a scribe to document  "services personally performed by Dr. Álvaro Gresham MD, based on my observation and the provider's statements to me. I, Dr. Álvaro Gresham MD attest that Kristal Kerns is acting in a scribe capacity, has observed my performance of the services and has documented them in accordance with my direction.    REVIEW OF SYSTEMS   Constitutional: Denies chills, unintentional weight loss or fatigue. Positive for fever (subjective).   Eyes: Denies visual changes or discharge    HENT: Denies sore throat, ear pain or neck pain  Respiratory: Denies cough or shortness of breath    Cardiovascular: Denies chest pain, palpitations or leg swelling  GI: Denies abdominal pain, nausea, vomiting, or dark, bloody stools.    : Denies hematuria, dysuria, or flank pain  Musculoskeletal: Positive for pain in \"neck and back muscles.\"  Skin: Denies rash or wound. Positive for redness and swelling to left forearm.  Neurologic: Denies current headache, focal weakness, or sensory changes. Positive for weakness in lower extremities.  Lymphatic: Denies swollen glands    Psychiatric: Denies depression, suicidal ideation or homicidal ideation.    Remainder of systems reviewed, unless noted in HPI all others negative.      PAST MEDICAL HISTORY:  Past Medical History:   Diagnosis Date     Arthritis     osteo     BPH (benign prostatic hyperplasia)      Cancer (H)     prostate and bladder     Coronary artery disease      GERD (gastroesophageal reflux disease)      Hydrocele      Hyperlipidemia      Hypertension      Lumbar spinal stenosis      Myocardial infarction (H) 2004     Prostate cancer (H)        PAST SURGICAL HISTORY:  Past Surgical History:   Procedure Laterality Date     APPENDECTOMY       ARTHROPLASTY REVISION HIP Right 2014     BOWEL RESECTION      with colostomy and subsequent takedown of colostomy     C TOTAL HIP ARTHROPLASTY Left 5/11/2018    Procedure: LEFT, ARTHROPLASTY, TOTAL, TOTAL DIRECT ANTERIOR APPROACH;  Surgeon: Manuel Yap MD;  " Location: [facility] Main OR;  Service: Orthopedics     COLON SURGERY       CORONARY ANGIOPLASTY  2004    1 stent     HERNIA REPAIR       LUMBAR SPINE SURGERY      X2     ORCHIECTOMY SCROTAL Right 10/27/2015    Procedure: SIMPLE RIGHT ORCHIECTOMY;  Surgeon: Mario Alberto Thompson MD;  Location: [facility] Main OR;  Service:      PROSTATE SURGERY       SMALL INTESTINE SURGERY       SPINE SURGERY       TOTAL HIP ARTHROPLASTY Right 5/2014     VASECTOMY           CURRENT MEDICATIONS:    acetaminophen (TYLENOL) 325 MG tablet  aspirin 325 MG EC tablet  atorvastatin (LIPITOR) 40 MG tablet  CALCIUM CARBONATE (CALCIUM 500 ORAL)  cyclobenzaprine (FLEXERIL) 5 MG tablet  gabapentin (NEURONTIN) 300 MG capsule  lisinopril (PRINIVIL,ZESTRIL) 5 MG tablet  loperamide (IMODIUM A-D) 2 mg tablet  naproxen sodium (ALEVE) 220 mg cap  omeprazole (PRILOSEC) 20 MG capsule  oxyCODONE (ROXICODONE) 5 MG immediate release tablet  psyllium (METAMUCIL) 0.52 gram capsule  senna-docusate (PERICOLACE) 8.6-50 mg tablet  tamsulosin (FLOMAX) 0.4 mg Cp24        ALLERGIES:  No Known Allergies    FAMILY HISTORY:  Family History   Problem Relation Age of Onset     Hypertension Father      Coronary Artery Disease Mother      Anesthesia Reaction No family hx of        SOCIAL HISTORY:   Social History     Socioeconomic History     Marital status:      Spouse name: Not on file     Number of children: Not on file     Years of education: Not on file     Highest education level: Not on file   Occupational History     Not on file   Tobacco Use     Smoking status: Former Smoker     Quit date: 10/26/2005     Years since quitting: 15.9     Smokeless tobacco: Never Used   Substance and Sexual Activity     Alcohol use: Yes     Alcohol/week: 1.0 standard drinks     Comment: Alcoholic Drinks/day: daily     Drug use: Yes     Types: Hydrocodone     Comment: Drug use: takes 1 tablet 4 times daily     Sexual activity: Not on file   Other Topics Concern     Not on file    Social History Narrative    ** Merged History Encounter **       Social Determinants of Health     Financial Resource Strain:      Difficulty of Paying Living Expenses:    Food Insecurity:      Worried About Running Out of Food in the Last Year:      Ran Out of Food in the Last Year:    Transportation Needs:      Lack of Transportation (Medical):      Lack of Transportation (Non-Medical):    Physical Activity:      Days of Exercise per Week:      Minutes of Exercise per Session:    Stress:      Feeling of Stress :    Social Connections:      Frequency of Communication with Friends and Family:      Frequency of Social Gatherings with Friends and Family:      Attends Taoism Services:      Active Member of Clubs or Organizations:      Attends Club or Organization Meetings:      Marital Status:    Intimate Partner Violence:      Fear of Current or Ex-Partner:      Emotionally Abused:      Physically Abused:      Sexually Abused:        PHYSICAL EXAM:    /63   Pulse 89   Temp (!) 100.8  F (38.2  C) (Oral)   Resp (!) 33   Wt 98 kg (216 lb)   SpO2 94%   BMI 34.86 kg/m      Constitutional: Awake, alert, in no acute distress  Head: Normocephalic, atraumatic.  ENT: Mucous membranes moist. Posterior oropharynx appears normal.  Eyes: Pupils midrange and reactive ,no conjunctival discharge  Neck: No lymphadenopathy, no stridor, supple, soft tissue swelling  Respiratory: Respirations even, unlabored. Lungs clear to ascultation bilaterally, in no acute respiratory distress.  Cardiovascular: Regular rate and rhythm.+2 radial pulses, equal bilaterally.  No murmurs.   GI: Abdomen soft, non-tender to palpation in all 4 quadrants. No guarding or rebound. Bowel sounds intact on all 4 quadrants.   Back: No CVA tenderness.    Musculoskeletal: Left upper extremity deep erythematous and tender over the dorsal surface of the forearm to the dorsum of the hand.  Integument: Warm, dry. No rash. No bruising or  petechiae.  Lymphatic: No cervical lymphadenopathy  Neurologic: Alert & oriented x 3. Normal speech. Grossly normal motor and sensory function. No focal deficits noted.  Psychiatric: Normal mood and affect. Normal judgement.    ED COURSE:    6:26 PM I met with the patient, obtained history, performed an initial exam, and discussed options and plan for diagnostics and treatment here in the ED.  8:00 PM I updated patient.   8:32 PM I spoke to Dr. Mcpherson, hospitalist.     LAB:  All pertinent labs reviewed and interpreted.  Results for orders placed or performed during the hospital encounter of 10/11/21   XR Chest Port 1 View    Impression    IMPRESSION: Heart size within normal limits for portable technique. Pulmonary vascularity normal. Elevated right hemidiaphragm and small granuloma right base. Lungs otherwise clear.   Extra Blood Culture Bottle   Result Value Ref Range    Hold Specimen JIC    Extra Blue Top Tube   Result Value Ref Range    Hold Specimen JIC    Extra Red Top Tube   Result Value Ref Range    Hold Specimen JIC    Extra Green Top (Lithium Heparin) Tube   Result Value Ref Range    Hold Specimen JIC    Extra Purple Top Tube   Result Value Ref Range    Hold Specimen JIC    Extra Green Top (Lithium Heparin) ON ICE   Result Value Ref Range    Hold Specimen JIC    Extra Blood Culture Bottle   Result Value Ref Range    Hold Specimen JIC    CBC (+ platelets, no diff)   Result Value Ref Range    WBC Count 11.2 (H) 4.0 - 11.0 10e3/uL    RBC Count 3.75 (L) 4.40 - 5.90 10e6/uL    Hemoglobin 12.8 (L) 13.3 - 17.7 g/dL    Hematocrit 39.0 (L) 40.0 - 53.0 %     (H) 78 - 100 fL    MCH 34.1 (H) 26.5 - 33.0 pg    MCHC 32.8 31.5 - 36.5 g/dL    RDW 13.2 10.0 - 15.0 %    Platelet Count 133 (L) 150 - 450 10e3/uL   Lactic acid whole blood   Result Value Ref Range    Lactic Acid 1.2 0.7 - 2.0 mmol/L   Basic metabolic panel   Result Value Ref Range    Sodium 138 136 - 145 mmol/L    Potassium 3.7 3.5 - 5.0 mmol/L     Chloride 105 98 - 107 mmol/L    Carbon Dioxide (CO2) 23 22 - 31 mmol/L    Anion Gap 10 5 - 18 mmol/L    Urea Nitrogen 12 8 - 28 mg/dL    Creatinine 0.89 0.70 - 1.30 mg/dL    Calcium 8.9 8.5 - 10.5 mg/dL    Glucose 125 70 - 125 mg/dL    GFR Estimate 84 >60 mL/min/1.73m2   Symptomatic COVID-19 Virus (Coronavirus) by PCR Nasopharyngeal    Specimen: Nasopharyngeal; Swab   Result Value Ref Range    SARS CoV2 PCR Negative Negative       RADIOLOGY:  Reviewed all pertinent imaging. Please see official radiology report.  XR Chest Port 1 View   Final Result   IMPRESSION: Heart size within normal limits for portable technique. Pulmonary vascularity normal. Elevated right hemidiaphragm and small granuloma right base. Lungs otherwise clear.             MEDICATIONS GIVEN IN THE EMERGENCY:  Medications   sodium chloride (PF) 0.9% PF flush 3 mL (has no administration in time range)   sodium chloride (PF) 0.9% PF flush 3 mL (has no administration in time range)   0.9% sodium chloride BOLUS (1,000 mLs Intravenous New Bag 10/11/21 1907)     Followed by   sodium chloride 0.9% infusion (has no administration in time range)   piperacillin-tazobactam (ZOSYN) 3.375 g vial to attach to  mL bag (0 g Intravenous Stopped 10/11/21 1940)   acetaminophen (TYLENOL) tablet 650 mg (650 mg Oral Given 10/11/21 1932)           NEW PRESCRIPTIONS STARTED AT TODAY'S ER VISIT:  New Prescriptions    No medications on file          FINAL DIAGNOSIS:    ICD-10-CM    1. Cellulitis of left upper extremity  L03.114             At the conclusion of the encounter I discussed the results of all of the tests and the disposition. The questions were answered. The patient or family acknowledged understanding and was agreeable with the care plan.     NAME: Reggie Gomes  AGE: 76 year old male  YOB: 1945  MRN: [id number]  EVALUATION DATE & TIME: 10/11/2021  5:33 PM    PCP: Marlon Mae    ED PROVIDER: MAN Ruiz, Kristal Kerns, am  serving as a scribe to document services personally performed by Dr. Álvaro Gresham based on my observation and the provider's statements to me. I, Álvaro Gresham MD attest that Kristal Kerns is acting in a scribe capacity, has observed my performance of the services and has documented them in accordance with my direction.    Álvaro Gresham M.D.  Emergency Medicine  [facility] EMERGENCY DEPARTMENT  [address] [place] [id number]  [phone]  Dept: [phone]  10/11/2021       Álvaro Gresham MD  10/11/21 2052

## 2021-10-12 LAB
ANION GAP SERPL CALCULATED.3IONS-SCNC: 8 MMOL/L (ref 5–18)
APTT PPP: 28 SECONDS (ref 22–38)
BASE EXCESS BLDV CALC-SCNC: 3.2 MMOL/L
BASOPHILS # BLD AUTO: 0 10E3/UL (ref 0–0.2)
BASOPHILS NFR BLD AUTO: 0 %
BUN SERPL-MCNC: 10 MG/DL (ref 8–28)
CALCIUM SERPL-MCNC: 8.6 MG/DL (ref 8.5–10.5)
CHLORIDE BLD-SCNC: 107 MMOL/L (ref 98–107)
CO2 SERPL-SCNC: 26 MMOL/L (ref 22–31)
CREAT SERPL-MCNC: 0.84 MG/DL (ref 0.7–1.3)
EOSINOPHIL # BLD AUTO: 0 10E3/UL (ref 0–0.7)
EOSINOPHIL NFR BLD AUTO: 0 %
ERYTHROCYTE [DISTWIDTH] IN BLOOD BY AUTOMATED COUNT: 13.2 % (ref 10–15)
GFR SERPL CREATININE-BSD FRML MDRD: 86 ML/MIN/1.73M2
GLUCOSE BLD-MCNC: 105 MG/DL (ref 70–125)
HCO3 BLDV-SCNC: 27 MMOL/L (ref 24–30)
HCT VFR BLD AUTO: 36.3 % (ref 40–53)
HGB BLD-MCNC: 11.6 G/DL (ref 13.3–17.7)
HOLD SPECIMEN: NORMAL
IMM GRANULOCYTES # BLD: 0.1 10E3/UL
IMM GRANULOCYTES NFR BLD: 1 %
LYMPHOCYTES # BLD AUTO: 1 10E3/UL (ref 0.8–5.3)
LYMPHOCYTES NFR BLD AUTO: 10 %
MCH RBC QN AUTO: 34 PG (ref 26.5–33)
MCHC RBC AUTO-ENTMCNC: 32 G/DL (ref 31.5–36.5)
MCV RBC AUTO: 107 FL (ref 78–100)
MONOCYTES # BLD AUTO: 0.5 10E3/UL (ref 0–1.3)
MONOCYTES NFR BLD AUTO: 6 %
NEUTROPHILS # BLD AUTO: 7.8 10E3/UL (ref 1.6–8.3)
NEUTROPHILS NFR BLD AUTO: 83 %
NRBC # BLD AUTO: 0 10E3/UL
NRBC BLD AUTO-RTO: 0 /100
OXYHGB MFR BLDV: 86.8 % (ref 70–75)
PCO2 BLDV: 44 MM HG (ref 35–50)
PH BLDV: 7.41 [PH] (ref 7.35–7.45)
PLATELET # BLD AUTO: 122 10E3/UL (ref 150–450)
PO2 BLDV: 55 MM HG (ref 25–47)
POTASSIUM BLD-SCNC: 4 MMOL/L (ref 3.5–5)
RBC # BLD AUTO: 3.41 10E6/UL (ref 4.4–5.9)
SAO2 % BLDV: 89.2 % (ref 70–75)
SODIUM SERPL-SCNC: 141 MMOL/L (ref 136–145)
WBC # BLD AUTO: 9.4 10E3/UL (ref 4–11)

## 2021-10-12 PROCEDURE — 120N000001 HC R&B MED SURG/OB

## 2021-10-12 PROCEDURE — 250N000011 HC RX IP 250 OP 636: Performed by: HOSPITALIST

## 2021-10-12 PROCEDURE — 85004 AUTOMATED DIFF WBC COUNT: CPT | Performed by: HOSPITALIST

## 2021-10-12 PROCEDURE — 85730 THROMBOPLASTIN TIME PARTIAL: CPT | Performed by: EMERGENCY MEDICINE

## 2021-10-12 PROCEDURE — 36415 COLL VENOUS BLD VENIPUNCTURE: CPT | Performed by: HOSPITALIST

## 2021-10-12 PROCEDURE — 80048 BASIC METABOLIC PNL TOTAL CA: CPT | Performed by: HOSPITALIST

## 2021-10-12 PROCEDURE — 250N000013 HC RX MED GY IP 250 OP 250 PS 637: Performed by: HOSPITALIST

## 2021-10-12 PROCEDURE — 82805 BLOOD GASES W/O2 SATURATION: CPT | Performed by: EMERGENCY MEDICINE

## 2021-10-12 PROCEDURE — 99233 SBSQ HOSP IP/OBS HIGH 50: CPT | Performed by: FAMILY MEDICINE

## 2021-10-12 PROCEDURE — 36415 COLL VENOUS BLD VENIPUNCTURE: CPT | Performed by: EMERGENCY MEDICINE

## 2021-10-12 PROCEDURE — 250N000011 HC RX IP 250 OP 636: Performed by: FAMILY MEDICINE

## 2021-10-12 RX ORDER — SODIUM CHLORIDE 9 MG/ML
INJECTION, SOLUTION INTRAVENOUS CONTINUOUS
Status: ACTIVE | OUTPATIENT
Start: 2021-10-12 | End: 2021-10-12

## 2021-10-12 RX ORDER — ACETAMINOPHEN 650 MG/1
650 SUPPOSITORY RECTAL EVERY 6 HOURS PRN
Status: DISCONTINUED | OUTPATIENT
Start: 2021-10-12 | End: 2021-10-14 | Stop reason: HOSPADM

## 2021-10-12 RX ORDER — ASPIRIN 81 MG/1
81 TABLET, CHEWABLE ORAL DAILY
Status: DISCONTINUED | OUTPATIENT
Start: 2021-10-12 | End: 2021-10-14 | Stop reason: HOSPADM

## 2021-10-12 RX ORDER — ONDANSETRON 2 MG/ML
4 INJECTION INTRAMUSCULAR; INTRAVENOUS EVERY 6 HOURS PRN
Status: DISCONTINUED | OUTPATIENT
Start: 2021-10-12 | End: 2021-10-14 | Stop reason: HOSPADM

## 2021-10-12 RX ORDER — DULOXETIN HYDROCHLORIDE 60 MG/1
60 CAPSULE, DELAYED RELEASE ORAL 2 TIMES DAILY
Status: DISCONTINUED | OUTPATIENT
Start: 2021-10-12 | End: 2021-10-14 | Stop reason: HOSPADM

## 2021-10-12 RX ORDER — CYCLOBENZAPRINE HCL 5 MG
5 TABLET ORAL DAILY PRN
Status: DISCONTINUED | OUTPATIENT
Start: 2021-10-12 | End: 2021-10-14 | Stop reason: HOSPADM

## 2021-10-12 RX ORDER — LIDOCAINE 40 MG/G
CREAM TOPICAL
Status: DISCONTINUED | OUTPATIENT
Start: 2021-10-12 | End: 2021-10-14 | Stop reason: HOSPADM

## 2021-10-12 RX ORDER — POLYETHYLENE GLYCOL 3350 17 G/17G
17 POWDER, FOR SOLUTION ORAL DAILY PRN
Status: DISCONTINUED | OUTPATIENT
Start: 2021-10-12 | End: 2021-10-14 | Stop reason: HOSPADM

## 2021-10-12 RX ORDER — ATORVASTATIN CALCIUM 40 MG/1
40 TABLET, FILM COATED ORAL EVERY MORNING
Status: DISCONTINUED | OUTPATIENT
Start: 2021-10-12 | End: 2021-10-14 | Stop reason: HOSPADM

## 2021-10-12 RX ORDER — PROCHLORPERAZINE 25 MG
12.5 SUPPOSITORY, RECTAL RECTAL EVERY 12 HOURS PRN
Status: DISCONTINUED | OUTPATIENT
Start: 2021-10-12 | End: 2021-10-14 | Stop reason: HOSPADM

## 2021-10-12 RX ORDER — PROCHLORPERAZINE MALEATE 5 MG
5 TABLET ORAL EVERY 6 HOURS PRN
Status: DISCONTINUED | OUTPATIENT
Start: 2021-10-12 | End: 2021-10-14 | Stop reason: HOSPADM

## 2021-10-12 RX ORDER — PANTOPRAZOLE SODIUM 20 MG/1
40 TABLET, DELAYED RELEASE ORAL
Status: DISCONTINUED | OUTPATIENT
Start: 2021-10-12 | End: 2021-10-14 | Stop reason: HOSPADM

## 2021-10-12 RX ORDER — GABAPENTIN 300 MG/1
300 CAPSULE ORAL 2 TIMES DAILY
Status: DISCONTINUED | OUTPATIENT
Start: 2021-10-12 | End: 2021-10-14 | Stop reason: HOSPADM

## 2021-10-12 RX ORDER — ONDANSETRON 4 MG/1
4 TABLET, ORALLY DISINTEGRATING ORAL EVERY 6 HOURS PRN
Status: DISCONTINUED | OUTPATIENT
Start: 2021-10-12 | End: 2021-10-14 | Stop reason: HOSPADM

## 2021-10-12 RX ORDER — ACETAMINOPHEN 325 MG/1
650 TABLET ORAL EVERY 6 HOURS PRN
Status: DISCONTINUED | OUTPATIENT
Start: 2021-10-12 | End: 2021-10-14 | Stop reason: HOSPADM

## 2021-10-12 RX ORDER — CALCIUM CARBONATE 500(1250)
500 TABLET ORAL DAILY
Status: DISCONTINUED | OUTPATIENT
Start: 2021-10-12 | End: 2021-10-14 | Stop reason: HOSPADM

## 2021-10-12 RX ORDER — ATENOLOL 25 MG/1
25 TABLET ORAL DAILY
Status: DISCONTINUED | OUTPATIENT
Start: 2021-10-12 | End: 2021-10-14 | Stop reason: HOSPADM

## 2021-10-12 RX ORDER — CEFTRIAXONE 1 G/1
1 INJECTION, POWDER, FOR SOLUTION INTRAMUSCULAR; INTRAVENOUS EVERY 24 HOURS
Status: DISCONTINUED | OUTPATIENT
Start: 2021-10-12 | End: 2021-10-13

## 2021-10-12 RX ORDER — TAMSULOSIN HYDROCHLORIDE 0.4 MG/1
0.8 CAPSULE ORAL DAILY
Status: DISCONTINUED | OUTPATIENT
Start: 2021-10-12 | End: 2021-10-14 | Stop reason: HOSPADM

## 2021-10-12 RX ORDER — LANOLIN ALCOHOL/MO/W.PET/CERES
1000 CREAM (GRAM) TOPICAL DAILY
Status: DISCONTINUED | OUTPATIENT
Start: 2021-10-12 | End: 2021-10-14 | Stop reason: HOSPADM

## 2021-10-12 RX ADMIN — GABAPENTIN 300 MG: 300 CAPSULE ORAL at 20:44

## 2021-10-12 RX ADMIN — ENOXAPARIN SODIUM 40 MG: 40 INJECTION SUBCUTANEOUS at 20:44

## 2021-10-12 RX ADMIN — CEFTRIAXONE SODIUM 1 G: 1 INJECTION, POWDER, FOR SOLUTION INTRAMUSCULAR; INTRAVENOUS at 14:45

## 2021-10-12 RX ADMIN — DULOXETINE HYDROCHLORIDE 60 MG: 60 CAPSULE, DELAYED RELEASE ORAL at 20:44

## 2021-10-12 RX ADMIN — ATENOLOL 25 MG: 25 TABLET ORAL at 09:16

## 2021-10-12 RX ADMIN — Medication 50 MG: at 09:15

## 2021-10-12 RX ADMIN — AZITHROMYCIN MONOHYDRATE 250 MG: 250 TABLET ORAL at 09:17

## 2021-10-12 RX ADMIN — Medication 2.08 G: at 09:15

## 2021-10-12 RX ADMIN — ASPIRIN 81 MG CHEWABLE TABLET 81 MG: 81 TABLET CHEWABLE at 09:16

## 2021-10-12 RX ADMIN — ENOXAPARIN SODIUM 40 MG: 40 INJECTION SUBCUTANEOUS at 01:40

## 2021-10-12 RX ADMIN — DULOXETINE HYDROCHLORIDE 60 MG: 60 CAPSULE, DELAYED RELEASE ORAL at 09:16

## 2021-10-12 RX ADMIN — CYANOCOBALAMIN TAB 1000 MCG 1000 MCG: 1000 TAB at 09:17

## 2021-10-12 RX ADMIN — Medication 500 MG: at 09:16

## 2021-10-12 RX ADMIN — ATORVASTATIN CALCIUM 40 MG: 40 TABLET, FILM COATED ORAL at 09:16

## 2021-10-12 RX ADMIN — ACETAMINOPHEN 650 MG: 325 TABLET ORAL at 09:25

## 2021-10-12 RX ADMIN — GABAPENTIN 300 MG: 300 CAPSULE ORAL at 09:16

## 2021-10-12 RX ADMIN — TAMSULOSIN HYDROCHLORIDE 0.8 MG: 0.4 CAPSULE ORAL at 09:16

## 2021-10-12 RX ADMIN — PANTOPRAZOLE SODIUM 40 MG: 20 TABLET, DELAYED RELEASE ORAL at 06:50

## 2021-10-12 ASSESSMENT — MIFFLIN-ST. JEOR: SCORE: 1675.5

## 2021-10-12 ASSESSMENT — ACTIVITIES OF DAILY LIVING (ADL): DEPENDENT_IADLS:: INDEPENDENT

## 2021-10-12 NOTE — PHARMACY-ADMISSION MEDICATION HISTORY
Pharmacy Vancomycin Initial Note  Date of Service 2021  Patient's  1945  76 year old, male    Indication: Sepsis and Skin and Soft Tissue Infection    Current estimated CrCl = Estimated Creatinine Clearance: 78.6 mL/min (based on SCr of 0.89 mg/dL).    Creatinine for last 3 days  10/11/2021:  5:42 PM Creatinine 0.89 mg/dL    Recent Vancomycin Level(s) for last 3 days  No results found for requested labs within last 72 hours.      Vancomycin IV Administrations (past 72 hours)      No vancomycin orders with administrations in past 72 hours.                Nephrotoxins and other renal medications (From now, onward)    Start     Dose/Rate Route Frequency Ordered Stop    10/11/21 2200  vancomycin (VANCOCIN) 1,750 mg in sodium chloride 0.9 % 500 mL intermittent infusion      1,750 mg  over 2 Hours Intravenous ONCE 10/11/21 2138            Contrast Orders - past 72 hours (72h ago, onward)    None        Plan:  1. Start vancomycin  1750mg IV ONCE.    Saundar Fry, [facility]

## 2021-10-12 NOTE — PROGRESS NOTES
[facility]    Medicine Progress Note - Hospitalist Service       Date of Admission:  10/11/2021    Assessment & Plan              Reggie Gomes is a 76 year old male admitted on 10/11/202 with cellulitis causing early sepsis involving left arm    1.  Sepsis, left upper extremity cellulitis  ---resolved  ---no clear scratches  --- Rash distinctive and more suggestive of erysipelas given papular coalesced eruption  --- Asked nurse to outline rash  --- Change antibiotics to ceftriaxone IV; was given vancomycin and azithromycin  ---No recent trauma to the left upper extremity   --- Normal CK level   ---keep arm elevated    Generalized weakness,  --- Suspect related to sepsis infection  ---Patient slid off the couch and felt generally weak but denies head trauma or loss of consciousness  ---He has right foot drop after cervical spine surgery 5 years ago  --- PT and OT to assess  ---Fall precautions    Subacute Cough  ---Ongoing for about 6 months  ---Chest x-ray negative  ---continue to hold lisinopril for now and monitor    4.  Urinary incontinence  ---History of prostate and bladder cancer  ---Continue Flomax 0.8 mg daily    5.  Coronary artery disease  ---History of stent  ---Continue aspirin, atorvastatin (if CK level not elevated) and atenolol    6.  Essential hypertension  ---bp and bmp stable  ---Continue atenolol, holding lisinopril    7.  Chronic low back pain  Continue Flexeril, duloxetine, gabapentin    8.  GERD  Continue omeprazole       Diet: Combination Diet Low Saturated Fat Na <2400mg Diet    DVT Prophylaxis: Enoxaparin (Lovenox) SQ  Ferrer Catheter: Not present  Central Lines: None  Code Status: Full Code      Disposition Plan   Expected discharge: 10/14/2021 pending improvement in rash    rThe patient's care was discussed with the Bedside Nurse and Care Coordinator/.    Misti Sanon MD  Hospitalist Service  [facility]  Securely message with  the [employer] Web Console (learn more here)  Text page via [facility] Paging/Directory                       ______________________________________________________________________    Interval History   Patient seen and chart reviewed. Care discussed with the nursing staff as well. He declines me calling family    He is able to tell me that the symptoms came up very quickly. He does not remember his cat scratching him but he has had cat scratches on occasion. He remembers the symptoms being just centered on his arm starting Sunday to the point where he thought he had a sunburn. Rapid progression to include his hand and up to his elbow. He had a fever here. His main concern was weakness and he fell and was unable to get himself up at home. 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 [place] to have sepsis from cellulitis    Review of systems; no diarrhea, no chest pain, no new drugs. No rashes previous to this. Also denies dysuria increased frequency or hematuria or other rashes    Data reviewed today: I reviewed all medications, new labs and imaging results over the last 24 hours. I personally reviewed no images or EKG's today.    Physical Exam   Vital Signs: Temp: 99.8  F (37.7  C) Temp src: Oral BP: (!) 141/77 Pulse: 84   Resp: 24 SpO2: 93 % O2 Device: None (Room air)    Weight: 220 lbs .31 oz  General Appearance: Was not, conversational, no apparent distress  Respiratory: Clear to auscultation bilaterally  Cardiovascular: Regular rate and rhythm without significant murmurs rubs or gallops  GI: Soft, nontender without hepatosplenomegaly  Skin: He has a papular coalescent eruption starting with swelling involving the dorsum of his left hand and extending proximately to his elbow. He has erythematous demarcation of the almost purpuric papular eruption that is just mildly tender more in the hand. It then changes appearance to be more darker in appearance over the arm, however  leading edge of rash which is approximately 1 cm is more erythematous. Nonscaly. Nonpustular. Skin intact. No scratches that I can appreciate.  Other: Trace to 1+ lower extremity    Data     Recent Results (from the past 24 hour(s))   Extra Blood Culture Bottle    Collection Time: 10/11/21  5:42 PM   Result Value Ref Range    Hold Specimen JIC    Extra Blue Top Tube    Collection Time: 10/11/21  5:42 PM   Result Value Ref Range    Hold Specimen JIC    Extra Red Top Tube    Collection Time: 10/11/21  5:42 PM   Result Value Ref Range    Hold Specimen JIC    Extra Green Top (Lithium Heparin) Tube    Collection Time: 10/11/21  5:42 PM   Result Value Ref Range    Hold Specimen JIC    Extra Purple Top Tube    Collection Time: 10/11/21  5:42 PM   Result Value Ref Range    Hold Specimen JIC    Extra Green Top (Lithium Heparin) ON ICE    Collection Time: 10/11/21  5:42 PM   Result Value Ref Range    Hold Specimen JIC    Extra Blood Culture Bottle    Collection Time: 10/11/21  5:42 PM   Result Value Ref Range    Hold Specimen JIC    CBC (+ platelets, no diff)    Collection Time: 10/11/21  5:42 PM   Result Value Ref Range    WBC Count 11.2 (H) 4.0 - 11.0 10e3/uL    RBC Count 3.75 (L) 4.40 - 5.90 10e6/uL    Hemoglobin 12.8 (L) 13.3 - 17.7 g/dL    Hematocrit 39.0 (L) 40.0 - 53.0 %     (H) 78 - 100 fL    MCH 34.1 (H) 26.5 - 33.0 pg    MCHC 32.8 31.5 - 36.5 g/dL    RDW 13.2 10.0 - 15.0 %    Platelet Count 133 (L) 150 - 450 10e3/uL   Lactic acid whole blood    Collection Time: 10/11/21  5:42 PM   Result Value Ref Range    Lactic Acid 1.2 0.7 - 2.0 mmol/L   Basic metabolic panel    Collection Time: 10/11/21  5:42 PM   Result Value Ref Range    Sodium 138 136 - 145 mmol/L    Potassium 3.7 3.5 - 5.0 mmol/L    Chloride 105 98 - 107 mmol/L    Carbon Dioxide (CO2) 23 22 - 31 mmol/L    Anion Gap 10 5 - 18 mmol/L    Urea Nitrogen 12 8 - 28 mg/dL    Creatinine 0.89 0.70 - 1.30 mg/dL    Calcium 8.9 8.5 - 10.5 mg/dL    Glucose 125 70 -  125 mg/dL    GFR Estimate 84 >60 mL/min/1.73m2   Procalcitonin    Collection Time: 10/11/21  5:42 PM   Result Value Ref Range    Procalcitonin 0.08 0.00 - 0.49 ng/mL   Lipase    Collection Time: 10/11/21  5:42 PM   Result Value Ref Range    Lipase <9 0 - 52 U/L   INR    Collection Time: 10/11/21  5:42 PM   Result Value Ref Range    INR 1.16 (H) 0.90 - 1.15   CK total    Collection Time: 10/11/21  5:42 PM   Result Value Ref Range     30 - 190 U/L   Symptomatic COVID-19 Virus (Coronavirus) by PCR Nasopharyngeal    Collection Time: 10/11/21  6:40 PM    Specimen: Nasopharyngeal; Swab   Result Value Ref Range    SARS CoV2 PCR Negative Negative   Blood gas venous    Collection Time: 10/12/21  1:36 AM   Result Value Ref Range    pH Venous 7.41 7.35 - 7.45    pCO2 Venous 44 35 - 50 mm Hg    pO2 Venous 55 (H) 25 - 47 mm Hg    Bicarbonate Venous 27 24 - 30 mmol/L    Base Excess/Deficit (+/-) 3.2   mmol/L    Oxyhemoglobin Venous 86.8 (H) 70.0 - 75.0 %    O2 Sat, Venous 89.2 (H) 70.0 - 75.0 %   Partial thromboplastin time    Collection Time: 10/12/21  1:36 AM   Result Value Ref Range    aPTT 28 22 - 38 Seconds   Extra Red Top Tube    Collection Time: 10/12/21  1:43 AM   Result Value Ref Range    Hold Specimen [facility]    Basic metabolic panel    Collection Time: 10/12/21  5:28 AM   Result Value Ref Range    Sodium 141 136 - 145 mmol/L    Potassium 4.0 3.5 - 5.0 mmol/L    Chloride 107 98 - 107 mmol/L    Carbon Dioxide (CO2) 26 22 - 31 mmol/L    Anion Gap 8 5 - 18 mmol/L    Urea Nitrogen 10 8 - 28 mg/dL    Creatinine 0.84 0.70 - 1.30 mg/dL    Calcium 8.6 8.5 - 10.5 mg/dL    Glucose 105 70 - 125 mg/dL    GFR Estimate 86 >60 mL/min/1.73m2   CBC with platelets and differential    Collection Time: 10/12/21  5:28 AM   Result Value Ref Range    WBC Count 9.4 4.0 - 11.0 10e3/uL    RBC Count 3.41 (L) 4.40 - 5.90 10e6/uL    Hemoglobin 11.6 (L) 13.3 - 17.7 g/dL    Hematocrit 36.3 (L) 40.0 - 53.0 %     (H) 78 - 100 fL    MCH 34.0  (H) 26.5 - 33.0 pg    MCHC 32.0 31.5 - 36.5 g/dL    RDW 13.2 10.0 - 15.0 %    Platelet Count 122 (L) 150 - 450 10e3/uL    % Neutrophils 83 %    % Lymphocytes 10 %    % Monocytes 6 %    % Eosinophils 0 %    % Basophils 0 %    % Immature Granulocytes 1 %    NRBCs per 100 WBC 0 <1 /100    Absolute Neutrophils 7.8 1.6 - 8.3 10e3/uL    Absolute Lymphocytes 1.0 0.8 - 5.3 10e3/uL    Absolute Monocytes 0.5 0.0 - 1.3 10e3/uL    Absolute Eosinophils 0.0 0.0 - 0.7 10e3/uL    Absolute Basophils 0.0 0.0 - 0.2 10e3/uL    Absolute Immature Granulocytes 0.1 (H) <=0.0 10e3/uL    Absolute NRBCs 0.0 10e3/uL

## 2021-10-12 NOTE — PLAN OF CARE
"  Problem: Adult Inpatient Plan of Care  Goal: Plan of Care Review  Outcome: No Change   Patient is afebrile. Left arm is red and swollen from fingers to elbow. Red area marked. Pt is diaphoretic but he states he is always \"hot\". Will be starting Rocephin 1 gram IVPB. Tolerating regular diet.  "

## 2021-10-12 NOTE — PLAN OF CARE
Problem: Adult Inpatient Plan of Care  Goal: Plan of Care Review  Outcome: No Change  Goal: Patient-Specific Goal (Individualized)  Outcome: No Change  Goal: Absence of Hospital-Acquired Illness or Injury  Outcome: No Change  Intervention: Prevent Skin Injury  Recent Flowsheet Documentation  Taken 10/12/2021 0000 by Lorena Dubon, RN  Body Position: position changed independently  Goal: Optimal Comfort and Wellbeing  Outcome: No Change  Goal: Readiness for Transition of Care  Outcome: No Change  Intervention: Mutually Develop Transition Plan  Recent Flowsheet Documentation  Taken 10/12/2021 0000 by Lorena Dubon, RN  Equipment Currently Used at Home: cane, straight     Problem: Risk for Delirium  Goal: Optimal Coping  Outcome: No Change  Goal: Improved Behavioral Control  Outcome: No Change  Goal: Improved Attention and Thought Clarity  Outcome: No Change  Goal: Improved Sleep  Outcome: No Change    New patient admission done

## 2021-10-12 NOTE — H&P
[facility]    History and Physical - Hospitalist Service       Date of Admission:  10/11/2021    Assessment & Plan      Reggie Gomes is a 76 year old male admitted on 10/11/2021. He came into the ED for evaluation of left arm pain, swelling and redness for 3 days and generalized weakness and fever that started in the morning.    1.  Sepsis, left upper extremity cellulitis  No recent trauma to the left upper extremity but has a house cat that sometimes scratches him  IV vancomycin for cellulitis and azithromycin to cover for possible cat scratch disease  Check CK level  Keep arm elevated  Monitor CMS for signs and symptoms of compartment syndrome    2.  Generalized weakness, upper back and neck pain  Patient slid off the couch and felt generally weak but denies head trauma or loss of consciousness  No meningismus on exam  He has right foot drop after cervical spine surgery 5 years ago  Supportive management  Neurochecks  Fall precautions  Consider cervical spine imaging if symptoms are not improving    3.  Cough  Ongoing for about 6 months  Chest x-ray showed elevated right hemidiaphragm and small granuloma right base, lungs otherwise clear.  We will hold lisinopril for now and monitor    4.  Urinary incontinence  History of prostate and bladder cancer  Continue Flomax 0.8 mg daily    5.  Coronary artery disease  History of stent  Denies angina symptoms  Continue aspirin, atorvastatin (if CK level not elevated) and atenolol    6.  Essential hypertension  Continue atenolol, holding lisinopril    7.  Chronic low back pain  Continue Flexeril, duloxetine, gabapentin    8.  GERD  Continue omeprazole     Diet:  Cardiac  DVT Prophylaxis: Enoxaparin (Lovenox) SQ  Ferrer Catheter: Not present  Central Lines: None  Code Status:  Full code    Clinically Significant Risk Factors Present on Admission              # Platelet Defect: home medication list includes an antiplatelet medication       Disposition Plan   Expected discharge:  at least 2 midnights recommended to prior living arrangement once antibiotic plan established and SIRS/Sepsis treated.     The patient's care was discussed with the Patient.    Gurpreet Mcpherson MD  [facility]  Securely message with the Vocera Web Console (learn more here)  Text page via [facility] Paging/Directory      ______________________________________________________________________    Chief Complaint   Weakness, fever, left arm pain    History is obtained from the patient, electronic health record and emergency department physician    History of Present Illness   Reggie Gomes is a 76 year old male who was brought to the emergency department by ambulance for evaluation of fever and generalized weakness.  Past medical history significant for coronary artery disease status post stent, hypertension, dyslipidemia, prostate cancer, bladder cancer, chronic low back pain, cervical laminectomy, right foot drop.  He lives at home and provides cares for his wife.  Over the last 3 days he noted swelling, redness and pain of the left arm.  He denies any falls or trauma but noted getting scratched from his house cat at times.  Today, he took a nap on the couch after lunch and when he woke up felt weak and could not get up.  He slid off the couch onto the floor but denies head trauma or loss of consciousness.  He felt headache, neck pain, upper back pain, nausea, vomiting and increased left arm pain.  He is able to move left arm although limited due to pain, however denies numbness or tingling sensation.  He reported an ongoing dry cough over the last 6 months.  He denies chest pain, shortness of breath, orthopnea, palpitations, lower extremity edema, abdominal pain, diarrhea, melena or hematochezia.  He has urinary incontinence for which he takes Flomax.  He denies tobacco but drinks a glass of wine daily and smokes marijuana occasionally for insomnia  although has not smoked in 3 months.    Review of Systems    The 10 point Review of Systems is negative other than noted in the HPI or here.     Past Medical History    I have reviewed this patient's medical history and updated it with pertinent information if needed.   Past Medical History:   Diagnosis Date     Arthritis     osteo     BPH (benign prostatic hyperplasia)      Cancer (H)     prostate and bladder     Coronary artery disease      GERD (gastroesophageal reflux disease)      Hydrocele      Hyperlipidemia      Hypertension      Lumbar spinal stenosis      Myocardial infarction (H) 2004     Prostate cancer (H)        Past Surgical History   I have reviewed this patient's surgical history and updated it with pertinent information if needed.  Past Surgical History:   Procedure Laterality Date     APPENDECTOMY       ARTHROPLASTY REVISION HIP Right 2014     BOWEL RESECTION      with colostomy and subsequent takedown of colostomy     C TOTAL HIP ARTHROPLASTY Left 5/11/2018    Procedure: LEFT, ARTHROPLASTY, TOTAL, TOTAL DIRECT ANTERIOR APPROACH;  Surgeon: Manuel Yap MD;  Location: [facility];  Service: Orthopedics     COLON SURGERY       CORONARY ANGIOPLASTY  2004    1 stent     HERNIA REPAIR       LUMBAR SPINE SURGERY      X2     ORCHIECTOMY SCROTAL Right 10/27/2015    Procedure: SIMPLE RIGHT ORCHIECTOMY;  Surgeon: Mario Alberto Thompson MD;  [place] [facility] [place];  Service:      PROSTATE SURGERY       SMALL INTESTINE SURGERY       SPINE SURGERY       TOTAL HIP ARTHROPLASTY Right 5/2014     VASECTOMY         Social History   I have reviewed this patient's social history and updated it with pertinent information if needed.  Social History     Tobacco Use     Smoking status: Former Smoker     Quit date: 10/26/2005     Years since quitting: 15.9     Smokeless tobacco: Never Used   Substance Use Topics     Alcohol use: Yes     Alcohol/week: 1.0 standard drinks     Comment: Alcoholic Drinks/day:  daily     Drug use: Yes     Types: Hydrocodone     Comment: Drug use: takes 1 tablet 4 times daily       Family History   I have reviewed this patient's family history and updated it with pertinent information if needed.  Family History   Problem Relation Age of Onset     Hypertension Father      Coronary Artery Disease Mother      Anesthesia Reaction No family hx of        Prior to Admission Medications   Prior to Admission Medications   Prescriptions Last Dose Informant Patient Reported? Taking?   CALCIUM CARBONATE (CALCIUM 500 ORAL) 10/10/2021 at am  Yes Yes   Sig: [CALCIUM CARBONATE (CALCIUM 500 ORAL)] Take 1 tablet by mouth daily.   DULoxetine (CYMBALTA) 60 MG capsule 10/10/2021 at pm  Yes Yes   Sig: Take 60 mg by mouth 2 times daily   acetaminophen (TYLENOL) 500 MG tablet not recently  Yes Yes   Sig: Take 1,000 mg by mouth every 6 hours as needed for mild pain   aspirin (ASA) 81 MG chewable tablet 10/10/2021 at am  Yes Yes   Sig: Take 81 mg by mouth daily   atenolol (TENORMIN) 25 MG tablet 10/10/2021 at am  Yes Yes   Sig: Take 25 mg by mouth daily   atorvastatin (LIPITOR) 40 MG tablet 10/10/2021 at am  Yes Yes   Sig: [ATORVASTATIN (LIPITOR) 40 MG TABLET] Take 40 mg by mouth every morning.   cyclobenzaprine (FLEXERIL) 5 MG tablet not recent  Yes Yes   Sig: [CYCLOBENZAPRINE (FLEXERIL) 5 MG TABLET] Take 5 mg by mouth daily as needed for muscle spasms.   gabapentin (NEURONTIN) 300 MG capsule 10/10/2021 at hs  Yes Yes   Sig: Take 300 mg by mouth 2 times daily Bid at noon and hs   lisinopril (PRINIVIL,ZESTRIL) 5 MG tablet 10/10/2021 at am  No Yes   Sig: [LISINOPRIL (PRINIVIL,ZESTRIL) 5 MG TABLET] Take 1 tablet (5 mg total) by mouth daily.   omeprazole (PRILOSEC) 20 MG capsule 10/10/2021 at am  Yes Yes   Sig: [OMEPRAZOLE (PRILOSEC) 20 MG CAPSULE] Take 20 mg by mouth daily.   psyllium (METAMUCIL) 0.52 gram capsule 10/10/2021 at am  Yes Yes   Sig: Take 2.08 g by mouth daily 4 caps qday   tamsulosin (FLOMAX) 0.4 mg Cp24  10/10/2021 at am(0.8mg)  No Yes   Sig: [TAMSULOSIN (FLOMAX) 0.4 MG CP24] Take 1 capsule (0.4 mg total) by mouth 2 (two) times a day.   Patient taking differently: Take 0.8 mg by mouth daily 1cap qday x 14 days then 2 caps(0.8mg) qday- 10/11/21- is on 0.8mg qday   thiamine 50 MG TABS 10/10/2021 at am  Yes Yes   Sig: Take 50 mg by mouth daily Not sure of dose at this time.   vitamin B-12 (CYANOCOBALAMIN) 1000 MCG tablet 10/10/2021 at am  Yes Yes   Sig: Take 1,000 mcg by mouth daily      Facility-Administered Medications: None     Allergies   No Known Allergies    Physical Exam   Vital Signs: Temp: (!) 100.8  F (38.2  C) Temp src: Oral BP: 131/63 Pulse: 89   Resp: (!) 33 SpO2: 94 % O2 Device: None (Room air)    Weight: 216 lbs 0 oz    Constitutional: awake, alert, cooperative, no apparent distress, and appears stated age  Eyes: extra-ocular muscles intact and sclera clear  ENT: normocepalic, without obvious abnormality, atramatic  Hematologic / Lymphatic: no cervical lymphadenopathy, no supraclavicular lymphadenopathy and no axillary lymphadenopathy  Respiratory: No increased work of breathing, good air exchange, clear to auscultation bilaterally, no crackles or wheezing  Cardiovascular: regular rate and rhythm and normal S1 and S2  GI: normal bowel sounds, soft, non-distended and non-tender  Skin: Blanching erythema, warmth, tenderness covering the dorsal hand, wrist, forearm and spreading to ventral forearm and around the left elbow.  Musculoskeletal: Left upper extremity range of motion limited due to pain,  tone is normal  Neurologic: Mental Status Exam:  Level of Alertness:   awake  Orientation:   person, place, time  Motor Exam: Left upper extremity strength limited secondary to pain  Neuropsychiatric: General: normal, calm and normal eye contact  Affect: normal    Data   Data reviewed today: I reviewed all medications, new labs and imaging results over the last 24 hours. I personally reviewed no images or EKG's  today.    Recent Labs   Lab 10/11/21  1742   WBC 11.2*   HGB 12.8*   *   *      POTASSIUM 3.7   CHLORIDE 105   CO2 23   BUN 12   CR 0.89   ANIONGAP 10   NEDA 8.9        Recent Results (from the past 24 hour(s))   XR Chest Port 1 View    Narrative    EXAM: XR CHEST PORT 1 VIEW  LOCATION: [facility]  DATE/TIME: 10/11/2021 6:32 PM    INDICATION: Fever  COMPARISON: None.      Impression    IMPRESSION: Heart size within normal limits for portable technique. Pulmonary vascularity normal. Elevated right hemidiaphragm and small granuloma right base. Lungs otherwise clear.

## 2021-10-12 NOTE — PHARMACY-ADMISSION MEDICATION HISTORY
Pharmacy Note - Admission Medication History    Pertinent Provider Information: He did not take any meds today. He had a old RX for prn oxycodone from a few years ago- talked to him about using older rx.     ______________________________________________________________________    Prior To Admission (PTA) med list completed and updated in EMR.       PTA Med List   Medication Sig Note Last Dose     acetaminophen (TYLENOL) 500 MG tablet Take 1,000 mg by mouth every 6 hours as needed for mild pain  not recently     aspirin (ASA) 81 MG chewable tablet Take 81 mg by mouth daily  10/10/2021 at am     atenolol (TENORMIN) 25 MG tablet Take 25 mg by mouth daily  10/10/2021 at am     atorvastatin (LIPITOR) 40 MG tablet [ATORVASTATIN (LIPITOR) 40 MG TABLET] Take 40 mg by mouth every morning.  10/10/2021 at am     CALCIUM CARBONATE (CALCIUM 500 ORAL) [CALCIUM CARBONATE (CALCIUM 500 ORAL)] Take 1 tablet by mouth daily.  10/10/2021 at am     cyclobenzaprine (FLEXERIL) 5 MG tablet [CYCLOBENZAPRINE (FLEXERIL) 5 MG TABLET] Take 5 mg by mouth daily as needed for muscle spasms. 10/11/2021: Has older rx of this with a few left not recent     DULoxetine (CYMBALTA) 60 MG capsule Take 60 mg by mouth 2 times daily  10/10/2021 at pm     gabapentin (NEURONTIN) 300 MG capsule Take 300 mg by mouth 2 times daily Bid at noon and hs  10/10/2021 at hs     lisinopril (PRINIVIL,ZESTRIL) 5 MG tablet [LISINOPRIL (PRINIVIL,ZESTRIL) 5 MG TABLET] Take 1 tablet (5 mg total) by mouth daily.  10/10/2021 at am     omeprazole (PRILOSEC) 20 MG capsule [OMEPRAZOLE (PRILOSEC) 20 MG CAPSULE] Take 20 mg by mouth daily.  10/10/2021 at am     psyllium (METAMUCIL) 0.52 gram capsule Take 2.08 g by mouth daily 4 caps qday  10/10/2021 at am     tamsulosin (FLOMAX) 0.4 mg Cp24 [TAMSULOSIN (FLOMAX) 0.4 MG CP24] Take 1 capsule (0.4 mg total) by mouth 2 (two) times a day. (Patient taking differently: Take 0.8 mg by mouth daily 1cap qday x 14 days then 2 caps(0.8mg) qday-  10/11/21- is on 0.8mg qday)  10/10/2021 at am(0.8mg)     thiamine 50 MG TABS Take 50 mg by mouth daily Not sure of dose at this time.  10/10/2021 at am     vitamin B-12 (CYANOCOBALAMIN) 1000 MCG tablet Take 1,000 mcg by mouth daily  10/10/2021 at am       Information source(s): Patient, Clinic records and CareEverywhere[facility] [place]  Method of interview communication: in-person    Summary of Changes to PTA Med List  New: atenolol, duloxetine  Discontinued: prn tizanidine, imodium, aleve, prn oxycodone, senna/dss  Changed: aspirin for 325mg to 81mg, flomax for 0.4mg to 0.8mg,    Patient was asked about OTC/herbal products specifically.  PTA med list reflects this.    In the past week, patient estimated taking medication this percent of the time:  greater than 90%.    Allergies were reviewed, assessed, and updated with the patient.      Patient does not use any multi-dose medications prior to admission.    The information provided in this note is only as accurate as the sources available at the time of the update(s).    Thank you for the opportunity to participate in the care of this patient.    Sal Thorpe RPH  10/11/2021 9:20 PM

## 2021-10-12 NOTE — PLAN OF CARE
Pt arrived to unit and settled into 417.  He has pain 3/10 in his left wrist when he moves his arm.  He has a red rash from dorsal hand to his elbow.  He hand and forearm are edematous, pt reports it is new.  He is alert and oriented and reports fatigue.  He is incontinent by report, using urinal.  Last bm was this morning.  He reports he has not eaten dinner.  He has vanco running in the left ac at 250 ml/hr started in the ed.

## 2021-10-12 NOTE — CONSULTS
Care Management Initial Consult    General Information  Assessment completed with: Patient,    Type of CM/SW Visit: Initial Assessment    Primary Care Provider verified and updated as needed: Yes   Readmission within the last 30 days: no previous admission in last 30 days         Advance Care Planning: Advance Care Planning Reviewed: verified with patient   (Has ACP at home)       Communication Assessment  Patient's communication style: spoken language (English or Bilingual)    Hearing Difficulty or Deaf: no   Wear Glasses or Blind: no    Cognitive  Cognitive/Neuro/Behavioral: WDL                      Living Environment:   People in home: spouse     Current living Arrangements: house      Able to return to prior arrangements: yes       Family/Social Support:  Care provided by: self  Provides care for: no one  Marital Status:   Wife, Children          Description of Support System: Supportive, Involved         Current Resources:   Patient receiving home care services: No     Community Resources: Housekeeping/Chore Agency  Equipment currently used at home: cane, straight  Supplies currently used at home: None    Employment/Financial:  Employment Status:          Financial Concerns:             Lifestyle & Psychosocial Needs:  Social Determinants of Health     Tobacco Use: Medium Risk     Smoking Tobacco Use: Former Smoker     Smokeless Tobacco Use: Never Used   Alcohol Use:      Frequency of Alcohol Consumption:      Average Number of Drinks:      Frequency of Binge Drinking:    Financial Resource Strain:      Difficulty of Paying Living Expenses:    Food Insecurity:      Worried About Running Out of Food in the Last Year:      Ran Out of Food in the Last Year:    Transportation Needs:      Lack of Transportation (Medical):      Lack of Transportation (Non-Medical):    Physical Activity:      Days of Exercise per Week:      Minutes of Exercise per Session:    Stress:      Feeling of Stress :    Social  Connections:      Frequency of Communication with Friends and Family:      Frequency of Social Gatherings with Friends and Family:      Attends Islam Services:      Active Member of Clubs or Organizations:      Attends Club or Organization Meetings:      Marital Status:    Intimate Partner Violence:      Fear of Current or Ex-Partner:      Emotionally Abused:      Physically Abused:      Sexually Abused:    Depression:      PHQ-2 Score:    Housing Stability:      Unable to Pay for Housing in the Last Year:      Number of Places Lived in the Last Year:      Unstable Housing in the Last Year:        Functional Status:  Prior to admission patient needed assistance:   Dependent ADLs:: Ambulation-cane  Dependent IADLs:: Independent                 Additional Information:  Assessment completed with patient. Patient states he lives independently in his house with spouse. He ambulates with a cane and drives. He has children who are supportive. Stated discharge goal is to return home. Family to transport.     Final discharge needs pending antibiotic treatment at discharge.     Rosamaria Malone RN

## 2021-10-12 NOTE — ED NOTES
Pt medicated per orders, see MAR.    /72   Pulse 92   Temp (!) 100.8  F (38.2  C) (Oral)   Resp 30   Wt 98 kg (216 lb)   SpO2 95%   BMI 34.86 kg/m

## 2021-10-13 ENCOUNTER — APPOINTMENT (OUTPATIENT)
Dept: PHYSICAL THERAPY | Facility: HOSPITAL | Age: 76
DRG: 872 | End: 2021-10-13
Attending: FAMILY MEDICINE
Payer: MEDICARE

## 2021-10-13 PROCEDURE — 250N000011 HC RX IP 250 OP 636: Performed by: HOSPITALIST

## 2021-10-13 PROCEDURE — 250N000013 HC RX MED GY IP 250 OP 250 PS 637: Performed by: HOSPITALIST

## 2021-10-13 PROCEDURE — 99207 PR CDG-MDM COMPONENT: MEETS MODERATE - DOWN CODED: CPT | Performed by: FAMILY MEDICINE

## 2021-10-13 PROCEDURE — 99232 SBSQ HOSP IP/OBS MODERATE 35: CPT | Performed by: FAMILY MEDICINE

## 2021-10-13 PROCEDURE — 120N000001 HC R&B MED SURG/OB

## 2021-10-13 PROCEDURE — 250N000011 HC RX IP 250 OP 636: Performed by: FAMILY MEDICINE

## 2021-10-13 PROCEDURE — 97116 GAIT TRAINING THERAPY: CPT | Mod: GP | Performed by: PHYSICAL THERAPIST

## 2021-10-13 PROCEDURE — 97162 PT EVAL MOD COMPLEX 30 MIN: CPT | Mod: GP | Performed by: PHYSICAL THERAPIST

## 2021-10-13 RX ORDER — CLINDAMYCIN HCL 150 MG
300 CAPSULE ORAL EVERY 8 HOURS SCHEDULED
Status: DISCONTINUED | OUTPATIENT
Start: 2021-10-14 | End: 2021-10-14 | Stop reason: HOSPADM

## 2021-10-13 RX ADMIN — ATENOLOL 25 MG: 25 TABLET ORAL at 08:58

## 2021-10-13 RX ADMIN — DULOXETINE HYDROCHLORIDE 60 MG: 60 CAPSULE, DELAYED RELEASE ORAL at 08:58

## 2021-10-13 RX ADMIN — Medication 2.08 G: at 08:58

## 2021-10-13 RX ADMIN — GABAPENTIN 300 MG: 300 CAPSULE ORAL at 20:00

## 2021-10-13 RX ADMIN — Medication 50 MG: at 08:58

## 2021-10-13 RX ADMIN — TAMSULOSIN HYDROCHLORIDE 0.8 MG: 0.4 CAPSULE ORAL at 08:58

## 2021-10-13 RX ADMIN — PANTOPRAZOLE SODIUM 40 MG: 20 TABLET, DELAYED RELEASE ORAL at 06:30

## 2021-10-13 RX ADMIN — GABAPENTIN 300 MG: 300 CAPSULE ORAL at 08:58

## 2021-10-13 RX ADMIN — CYANOCOBALAMIN TAB 1000 MCG 1000 MCG: 1000 TAB at 08:58

## 2021-10-13 RX ADMIN — DULOXETINE HYDROCHLORIDE 60 MG: 60 CAPSULE, DELAYED RELEASE ORAL at 20:00

## 2021-10-13 RX ADMIN — ASPIRIN 81 MG CHEWABLE TABLET 81 MG: 81 TABLET CHEWABLE at 08:58

## 2021-10-13 RX ADMIN — ATORVASTATIN CALCIUM 40 MG: 40 TABLET, FILM COATED ORAL at 08:59

## 2021-10-13 RX ADMIN — Medication 500 MG: at 08:58

## 2021-10-13 RX ADMIN — CEFTRIAXONE SODIUM 1 G: 1 INJECTION, POWDER, FOR SOLUTION INTRAMUSCULAR; INTRAVENOUS at 14:42

## 2021-10-13 RX ADMIN — ENOXAPARIN SODIUM 40 MG: 40 INJECTION SUBCUTANEOUS at 20:00

## 2021-10-13 NOTE — PROGRESS NOTES
10/13/21 1048   Quick Adds   Type of Visit Initial PT Evaluation   Living Environment   People in home spouse   Current Living Arrangements other (see comments);house  (primary caregiver wife, 3 steps to enter, Twin home, )   Home Accessibility stairs to enter home  (L side railing)   Number of Stairs, Main Entrance 3   Stair Railings, Main Entrance railing on left side (ascending)   Transportation Anticipated car, drives self   Living Environment Comments daughters able to help in evening and weekends   Self-Care   Usual Activity Tolerance moderate   Current Activity Tolerance fair   Regular Exercise No   Equipment Currently Used at Home cane, straight   Activity/Exercise/Self-Care Comment not consistent with exercise, does ascend/descend stairs (13) to basement x 2 for exercise daily   Disability/Function   Fall history within last six months no   General Information   Onset of Illness/Injury or Date of Surgery 10/11/21   Referring Physician Misti Sanon MD   Patient/Family Therapy Goals Statement (PT) return home, able to take care of spouse safely, go down into basement   Pertinent History of Current Problem (include personal factors and/or comorbidities that impact the POC) patient reports notable weakness and fatigue 2 weeks prior to hospitalization   Existing Precautions/Restrictions no known precautions/restrictions   General Observations patient is unsteady without device and uses furniture to walk in room   Cognition   Orientation Status (Cognition) oriented x 3   Pain Assessment   Patient Currently in Pain No   Integumentary/Edema   Integumentary/Edema other (describe)   Integumentary/Edema Comments as per other clinician notes swelling in area affected    Posture    Posture Forward head position;Protracted shoulders   Posture Comments poor posture interfering with proper gait pattern and increasing risk of falls   Bed Mobility   Comment (Bed Mobility) patient reports he is at baseline with bedmobility no  concerns   Transfers   Transfers sit-stand transfer   Transfer Safety Concerns Noted decreased balance during turns;decreased proprioception;decreased sequencing ability   Impairments Contributing to Impaired Transfers impaired balance;decreased flexibility;decreased sensation;impaired sensory feedback;decreased strength   Transfer Safety Comments patient requires further training regarding safe and efficient transfers due to poor set up and sequencing, compensation with UE and LE bracing against chair   Sit-Stand Transfer   Sit/Stand Transfer Comments see above   Gait/Stairs (Locomotion)   Clarke Level (Gait) contact guard   Assistive Device (Gait) cane, straight   Distance in Feet (Required for LE Total Joints) 125' x 1, 75' x 1, 200' x 1   Pattern (Gait) step-through   Deviations/Abnormal Patterns (Gait) bilateral deviations;oscar decreased;gait speed decreased;stride length decreased;weight shifting decreased  (poor device placement)   Maintains Weight-bearing Status (Gait) able to maintain   Comment (Gait/Stairs) patient reports 4 steps to enter home and 13 stairs down to basement, typically goes to basement 2 times daily however no necessities downstairs, patient also reports he has been ascending stairs step over step grabbing onto L sided railing with both hands, descends side stepping in step to pattern   Balance   Balance Comments patient is unsteady on feet requires device, neuropathy affecting   Sensory Examination   Sensory Perception Comments nueropathy bilateral LE & UE   Clinical Impression   Criteria for Skilled Therapeutic Intervention yes, treatment indicated   PT Diagnosis (PT) unsteady gait, fall risk   Influenced by the following impairments poor endurance of strength, imbalance   Functional limitations due to impairments difficulty with sit to stand transfers, ascending/descending stairs, ambulation   Clinical Presentation Stable/Uncomplicated   Clinical Presentation Rationale has  improved since admission   Clinical Decision Making (Complexity) moderate complexity   Therapy Frequency (PT) Daily   Predicted Duration of Therapy Intervention (days/wks) 4 days   Planned Therapy Interventions (PT) balance training;gait training;home exercise program;patient/family education;postural re-education;stair training;strengthening;transfer training;progressive activity/exercise;risk factor education;home program guidelines   Anticipated Equipment Needs at Discharge (PT) cane, straight  (per report has at home)   Risk & Benefits of therapy have been explained evaluation/treatment results reviewed;care plan/treatment goals reviewed;risks/benefits reviewed;participants voiced agreement with care plan;participants included;patient   PT Discharge Planning    PT Discharge Recommendation (DC Rec) home with home care physical therapy   PT Rationale for DC Rec patient would benefit from assistance caring for spouse due to level of fatigue   PT Brief overview of current status  Patient requires training for proper and safe use of device and completion of stairs, patient gatigue quickly causing imbalance and unsteady gait   Total Evaluation Time   Total Evaluation Time (Minutes) 12   Cammie Plasencia PT

## 2021-10-13 NOTE — PLAN OF CARE
Pt is in good spirits but reports he is bored.  The redness in the left arm and hand is stable, not increasing and not decreasing.  He is on telemetry which is indicating occasional PACs and sinus rhythm around 60 - 70 BPM.    Problem: Adult Inpatient Plan of Care  Goal: Plan of Care Review  Outcome: Improving  Flowsheets (Taken 10/12/2021 1918)  Plan of Care Reviewed With: patient  Progress: improving     Problem: Adult Inpatient Plan of Care  Goal: Absence of Hospital-Acquired Illness or Injury  Intervention: Identify and Manage Fall Risk  Recent Flowsheet Documentation  Taken 10/12/2021 1741 by Kristal Carter, RN  Safety Promotion/Fall Prevention: activity supervised     Problem: Skin or Soft Tissue Infection  Goal: Infection Symptom Resolution  Outcome: No Change

## 2021-10-13 NOTE — PROGRESS NOTES
Pt was practicing [employer]ism until mid adulthood. He rejects the judgmental and exclusive teachings of formal Catholicism. Pt believes solidly in a Higher Power/God that is loving and forgiving. Pt finds insight and peace through fanny.    Pt and writer discussed various opinions about the Holiness and about God. Pt expressed surprise and appreciation in finding a  that modeled and affirmed his outlook. Writer provided empathetic listening, some Mormon education, validation of feelings and affirmation of person. Pt relayed that he had never before spoken about his fanny or experiences to this extent. He has before been  met with judgement.    Chaplain SELIN PerezDiv

## 2021-10-13 NOTE — PLAN OF CARE
Patient up in chair, ambulated in [place] with PT--tolerated well. Taking fluids well, good appetite. Denies pain. Left arm is red, but per patient the swelling has decreased quite a bit. Up independently in room. VSS.

## 2021-10-13 NOTE — PLAN OF CARE
Problem: Adult Inpatient Plan of Care  Goal: Plan of Care Review  Outcome: Improving  Goal: Patient-Specific Goal (Individualized)  Outcome: Improving  Goal: Absence of Hospital-Acquired Illness or Injury  Outcome: Improving  Intervention: Prevent Skin Injury  Recent Flowsheet Documentation  Taken 10/13/2021 0020 by Aminah Anne RN  Body Position:   position changed independently   upper extremity elevated  Goal: Optimal Comfort and Wellbeing  Outcome: Improving  Goal: Readiness for Transition of Care  Outcome: Improving     Problem: Risk for Delirium  Goal: Optimal Coping  Outcome: Improving  Goal: Improved Behavioral Control  Outcome: Improving  Goal: Improved Attention and Thought Clarity  Outcome: Improving  Goal: Improved Sleep  Outcome: Improving     Problem: Skin or Soft Tissue Infection  Goal: Infection Symptom Resolution  Outcome: Improving   Pt a/o without pain while at rest with lue elevated on a pillow. Red rash area within marked space. Will continue to monitor.

## 2021-10-13 NOTE — PROGRESS NOTES
[facility]    Medicine Progress Note - Hospitalist Service       Date of Admission:  10/11/2021    Assessment & Plan              Reggie Gomes is a 76 year old male admitted on 10/11/202 with cellulitis causing early sepsis involving left arm    1.  Sepsis, left upper extremity cellulitis  ---resolving, has not spread further than the demarcated area  --- has clear delineated hyperemic area  ---no clear scratches  --- Rash distinctive and more suggestive of erysipelas given papular coalesced eruption  --- Changed antibiotics to ceftriaxone IV; was given vancomycin and azithromycin initially   ---transition to clindamycin po for discahrge  ---No recent trauma to the left upper extremity   --- Normal CK level   ---bc negative   ---keep arm elevated  ---discontinue tele    2. Generalized weakness,  --- Suspect related to sepsis infection  ---Patient slid off the couch and felt generally weak but denies head trauma or loss of consciousness  ---He has right foot drop after cervical spine surgery 5 years ago  --- PT recommends home with home pt  ---Fall precautions    3. Subacute Cough  ---Ongoing for about 6 months  ---Chest x-ray negative  ---continue to hold lisinopril for now and monitor    4.  Urinary incontinence  ---History of prostate and bladder cancer  ---Continue Flomax 0.8 mg daily    5.  Coronary artery disease  ---History of stent  ---Continue aspirin, atorvastatin (if CK level not elevated) and atenolol    6.  Essential hypertension  ---bp and bmp stable  ---Continue atenolol, holding lisinopril    7.  Chronic low back pain  Continue Flexeril, duloxetine, gabapentin    8.  GERD  Continue omeprazole       Diet: Combination Diet Low Saturated Fat Na <2400mg Diet    DVT Prophylaxis: Enoxaparin (Lovenox) SQ  Ferrer Catheter: Not present  Central Lines: None  Code Status: Full Code      Disposition Plan   Expected discharge: 10/14/2021 pending improvement in rash    The patient's care  was discussed with the Bedside Nurse and Care Coordinator/.    Hailee Lackey MD  Hospitalist Service  [facility]  Securely message with the Vocera Web Console (learn more here)  Text page via [employer] Paging/Directory                    ______________________________________________________________________    Interval History   Patient seen and chart reviewed. Care discussed with the nursing staff as well.     He is able to tell me that the symptoms came up very quickly. He does not remember his cat scratching him but he has had cat scratches on occasion. He remembers the symptoms being just centered on his arm starting Sunday to the point where he thought he had a sunburn. Rapid progression to include his hand and up to his elbow. He had a fever here. His main concern was weakness and he fell and was unable to get himself up at home. 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 [place] to have sepsis from cellulitis    Review of systems; no diarrhea, no chest pain, no new drugs. No rashes previous to this. Also denies dysuria increased frequency or hematuria or other rashes    Data reviewed today: I reviewed all medications, new labs and imaging results over the last 24 hours. I personally reviewed no images or EKG's today.    Physical Exam   Vital Signs: Temp: 98  F (36.7  C) Temp src: Oral BP: 100/65 Pulse: 74   Resp: 24 SpO2: 95 % O2 Device: None (Room air)    Weight: 220 lbs .31 oz  General Appearance: Was conversational, no apparent distress  Respiratory: Clear to auscultation bilaterally  Cardiovascular: Regular rate and rhythm without significant murmurs rubs or gallops  GI: Soft, nontender without hepatosplenomegaly  Skin: He has a papular coalescent eruption starting with swelling involving the dorsum of his left hand and extending proximately to his elbow. He has erythematous demarcation of the almost purpuric  papular eruption that is just mildly tender more in the hand. It then changes appearance to be more darker in appearance over the arm, however leading edge of rash which is approximately 1 cm is more erythematous. Nonscaly. Nonpustular. Skin intact. No scratches that I can appreciate.  Other: Trace to 1+ lower extremity    Data     No results found for this or any previous visit (from the past 24 hour(s)).

## 2021-10-14 ENCOUNTER — APPOINTMENT (OUTPATIENT)
Dept: PHYSICAL THERAPY | Facility: HOSPITAL | Age: 76
DRG: 872 | End: 2021-10-14
Payer: MEDICARE

## 2021-10-14 VITALS
TEMPERATURE: 98.3 F | SYSTOLIC BLOOD PRESSURE: 165 MMHG | HEART RATE: 77 BPM | DIASTOLIC BLOOD PRESSURE: 96 MMHG | BODY MASS INDEX: 35.36 KG/M2 | WEIGHT: 220.02 LBS | RESPIRATION RATE: 20 BRPM | HEIGHT: 66 IN | OXYGEN SATURATION: 95 %

## 2021-10-14 LAB
BASOPHILS # BLD AUTO: 0 10E3/UL (ref 0–0.2)
BASOPHILS NFR BLD AUTO: 1 %
CK SERPL-CCNC: 168 U/L (ref 30–190)
CREAT SERPL-MCNC: 0.79 MG/DL (ref 0.7–1.3)
EOSINOPHIL # BLD AUTO: 0.2 10E3/UL (ref 0–0.7)
EOSINOPHIL NFR BLD AUTO: 4 %
ERYTHROCYTE [DISTWIDTH] IN BLOOD BY AUTOMATED COUNT: 12.9 % (ref 10–15)
GFR SERPL CREATININE-BSD FRML MDRD: 88 ML/MIN/1.73M2
HCT VFR BLD AUTO: 33.5 % (ref 40–53)
HGB BLD-MCNC: 11 G/DL (ref 13.3–17.7)
IMM GRANULOCYTES # BLD: 0 10E3/UL
IMM GRANULOCYTES NFR BLD: 1 %
LYMPHOCYTES # BLD AUTO: 1.2 10E3/UL (ref 0.8–5.3)
LYMPHOCYTES NFR BLD AUTO: 18 %
MCH RBC QN AUTO: 34.4 PG (ref 26.5–33)
MCHC RBC AUTO-ENTMCNC: 32.8 G/DL (ref 31.5–36.5)
MCV RBC AUTO: 105 FL (ref 78–100)
MONOCYTES # BLD AUTO: 0.4 10E3/UL (ref 0–1.3)
MONOCYTES NFR BLD AUTO: 7 %
NEUTROPHILS # BLD AUTO: 4.7 10E3/UL (ref 1.6–8.3)
NEUTROPHILS NFR BLD AUTO: 69 %
NRBC # BLD AUTO: 0 10E3/UL
NRBC BLD AUTO-RTO: 0 /100
PLATELET # BLD AUTO: 125 10E3/UL (ref 150–450)
PLATELET # BLD AUTO: 138 10E3/UL (ref 150–450)
RBC # BLD AUTO: 3.2 10E6/UL (ref 4.4–5.9)
WBC # BLD AUTO: 6.6 10E3/UL (ref 4–11)

## 2021-10-14 PROCEDURE — 99239 HOSP IP/OBS DSCHRG MGMT >30: CPT | Performed by: FAMILY MEDICINE

## 2021-10-14 PROCEDURE — 250N000013 HC RX MED GY IP 250 OP 250 PS 637: Performed by: FAMILY MEDICINE

## 2021-10-14 PROCEDURE — 85025 COMPLETE CBC W/AUTO DIFF WBC: CPT | Performed by: STUDENT IN AN ORGANIZED HEALTH CARE EDUCATION/TRAINING PROGRAM

## 2021-10-14 PROCEDURE — 82550 ASSAY OF CK (CPK): CPT | Performed by: FAMILY MEDICINE

## 2021-10-14 PROCEDURE — 85049 AUTOMATED PLATELET COUNT: CPT | Performed by: HOSPITALIST

## 2021-10-14 PROCEDURE — 36415 COLL VENOUS BLD VENIPUNCTURE: CPT | Performed by: STUDENT IN AN ORGANIZED HEALTH CARE EDUCATION/TRAINING PROGRAM

## 2021-10-14 PROCEDURE — 250N000013 HC RX MED GY IP 250 OP 250 PS 637: Performed by: HOSPITALIST

## 2021-10-14 PROCEDURE — 99207 PR APP CREDIT; MD BILLING SHARED VISIT: CPT | Performed by: STUDENT IN AN ORGANIZED HEALTH CARE EDUCATION/TRAINING PROGRAM

## 2021-10-14 PROCEDURE — 97116 GAIT TRAINING THERAPY: CPT | Mod: GP

## 2021-10-14 PROCEDURE — 82565 ASSAY OF CREATININE: CPT | Performed by: HOSPITALIST

## 2021-10-14 PROCEDURE — 36415 COLL VENOUS BLD VENIPUNCTURE: CPT | Performed by: FAMILY MEDICINE

## 2021-10-14 RX ORDER — LIDOCAINE 40 MG/G
CREAM TOPICAL
Qty: 5 G | Refills: 0 | Status: CANCELLED | OUTPATIENT
Start: 2021-10-14

## 2021-10-14 RX ORDER — CLINDAMYCIN HCL 300 MG
300 CAPSULE ORAL EVERY 8 HOURS
Qty: 30 CAPSULE | Refills: 0 | Status: SHIPPED | OUTPATIENT
Start: 2021-10-14 | End: 2021-10-24

## 2021-10-14 RX ORDER — ACETAMINOPHEN 500 MG
1000 TABLET ORAL EVERY 8 HOURS PRN
Start: 2021-10-14

## 2021-10-14 RX ADMIN — TAMSULOSIN HYDROCHLORIDE 0.8 MG: 0.4 CAPSULE ORAL at 08:08

## 2021-10-14 RX ADMIN — PANTOPRAZOLE SODIUM 40 MG: 20 TABLET, DELAYED RELEASE ORAL at 06:41

## 2021-10-14 RX ADMIN — Medication 50 MG: at 08:07

## 2021-10-14 RX ADMIN — CLINDAMYCIN HYDROCHLORIDE 300 MG: 150 CAPSULE ORAL at 06:41

## 2021-10-14 RX ADMIN — CYANOCOBALAMIN TAB 1000 MCG 1000 MCG: 1000 TAB at 08:08

## 2021-10-14 RX ADMIN — DULOXETINE HYDROCHLORIDE 60 MG: 60 CAPSULE, DELAYED RELEASE ORAL at 08:07

## 2021-10-14 RX ADMIN — ATORVASTATIN CALCIUM 40 MG: 40 TABLET, FILM COATED ORAL at 08:08

## 2021-10-14 RX ADMIN — ATENOLOL 25 MG: 25 TABLET ORAL at 08:07

## 2021-10-14 RX ADMIN — ASPIRIN 81 MG CHEWABLE TABLET 81 MG: 81 TABLET CHEWABLE at 08:08

## 2021-10-14 RX ADMIN — Medication 500 MG: at 08:07

## 2021-10-14 RX ADMIN — Medication 2.08 G: at 08:07

## 2021-10-14 RX ADMIN — GABAPENTIN 300 MG: 300 CAPSULE ORAL at 08:15

## 2021-10-14 NOTE — DISCHARGE SUMMARY
[facility]  Hospitalist Discharge Summary      Date of Admission:  10/11/2021  Date of Discharge:  10/14/2021  1:00 PM  Discharging Provider: Misti Sanon MD      Discharge Diagnoses       Sepsis, resolved.    Left arm cellulitis/erysipelas; improved.  Likely cause of above    BPH/previous prostate and bladder cancer    Coronary artery disease    Essential hypertension    Chronic low back pain    GERD    Follow-ups Needed After Discharge   Follow-up Appointments     Follow-up and recommended labs and tests       Will continue oral antibiotics for 10 more days . Last of day antibiotic   will be 10/23/2021         {  I advised follow-up with primary care until resolution of rash.  Initial follow-up recommended in 5 to 7 days    Unresulted Labs Ordered in the Past 30 Days of this Admission     Date and Time Order Name Status Description    10/11/2021  6:21 PM Blood Culture Hand, Right Preliminary       T blood cultures no growth to date    Discharge Disposition   Discharged to home home PT recommended  Condition at discharge: Stable      Hospital Course            Reggie Gomes is a 76 year old male admitted on 10/11/202 with cellulitis causing early sepsis involving left arm    1.  Sepsis,   --- Secondary to erysipelas/left upper extremity cellulitis  ---resolving, has not spread further than the demarcated area  --- has clear delineated hyperemic area  ---no clear scratches  --- Rash distinctive and more suggestive of erysipelas given papular coalesced eruption  --- Changed antibiotics to ceftriaxone IV; was given vancomycin and azithromycin in ed  ---transition to clindamycin po for discahrge  ---No recent trauma to the left upper extremity   --- Normal CK level   ---bc negative   ---keep arm elevated    2. Generalized weakness,  --- Suspect related to sepsis infection  ---Patient slid off the couch and felt generally weak but denies head trauma or loss of consciousness  ---He has right  foot drop after cervical spine surgery 5 years ago  --- PT recommends home with home pt  ---Fall precautions    3. Subacute Cough  ---Ongoing for about 6 months  ---Chest x-ray negative  --- Potentially related to lisinopril.  Outpatient follow-up recommended    4.  Urinary incontinence  ---History of prostate and bladder cancer  ---Continue Flomax 0.8 mg daily    5.  Coronary artery disease  ---History of stent  ---Continue aspirin, atorvastatin (if CK level not elevated) and atenolol    6.  Essential hypertension  ---bp and bmp stable  ---Continue atenolol, holding lisinopril    7.  Chronic low back pain  Continue Flexeril, duloxetine, gabapentin    8.  GERD  Continue omeprazole      Consultations This Hospital Stay   PHARMACY TO DOSE VANCO  CARE MANAGEMENT / SOCIAL WORK IP CONSULT  SOCIAL WORK IP CONSULT  PHYSICAL THERAPY ADULT IP CONSULT    Code Status   Prior    Time Spent on this Encounter   I, Misti Sanon MD, personally saw the patient today and spent greater than 30 minutes discharging this patient.       Misti Sanon MD  [place] [id number]  Phone: [phone]  Fax: [phone]  ______________________________________________________________________    Physical Exam   Vital Signs: Temp: 98.3  F (36.8  C) Temp src: Oral BP: (!) 165/96 Pulse: 77   Resp: 20 SpO2: 95 % O2 Device: None (Room air)    Weight: 220 lbs .31 oz  General Appearance: Pleasant, no apparent distress  Respiratory: Clear to auscultation bilaterally  Cardiovascular: Regular rate and rhythm  GI: Soft and nontender without hepatosplenomegaly  Skin: Clear papular coalesced rash involving the left arm.  Somewhat raised and swollen but improved from discharge.  Within demarcated line.  No clear pustules.  Other: Illogically grossly intact without focal deficits appreciated       Primary Care Physician   Marlon Mae    Discharge Orders      Home Care PT Referral for Hospital  Discharge      Follow-up and recommended labs and tests     Will continue oral antibiotics for 10 more days . Last of day antibiotic will be 10/23/2021     Activity    As tolerated     Reason for your hospital stay    Admitted for management of left arm cellulitis with systemic symptoms     Diet    2 gm sodium diet       Significant Results and Procedures   Most Recent 3 CBC's:Recent Labs   Lab Test 10/14/21  0529 10/14/21  0108 10/12/21  0528 10/11/21  1742 10/11/21  1742   WBC  --  6.6 9.4  --  11.2*   HGB  --  11.0* 11.6*  --  12.8*   MCV  --  105* 107*  --  104*   * 138* 122*   < > 133*    < > = values in this interval not displayed.     Most Recent 3 BMP's:Recent Labs   Lab Test 10/14/21  0529 10/12/21  0528 10/11/21  1742 06/20/21  0353 06/20/21  0353   NA  --  141 138  --  143   POTASSIUM  --  4.0 3.7  --  4.3   CHLORIDE  --  107 105  --  109*   CO2  --  26 23  --  26   BUN  --  10 12  --  13   CR 0.79 0.84 0.89   < > 0.81   ANIONGAP  --  8 10  --  8   NEDA  --  8.6 8.9  --  8.7   GLC  --  105 125  --  111    < > = values in this interval not displayed.   ,   Results for orders placed or performed during the hospital encounter of 10/11/21   XR Chest Port 1 View    Narrative    EXAM: XR CHEST PORT 1 VIEW  LOCATION: [facility]  DATE/TIME: 10/11/2021 6:32 PM    INDICATION: Fever  COMPARISON: None.      Impression    IMPRESSION: Heart size within normal limits for portable technique. Pulmonary vascularity normal. Elevated right hemidiaphragm and small granuloma right base. Lungs otherwise clear.       Discharge Medications   Discharge Medication List as of 10/14/2021 11:23 AM      START taking these medications    Details   clindamycin (CLEOCIN) 300 MG capsule Take 1 capsule (300 mg) by mouth every 8 hours for 10 days, Disp-30 capsule, R-0, E-Prescribe         CONTINUE these medications which have CHANGED    Details   acetaminophen (TYLENOL) 500 MG tablet Take 2 tablets (1,000 mg)  by mouth every 8 hours as needed for mild pain, No Print Out         CONTINUE these medications which have NOT CHANGED    Details   aspirin (ASA) 81 MG chewable tablet Take 81 mg by mouth daily, Historical      atenolol (TENORMIN) 25 MG tablet Take 25 mg by mouth daily, Historical      atorvastatin (LIPITOR) 40 MG tablet [ATORVASTATIN (LIPITOR) 40 MG TABLET] Take 40 mg by mouth every morning., Historical      CALCIUM CARBONATE (CALCIUM 500 ORAL) [CALCIUM CARBONATE (CALCIUM 500 ORAL)] Take 1 tablet by mouth daily., Historical      cyclobenzaprine (FLEXERIL) 5 MG tablet [CYCLOBENZAPRINE (FLEXERIL) 5 MG TABLET] Take 5 mg by mouth daily as needed for muscle spasms., Historical      DULoxetine (CYMBALTA) 60 MG capsule Take 60 mg by mouth 2 times daily, Historical      gabapentin (NEURONTIN) 300 MG capsule Take 300 mg by mouth 2 times daily Bid at noon and hs, Historical      lisinopril (PRINIVIL,ZESTRIL) 5 MG tablet [LISINOPRIL (PRINIVIL,ZESTRIL) 5 MG TABLET] Take 1 tablet (5 mg total) by mouth daily., Disp-90 tablet, R-0, NormalDue to see Dr. Caldwell      omeprazole (PRILOSEC) 20 MG capsule [OMEPRAZOLE (PRILOSEC) 20 MG CAPSULE] Take 20 mg by mouth daily., Historical      psyllium (METAMUCIL) 0.52 gram capsule Take 2.08 g by mouth daily 4 caps qday, Historical      tamsulosin (FLOMAX) 0.4 mg Cp24 [TAMSULOSIN (FLOMAX) 0.4 MG CP24] Take 1 capsule (0.4 mg total) by mouth 2 (two) times a day., Disp-180 capsule, R-4, Normal      thiamine 50 MG TABS Take 50 mg by mouth daily Not sure of dose at this time., Historical      vitamin B-12 (CYANOCOBALAMIN) 1000 MCG tablet Take 1,000 mcg by mouth daily, Historical           Allergies   No Known Allergies

## 2021-10-14 NOTE — PROGRESS NOTES
Pt is alert and oriented x 4. Complained of tingling on his fingers and feet. That gets worsen when gabapentin is not taken on time. Pt will prefer to maintain his home routine in other to avoid having any symptoms between treatment intervals.  . Pharmacy was notified. See scheduled changes.

## 2021-10-14 NOTE — PLAN OF CARE
Physical Therapy Discharge Summary    Reason for therapy discharge:    Discharged to home.    Progress towards therapy goal(s). See goals on Care Plan in [facility] electronic health record for goal details.  Goals met    Therapy recommendation(s):    No further therapy is recommended.patient appears close to baseline and has a SEC at home. Reports he does help his wife, but other family can help as needed.

## 2021-10-14 NOTE — PLAN OF CARE
Problem: Skin or Soft Tissue Infection  Goal: Infection Symptom Resolution  Outcome: Adequate for Discharge   Patient on oral antibiotics, and ready to discharge home per MD. Discharge instructions gone over with patient including medication instruction and follow up. Patient verbalized understanding, family to transport.

## 2021-10-14 NOTE — DISCHARGE SUMMARY
[facility] MEDICINE  DISCHARGE SUMMARY     Primary Care Physician: Marlon Mae  Admission Date: 10/11/2021   Discharge Provider: Hailee Lackey MD Discharge Date: 10/14/2021   Diet:   Active Diet and Nourishment Order   Procedures     Combination Diet Low Saturated Fat Na <2400mg Diet     Diet       Code Status: Full Code   Activity: DCACTIVITY: Activity as tolerated        Condition at Discharge: Stable     REASON FOR PRESENTATION(See Admission Note for Details)   Patient admitted with pain and redness of left arm dorsal aspect    PRINCIPAL & ACTIVE DISCHARGE DIAGNOSES     Principal Problem:    Cellulitis of left upper extremity  Active Problems:    Coronary Artery Disease    Cough      PENDING LABS     Unresulted Labs Ordered in the Past 30 Days of this Admission     Date and Time Order Name Status Description    10/11/2021  6:21 PM Blood Culture Hand, Right Preliminary             PROCEDURES ( this hospitalization only)          RECOMMENDATIONS TO OUTPATIENT PROVIDER FOR F/U VISIT     Follow-up Appointments     Follow-up and recommended labs and tests       Will continue oral antibiotics for 10 more days . Last of day antibiotic   will be 10/23/2021                 DISPOSITION     Home with home PT    SUMMARY OF HOSPITAL COURSE:      Eliseo Paredes is 76 YOM with Pmhx of CAD, HTN, admitted to hospital on 10/12/2021 after presenting with sudden onset of reddening, pain and swelling of left dorsal hand. Patient had a low grade fever at presentation. He also resented with generalized body weakness. Patient was treated with Iv antibiotics which was subsequently changed to oral . Patient tolerated and responded to medications rendered. Patient is currently hemodynamically stable to be discharged home today. Clindamycin for 10 days has been sent to pharmacy.     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      CALCIUM CARBONATE (CALCIUM 500 ORAL) [CALCIUM CARBONATE (CALCIUM 500 ORAL)] Take 1 tablet by mouth daily.      cyclobenzaprine (FLEXERIL) 5 MG tablet [CYCLOBENZAPRINE (FLEXERIL) 5 MG TABLET] Take 5 mg by mouth daily as needed for muscle spasms.      DULoxetine (CYMBALTA) 60 MG capsule Take 60 mg by mouth 2 times daily      gabapentin (NEURONTIN) 300 MG capsule Take 300 mg by mouth 2 times daily Bid at noon and hs      lisinopril (PRINIVIL,ZESTRIL) 5 MG tablet [LISINOPRIL (PRINIVIL,ZESTRIL) 5 MG TABLET] Take 1 tablet (5 mg total) by mouth daily.  Qty: 90 tablet, Refills: 0    Comments: Due to see Dr. Caldwell  Associated Diagnoses: CAD (coronary artery disease)      omeprazole (PRILOSEC) 20 MG capsule [OMEPRAZOLE (PRILOSEC) 20 MG CAPSULE] Take 20 mg by mouth daily.      psyllium (METAMUCIL) 0.52 gram capsule Take 2.08 g by mouth daily 4 caps qday      tamsulosin (FLOMAX) 0.4 mg Cp24 [TAMSULOSIN (FLOMAX) 0.4 MG CP24] Take 1 capsule (0.4 mg total) by mouth 2 (two) times a day.  Qty: 180 capsule, Refills: 4    Associated Diagnoses: Prostate cancer (H)      thiamine 50 MG TABS Take 50 mg by mouth daily Not sure of dose at this time.      vitamin B-12  (CYANOCOBALAMIN) 1000 MCG tablet Take 1,000 mcg by mouth daily                   Rationale for medication changes:            Consults       Immunizations given this encounter     Most Recent Immunizations   Administered Date(s) Administered     COVID-19,PF,Pfizer 03/25/2021     Influenza (High Dose) 3 valent vaccine 09/06/2021           Anticoagulation Information      Recent INR results:   Recent Labs   Lab 10/11/21  1742   INR 1.16*     Warfarin doses (if applicable) or name of other anticoagulant:       SIGNIFICANT IMAGING FINDINGS     Results for orders placed or performed during the hospital encounter of 10/11/21   XR Chest Port 1 View    Impression    IMPRESSION: Heart size within normal limits for portable technique. Pulmonary vascularity normal. Elevated right hemidiaphragm and small granuloma right base. Lungs otherwise clear.       SIGNIFICANT LABORATORY FINDINGS     Most Recent 3 CBC's:Recent Labs   Lab Test 10/14/21  0529 10/14/21  0108 10/12/21  0528 10/11/21  1742 10/11/21  1742   WBC  --  6.6 9.4  --  11.2*   HGB  --  11.0* 11.6*  --  12.8*   MCV  --  105* 107*  --  104*   * 138* 122*   < > 133*    < > = values in this interval not displayed.           Discharge Orders        Home Care PT Referral for Hospital Discharge      Follow-up and recommended labs and tests     Will continue oral antibiotics for 10 more days . Last of day antibiotic will be 10/23/2021     Activity    As tolerated     Reason for your hospital stay    Admitted for management of left arm cellulitis with systemic symptoms     Diet    2 gm sodium diet       Examination   Physical Exam   Temp:  [97.8  F (36.6  C)-98.4  F (36.9  C)] 98.3  F (36.8  C)  Pulse:  [64-77] 77  Resp:  [20-26] 20  BP: (115-165)/(69-96) 165/96  SpO2:  [95 %-96 %] 95 %  Wt Readings from Last 1 Encounters:   10/12/21 99.8 kg (220 lb 0.3 oz)       General Appearance:  Was conversational, no apparent distress  Respiratory: Clear to auscultation  bilaterally  Cardiovascular: Regular rate and rhythm without significant murmurs rubs or gallops  GI: Soft, nontender without hepatosplenomegaly  Skin: He has a papular coalescent eruption starting with swelling involving the dorsum of his left hand and extending proximately to his elbow. He has erythematous demarcation of the almost purpuric papular eruption that is just mildly tender more in the hand. It then changes appearance to be more darker in appearance over the arm  Other:  Trace to 1+ lower extremity      Please see EMR for more detailed significant labs, imaging, consultant notes etc.    IHailee MD, personally saw the patient today and spent greater than or equal to 30 minutes discharging this patient.    Hailee Lackey MD  [facility]    CC:Marlon Mae

## 2021-10-14 NOTE — PLAN OF CARE
Problem: Adult Inpatient Plan of Care  Goal: Plan of Care Review  Outcome: Improving  Goal: Patient-Specific Goal (Individualized)  Outcome: Improving  Goal: Absence of Hospital-Acquired Illness or Injury  Outcome: Improving  Intervention: Identify and Manage Fall Risk  Recent Flowsheet Documentation  Taken 10/14/2021 0150 by Aminah Anne, RN  Safety Promotion/Fall Prevention: mobility aid in reach  Intervention: Prevent Skin Injury  Recent Flowsheet Documentation  Taken 10/14/2021 0150 by Aminah Anne, RN  Body Position: position changed independently  Goal: Optimal Comfort and Wellbeing  Outcome: Improving  Goal: Readiness for Transition of Care  Outcome: Improving     Problem: Risk for Delirium  Goal: Optimal Coping  Outcome: Improving  Goal: Improved Behavioral Control  Outcome: Improving  Goal: Improved Attention and Thought Clarity  Outcome: Improving  Goal: Improved Sleep  Outcome: Improving     Problem: Skin or Soft Tissue Infection  Goal: Infection Symptom Resolution  Outcome: Improving     Pt a/o with VSS. Left upper extremity improving on redness and swelling. Po antibiotics starting. Will monitor.

## 2021-10-17 LAB — BACTERIA BLD CULT: NO GROWTH

## 2022-09-26 ENCOUNTER — HOSPITAL ENCOUNTER (INPATIENT)
Facility: HOSPITAL | Age: 77
LOS: 1 days | Discharge: HOME HEALTH | DRG: 690 | End: 2022-09-29
Attending: EMERGENCY MEDICINE | Admitting: HOSPITALIST
Payer: MEDICARE

## 2022-09-26 ENCOUNTER — APPOINTMENT (OUTPATIENT)
Dept: CT IMAGING | Facility: HOSPITAL | Age: 77
DRG: 690 | End: 2022-09-26
Attending: EMERGENCY MEDICINE
Payer: MEDICARE

## 2022-09-26 ENCOUNTER — APPOINTMENT (OUTPATIENT)
Dept: MRI IMAGING | Facility: HOSPITAL | Age: 77
DRG: 690 | End: 2022-09-26
Attending: EMERGENCY MEDICINE
Payer: MEDICARE
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
## 2022-09-26 DIAGNOSIS — R23.9 ALTERATION IN SKIN INTEGRITY DUE TO MOISTURE: ICD-10-CM

## 2022-09-26 DIAGNOSIS — C61 PROSTATE CANCER (H): ICD-10-CM

## 2022-09-26 DIAGNOSIS — M48.061 SPINAL STENOSIS OF LUMBAR REGION, UNSPECIFIED WHETHER NEUROGENIC CLAUDICATION PRESENT: ICD-10-CM

## 2022-09-26 DIAGNOSIS — Z97.8 FOLEY CATHETER IN PLACE: ICD-10-CM

## 2022-09-26 DIAGNOSIS — B96.20 E COLI BACTEREMIA: Primary | ICD-10-CM

## 2022-09-26 DIAGNOSIS — R50.9 FEVER, UNSPECIFIED FEVER CAUSE: ICD-10-CM

## 2022-09-26 DIAGNOSIS — R78.81 E COLI BACTEREMIA: Primary | ICD-10-CM

## 2022-09-26 PROBLEM — M54.16 LUMBAR RADICULOPATHY: Status: ACTIVE | Noted: 2019-11-21

## 2022-09-26 PROBLEM — N30.00 ACUTE CYSTITIS WITHOUT HEMATURIA: Status: ACTIVE | Noted: 2022-09-26

## 2022-09-26 PROBLEM — J20.8 ACUTE BRONCHITIS DUE TO OTHER SPECIFIED ORGANISMS: Status: ACTIVE | Noted: 2022-09-26

## 2022-09-26 PROBLEM — M21.371 RIGHT FOOT DROP: Status: ACTIVE | Noted: 2020-02-17

## 2022-09-26 PROBLEM — M62.81 GENERALIZED MUSCLE WEAKNESS: Status: ACTIVE | Noted: 2018-07-23

## 2022-09-26 LAB
ABO/RH(D): NORMAL
ALBUMIN SERPL BCG-MCNC: 4 G/DL (ref 3.5–5.2)
ALBUMIN UR-MCNC: 10 MG/DL
ALP SERPL-CCNC: 51 U/L (ref 40–129)
ALT SERPL W P-5'-P-CCNC: 21 U/L (ref 10–50)
ANION GAP SERPL CALCULATED.3IONS-SCNC: 11 MMOL/L (ref 7–15)
ANTIBODY SCREEN: NEGATIVE
APPEARANCE UR: CLEAR
AST SERPL W P-5'-P-CCNC: 19 U/L (ref 10–50)
BASOPHILS # BLD AUTO: 0 10E3/UL (ref 0–0.2)
BASOPHILS NFR BLD AUTO: 0 %
BILIRUB DIRECT SERPL-MCNC: 0.35 MG/DL (ref 0–0.3)
BILIRUB SERPL-MCNC: 1.9 MG/DL
BILIRUB UR QL STRIP: NEGATIVE
BUN SERPL-MCNC: 12.6 MG/DL (ref 8–23)
CALCIUM SERPL-MCNC: 8.9 MG/DL (ref 8.8–10.2)
CHLORIDE SERPL-SCNC: 100 MMOL/L (ref 98–107)
COLOR UR AUTO: ABNORMAL
CREAT SERPL-MCNC: 0.95 MG/DL (ref 0.67–1.17)
CRP SERPL-MCNC: 68.2 MG/L
DEPRECATED HCO3 PLAS-SCNC: 27 MMOL/L (ref 22–29)
EOSINOPHIL # BLD AUTO: 0 10E3/UL (ref 0–0.7)
EOSINOPHIL NFR BLD AUTO: 1 %
ERYTHROCYTE [DISTWIDTH] IN BLOOD BY AUTOMATED COUNT: 13.4 % (ref 10–15)
GFR SERPL CREATININE-BSD FRML MDRD: 83 ML/MIN/1.73M2
GLUCOSE SERPL-MCNC: 99 MG/DL (ref 70–99)
GLUCOSE UR STRIP-MCNC: NEGATIVE MG/DL
HCT VFR BLD AUTO: 39.5 % (ref 40–53)
HGB BLD-MCNC: 12.9 G/DL (ref 13.3–17.7)
HGB UR QL STRIP: NEGATIVE
IMM GRANULOCYTES # BLD: 0 10E3/UL
IMM GRANULOCYTES NFR BLD: 0 %
INR PPP: 1.1 (ref 0.85–1.15)
KETONES UR STRIP-MCNC: NEGATIVE MG/DL
LACTATE SERPL-SCNC: 1.3 MMOL/L (ref 0.7–2)
LEUKOCYTE ESTERASE UR QL STRIP: ABNORMAL
LIPASE SERPL-CCNC: 9 U/L (ref 13–60)
LYMPHOCYTES # BLD AUTO: 0.4 10E3/UL (ref 0.8–5.3)
LYMPHOCYTES NFR BLD AUTO: 8 %
MAGNESIUM SERPL-MCNC: 1.5 MG/DL (ref 1.7–2.3)
MCH RBC QN AUTO: 34.1 PG (ref 26.5–33)
MCHC RBC AUTO-ENTMCNC: 32.7 G/DL (ref 31.5–36.5)
MCV RBC AUTO: 105 FL (ref 78–100)
MONOCYTES # BLD AUTO: 0.4 10E3/UL (ref 0–1.3)
MONOCYTES NFR BLD AUTO: 7 %
NEUTROPHILS # BLD AUTO: 4.2 10E3/UL (ref 1.6–8.3)
NEUTROPHILS NFR BLD AUTO: 84 %
NITRATE UR QL: NEGATIVE
NRBC # BLD AUTO: 0 10E3/UL
NRBC BLD AUTO-RTO: 0 /100
PH UR STRIP: 5.5 [PH] (ref 5–7)
PHOSPHATE SERPL-MCNC: 2.3 MG/DL (ref 2.5–4.5)
PLATELET # BLD AUTO: 116 10E3/UL (ref 150–450)
POTASSIUM SERPL-SCNC: 4.3 MMOL/L (ref 3.4–5.3)
PROCALCITONIN SERPL IA-MCNC: 0.12 NG/ML
PROT SERPL-MCNC: 6.9 G/DL (ref 6.4–8.3)
RBC # BLD AUTO: 3.78 10E6/UL (ref 4.4–5.9)
RBC URINE: 1 /HPF
SARS-COV-2 RNA RESP QL NAA+PROBE: NEGATIVE
SODIUM SERPL-SCNC: 138 MMOL/L (ref 136–145)
SP GR UR STRIP: >1.05 (ref 1–1.03)
SPECIMEN EXPIRATION DATE: NORMAL
SQUAMOUS EPITHELIAL: 1 /HPF
TSH SERPL DL<=0.005 MIU/L-ACNC: 1.26 UIU/ML (ref 0.3–4.2)
UROBILINOGEN UR STRIP-MCNC: <2 MG/DL
WBC # BLD AUTO: 5 10E3/UL (ref 4–11)
WBC URINE: 23 /HPF

## 2022-09-26 PROCEDURE — 84145 PROCALCITONIN (PCT): CPT | Performed by: EMERGENCY MEDICINE

## 2022-09-26 PROCEDURE — 99285 EMERGENCY DEPT VISIT HI MDM: CPT | Mod: 25

## 2022-09-26 PROCEDURE — 81001 URINALYSIS AUTO W/SCOPE: CPT | Performed by: EMERGENCY MEDICINE

## 2022-09-26 PROCEDURE — 86140 C-REACTIVE PROTEIN: CPT | Performed by: EMERGENCY MEDICINE

## 2022-09-26 PROCEDURE — 83735 ASSAY OF MAGNESIUM: CPT | Performed by: EMERGENCY MEDICINE

## 2022-09-26 PROCEDURE — 36415 COLL VENOUS BLD VENIPUNCTURE: CPT | Performed by: EMERGENCY MEDICINE

## 2022-09-26 PROCEDURE — 258N000003 HC RX IP 258 OP 636: Performed by: EMERGENCY MEDICINE

## 2022-09-26 PROCEDURE — 85018 HEMOGLOBIN: CPT | Performed by: EMERGENCY MEDICINE

## 2022-09-26 PROCEDURE — 250N000013 HC RX MED GY IP 250 OP 250 PS 637: Performed by: EMERGENCY MEDICINE

## 2022-09-26 PROCEDURE — 96365 THER/PROPH/DIAG IV INF INIT: CPT

## 2022-09-26 PROCEDURE — G1010 CDSM STANSON: HCPCS

## 2022-09-26 PROCEDURE — 87149 DNA/RNA DIRECT PROBE: CPT | Performed by: EMERGENCY MEDICINE

## 2022-09-26 PROCEDURE — 96375 TX/PRO/DX INJ NEW DRUG ADDON: CPT

## 2022-09-26 PROCEDURE — 86901 BLOOD TYPING SEROLOGIC RH(D): CPT | Performed by: EMERGENCY MEDICINE

## 2022-09-26 PROCEDURE — 250N000011 HC RX IP 250 OP 636: Performed by: EMERGENCY MEDICINE

## 2022-09-26 PROCEDURE — 83690 ASSAY OF LIPASE: CPT | Performed by: EMERGENCY MEDICINE

## 2022-09-26 PROCEDURE — 85610 PROTHROMBIN TIME: CPT | Performed by: EMERGENCY MEDICINE

## 2022-09-26 PROCEDURE — 82248 BILIRUBIN DIRECT: CPT | Performed by: EMERGENCY MEDICINE

## 2022-09-26 PROCEDURE — U0003 INFECTIOUS AGENT DETECTION BY NUCLEIC ACID (DNA OR RNA); SEVERE ACUTE RESPIRATORY SYNDROME CORONAVIRUS 2 (SARS-COV-2) (CORONAVIRUS DISEASE [COVID-19]), AMPLIFIED PROBE TECHNIQUE, MAKING USE OF HIGH THROUGHPUT TECHNOLOGIES AS DESCRIBED BY CMS-2020-01-R: HCPCS | Performed by: EMERGENCY MEDICINE

## 2022-09-26 PROCEDURE — 86850 RBC ANTIBODY SCREEN: CPT | Performed by: EMERGENCY MEDICINE

## 2022-09-26 PROCEDURE — 93005 ELECTROCARDIOGRAM TRACING: CPT | Performed by: EMERGENCY MEDICINE

## 2022-09-26 PROCEDURE — 96361 HYDRATE IV INFUSION ADD-ON: CPT

## 2022-09-26 PROCEDURE — 99219 PR INITIAL OBSERVATION CARE,LEVEL II: CPT | Performed by: HOSPITALIST

## 2022-09-26 PROCEDURE — 80053 COMPREHEN METABOLIC PANEL: CPT | Performed by: EMERGENCY MEDICINE

## 2022-09-26 PROCEDURE — 87077 CULTURE AEROBIC IDENTIFY: CPT | Performed by: EMERGENCY MEDICINE

## 2022-09-26 PROCEDURE — 84443 ASSAY THYROID STIM HORMONE: CPT | Performed by: EMERGENCY MEDICINE

## 2022-09-26 PROCEDURE — 83605 ASSAY OF LACTIC ACID: CPT | Performed by: EMERGENCY MEDICINE

## 2022-09-26 PROCEDURE — 87086 URINE CULTURE/COLONY COUNT: CPT | Performed by: EMERGENCY MEDICINE

## 2022-09-26 PROCEDURE — G0378 HOSPITAL OBSERVATION PER HR: HCPCS

## 2022-09-26 PROCEDURE — C9803 HOPD COVID-19 SPEC COLLECT: HCPCS

## 2022-09-26 PROCEDURE — 96368 THER/DIAG CONCURRENT INF: CPT

## 2022-09-26 PROCEDURE — 84100 ASSAY OF PHOSPHORUS: CPT | Performed by: HOSPITALIST

## 2022-09-26 RX ORDER — MULTIPLE VITAMINS W/ MINERALS TAB 9MG-400MCG
1 TAB ORAL DAILY
Status: DISCONTINUED | OUTPATIENT
Start: 2022-09-27 | End: 2022-09-29 | Stop reason: HOSPADM

## 2022-09-26 RX ORDER — ATORVASTATIN CALCIUM 40 MG/1
40 TABLET, FILM COATED ORAL EVERY MORNING
Status: DISCONTINUED | OUTPATIENT
Start: 2022-09-26 | End: 2022-09-29 | Stop reason: HOSPADM

## 2022-09-26 RX ORDER — LORAZEPAM 0.5 MG/1
1-2 TABLET ORAL EVERY 30 MIN PRN
Status: DISCONTINUED | OUTPATIENT
Start: 2022-09-26 | End: 2022-09-29 | Stop reason: HOSPADM

## 2022-09-26 RX ORDER — LISINOPRIL 5 MG/1
5 TABLET ORAL DAILY
Status: DISCONTINUED | OUTPATIENT
Start: 2022-09-27 | End: 2022-09-29 | Stop reason: HOSPADM

## 2022-09-26 RX ORDER — TIZANIDINE 2 MG/1
2 TABLET ORAL 3 TIMES DAILY PRN
COMMUNITY

## 2022-09-26 RX ORDER — ACETAMINOPHEN 325 MG/1
650 TABLET ORAL EVERY 6 HOURS PRN
Status: DISCONTINUED | OUTPATIENT
Start: 2022-09-26 | End: 2022-09-29 | Stop reason: HOSPADM

## 2022-09-26 RX ORDER — ASPIRIN 81 MG/1
81 TABLET, CHEWABLE ORAL DAILY
Status: DISCONTINUED | OUTPATIENT
Start: 2022-09-26 | End: 2022-09-29 | Stop reason: HOSPADM

## 2022-09-26 RX ORDER — FLUMAZENIL 0.1 MG/ML
0.2 INJECTION, SOLUTION INTRAVENOUS
Status: DISCONTINUED | OUTPATIENT
Start: 2022-09-26 | End: 2022-09-29 | Stop reason: HOSPADM

## 2022-09-26 RX ORDER — TIZANIDINE 2 MG/1
2 TABLET ORAL 3 TIMES DAILY PRN
Status: DISCONTINUED | OUTPATIENT
Start: 2022-09-26 | End: 2022-09-29 | Stop reason: HOSPADM

## 2022-09-26 RX ORDER — ATENOLOL 25 MG/1
25 TABLET ORAL DAILY
Status: DISCONTINUED | OUTPATIENT
Start: 2022-09-26 | End: 2022-09-29 | Stop reason: HOSPADM

## 2022-09-26 RX ORDER — PANTOPRAZOLE SODIUM 20 MG/1
40 TABLET, DELAYED RELEASE ORAL DAILY
Status: DISCONTINUED | OUTPATIENT
Start: 2022-09-27 | End: 2022-09-29 | Stop reason: HOSPADM

## 2022-09-26 RX ORDER — CALCIUM CARBONATE 500(1250)
500 TABLET ORAL DAILY
Status: DISCONTINUED | OUTPATIENT
Start: 2022-09-27 | End: 2022-09-29 | Stop reason: HOSPADM

## 2022-09-26 RX ORDER — LORAZEPAM 2 MG/ML
1-2 INJECTION INTRAMUSCULAR EVERY 30 MIN PRN
Status: DISCONTINUED | OUTPATIENT
Start: 2022-09-26 | End: 2022-09-29 | Stop reason: HOSPADM

## 2022-09-26 RX ORDER — CEFTRIAXONE 2 G/1
2 INJECTION, POWDER, FOR SOLUTION INTRAMUSCULAR; INTRAVENOUS ONCE
Status: COMPLETED | OUTPATIENT
Start: 2022-09-26 | End: 2022-09-27

## 2022-09-26 RX ORDER — ACETAMINOPHEN 650 MG/1
650 SUPPOSITORY RECTAL EVERY 6 HOURS PRN
Status: DISCONTINUED | OUTPATIENT
Start: 2022-09-26 | End: 2022-09-29 | Stop reason: HOSPADM

## 2022-09-26 RX ORDER — DULOXETIN HYDROCHLORIDE 60 MG/1
60 CAPSULE, DELAYED RELEASE ORAL 2 TIMES DAILY
Status: DISCONTINUED | OUTPATIENT
Start: 2022-09-26 | End: 2022-09-29 | Stop reason: HOSPADM

## 2022-09-26 RX ORDER — OLANZAPINE 5 MG/1
5-10 TABLET, ORALLY DISINTEGRATING ORAL EVERY 6 HOURS PRN
Status: DISCONTINUED | OUTPATIENT
Start: 2022-09-26 | End: 2022-09-29 | Stop reason: HOSPADM

## 2022-09-26 RX ORDER — ACETAMINOPHEN 325 MG/1
975 TABLET ORAL ONCE
Status: COMPLETED | OUTPATIENT
Start: 2022-09-26 | End: 2022-09-26

## 2022-09-26 RX ORDER — FOLIC ACID 1 MG/1
1 TABLET ORAL DAILY
Status: DISCONTINUED | OUTPATIENT
Start: 2022-09-27 | End: 2022-09-29 | Stop reason: HOSPADM

## 2022-09-26 RX ORDER — ONDANSETRON 4 MG/1
4 TABLET, ORALLY DISINTEGRATING ORAL EVERY 6 HOURS PRN
Status: DISCONTINUED | OUTPATIENT
Start: 2022-09-26 | End: 2022-09-29 | Stop reason: HOSPADM

## 2022-09-26 RX ORDER — GUAIFENESIN 600 MG/1
600 TABLET, EXTENDED RELEASE ORAL 2 TIMES DAILY
Status: DISCONTINUED | OUTPATIENT
Start: 2022-09-26 | End: 2022-09-29 | Stop reason: HOSPADM

## 2022-09-26 RX ORDER — GABAPENTIN 300 MG/1
300 CAPSULE ORAL 3 TIMES DAILY
Status: DISCONTINUED | OUTPATIENT
Start: 2022-09-26 | End: 2022-09-29 | Stop reason: HOSPADM

## 2022-09-26 RX ORDER — ACETAMINOPHEN 500 MG
1000 TABLET ORAL EVERY 8 HOURS PRN
Status: DISCONTINUED | OUTPATIENT
Start: 2022-09-26 | End: 2022-09-29 | Stop reason: HOSPADM

## 2022-09-26 RX ORDER — LANOLIN ALCOHOL/MO/W.PET/CERES
1000 CREAM (GRAM) TOPICAL DAILY
Status: DISCONTINUED | OUTPATIENT
Start: 2022-09-27 | End: 2022-09-29 | Stop reason: HOSPADM

## 2022-09-26 RX ORDER — TAMSULOSIN HYDROCHLORIDE 0.4 MG/1
0.8 CAPSULE ORAL DAILY
Status: DISCONTINUED | OUTPATIENT
Start: 2022-09-26 | End: 2022-09-29 | Stop reason: HOSPADM

## 2022-09-26 RX ORDER — CEFAZOLIN SODIUM 1 G/50ML
2000 SOLUTION INTRAVENOUS ONCE
Status: COMPLETED | OUTPATIENT
Start: 2022-09-26 | End: 2022-09-27

## 2022-09-26 RX ORDER — HALOPERIDOL 5 MG/ML
2.5-5 INJECTION INTRAMUSCULAR EVERY 6 HOURS PRN
Status: DISCONTINUED | OUTPATIENT
Start: 2022-09-26 | End: 2022-09-29 | Stop reason: HOSPADM

## 2022-09-26 RX ORDER — IOPAMIDOL 755 MG/ML
100 INJECTION, SOLUTION INTRAVASCULAR ONCE
Status: COMPLETED | OUTPATIENT
Start: 2022-09-26 | End: 2022-09-26

## 2022-09-26 RX ORDER — LIDOCAINE 4 G/G
1 PATCH TOPICAL ONCE
Status: COMPLETED | OUTPATIENT
Start: 2022-09-26 | End: 2022-09-27

## 2022-09-26 RX ORDER — MAGNESIUM OXIDE 400 MG/1
800 TABLET ORAL ONCE
Status: COMPLETED | OUTPATIENT
Start: 2022-09-26 | End: 2022-09-26

## 2022-09-26 RX ORDER — ONDANSETRON 2 MG/ML
4 INJECTION INTRAMUSCULAR; INTRAVENOUS EVERY 6 HOURS PRN
Status: DISCONTINUED | OUTPATIENT
Start: 2022-09-26 | End: 2022-09-29 | Stop reason: HOSPADM

## 2022-09-26 RX ORDER — KETOROLAC TROMETHAMINE 15 MG/ML
15 INJECTION, SOLUTION INTRAMUSCULAR; INTRAVENOUS ONCE
Status: COMPLETED | OUTPATIENT
Start: 2022-09-26 | End: 2022-09-26

## 2022-09-26 RX ADMIN — VANCOMYCIN HYDROCHLORIDE 2000 MG: 5 INJECTION, POWDER, LYOPHILIZED, FOR SOLUTION INTRAVENOUS at 23:23

## 2022-09-26 RX ADMIN — SODIUM CHLORIDE 1000 ML: 9 INJECTION, SOLUTION INTRAVENOUS at 22:20

## 2022-09-26 RX ADMIN — ACETAMINOPHEN 975 MG: 325 TABLET, FILM COATED ORAL at 22:22

## 2022-09-26 RX ADMIN — MAGNESIUM OXIDE TAB 400 MG (241.3 MG ELEMENTAL MG) 800 MG: 400 (241.3 MG) TAB at 22:21

## 2022-09-26 RX ADMIN — LIDOCAINE PATCH 4% 1 PATCH: 40 PATCH TOPICAL at 22:22

## 2022-09-26 RX ADMIN — KETOROLAC TROMETHAMINE 15 MG: 15 INJECTION, SOLUTION INTRAMUSCULAR; INTRAVENOUS at 22:20

## 2022-09-26 RX ADMIN — IOPAMIDOL 100 ML: 755 INJECTION, SOLUTION INTRAVENOUS at 19:50

## 2022-09-26 RX ADMIN — CEFTRIAXONE SODIUM 2 G: 2 INJECTION, POWDER, FOR SOLUTION INTRAMUSCULAR; INTRAVENOUS at 23:23

## 2022-09-26 ASSESSMENT — ENCOUNTER SYMPTOMS
ABDOMINAL PAIN: 0
BACK PAIN: 0
ARTHRALGIAS: 0
WEAKNESS: 1

## 2022-09-26 ASSESSMENT — ACTIVITIES OF DAILY LIVING (ADL)
ADLS_ACUITY_SCORE: 35

## 2022-09-26 NOTE — ED TRIAGE NOTES
Patient was at [employer] around 1430 hour, patient had sudden onset of weakness in bilateral legs and fell down to floor. Landing was controled, denied hitting head or LOC. EMS called and patient brought in by [facility] fire. Blood glucose 98 per EMS.

## 2022-09-26 NOTE — ED PROVIDER NOTES
"EMERGENCY DEPARTMENT ENCOUNTER      NAME: Reggie Gomes  AGE: 77 year old male  YOB: 1945  MRN: [id number]  EVALUATION DATE & TIME: 9/26/2022  4:08 PM    PCP: Marlon Mae    ED PROVIDER: Maurice Velásquez M.D.      Chief Complaint   Patient presents with     Generalized Weakness         IMPRESSION  1. Spinal stenosis of lumbar region, unspecified whether neurogenic claudication present        PLAN  - admit to hospitalist for further care; med/surg obs    - patient developed new fever after admission orders placed; UA in process but suspect this as source---does not meet sepsis criteria----Rocephin, vancomycin, IVF, blood cultures ordered    ED COURSE & MEDICAL DECISION MAKING    ED Course as of 09/26/22 2206   Mon Sep 26, 2022   2129 77yoM with history of lumbar stenosis (s/p surgery, has had injections in the past), CAD (s/p stent), prior prostate & bladder cancer, s/p Ferrer catheter, HLD presenting per EMS for evaluation of bilateral leg weakness. Was walking (uses cane at baseline) in a grocery store when \"my legs just gave out\" and he collapsed to the ground. Denies any head injury or LOC. No pain prior to or resulting from the fall. No abdominal pain, nausea, vomiting. States he was feeling well earlier today.    I was called to ED triage for possible stroke code; I cancelled stroke code given no lateralizing findings. Does have 4/5 strength to hip flexion strength bilaterally but no other appreciable weakness, no cranial nerve abnormality. Top differential is acute aortic syndrome vs lumbar spine etiology. Good DP pulses & cap refill bilaterally make acute aortic syndrome less likely.    Did obtain CT aorta which was unremarkable with no dissection or aneurysm. No other acute intraabdominal or intrathoracic etiology either.    MRI lumbar spine obtained with multiple levels of central & foraminal stenoses---did have on prior MRI but does have mild worsening from prior. No cord enhancement " noted to suggest cauda equina syndrome. On recheck, patient with improved hip flexion strength to my exam compared to initial presentation (no meds given). Patient attempted ambulation attempt but unable to get himself out of bed---reports low back pain with attempts to get himself him. Failed walk test for home.    Otherwise, workup with overall unremarkable blood tests (mag 1.5, replaced PO), baseline hemoglobin at 12.9, no leukocytosis, normal lactic acid, negative COVID. Suspect lumbar stenosis with radiculopathy driving presentation today. Unable to walk as usual though so warrants admission. Patient agreeable with this plan. Hospitalist paged at this time for admission.   2138 I updated wife (Lucy, [phone]) on plan for admission.   2148 Consulted hospitalist for admission; they agreed. Patient understood and agreed with the plan; no further questions at the time of admission.   2201 After admission orders placed, patient found to have fever 102.8F. Stable BP with no tachycardia. Suspect chronic Ferrer as source. Rocephin & vancomycin thus ordered along with IVF, blood cultures. UA in process at this time. Does not meet sepsis criteria per vitals & no leukocytosis with normal lactic acid. No meningismus on exam to suggest meningitis.       --------------------------------------------------------------------------------   --------------------------------------------------------------------------------     4:03 PM Stroke code called in Triage. I met with the patient for the initial interview and physical examination. Discussed plan for treatment and workup in the ED.  4:07 PM Stroke code cancelled.   4:09 PM I spoke with the stroke neurologist, Dr. Hudson.  9:07 PM I rechecked and updated the patient. The patient is feeling improved. Will do an ambulation trial.  Unable to contact wife.  Will attempt to contact again.   9:20 PM I called the patient's wife. Discussed the patient's case.   9:26 PM the patient  failed his walk test.  Plan to admit.   9:37 PM I called and updated the patient's wife.   9:45 PM I discussed the case with hospitalist, Dr. Wisdom, who accepts the patient.       This patient involved a high degree of complexity in medical decision making, as significant risks were present and assessed.    Broad differential considered for this patient presenting, including but not limited to:  cauda equina syndrome, lumbar stenosis, radiculopathy, ischemic limb, acute aortic syndrome    I wore the following PPE during this patient encounter:  N95 mask, face shield w/ eye protection, gloves.    MEDICATIONS GIVEN IN THE EMERGENCY DEPARTMENT  Medications   ketorolac (TORADOL) injection 15 mg (has no administration in time range)   acetaminophen (TYLENOL) tablet 975 mg (has no administration in time range)   Lidocaine (LIDOCARE) 4 % Patch 1 patch (has no administration in time range)   magnesium oxide (MAG-OX) tablet 800 mg (has no administration in time range)   iopamidol (ISOVUE-370) solution 100 mL (100 mLs Intravenous Given 9/26/22 1950)             =================================================================      HPI  Patient information was obtained from: the patient and Dr. Wilson in Triage    Use of : N/A      Reggie Gomes is a 77 year old male with a pertinent history of CAD, prostate cancer, hernia, OA of hip, cellulitis of LLE, abnormality in gait due to impairment of balance, cervical stenosis of spine, bladder cancer, muscle weakness, lumbar radiculopathy who presents to this ED by EMS for evaluation of generalized weakness.     Per Dr. Wilson in triage, per EMS, the patient walked into the grocery store today and noted first right then left lower extremity weakness.  He was unable to walk out of the store and was picked up by EMS.      The patient reports he has had issues with weakness in the past, but he has never been this weak beore.    The patient denies back pain, leg pain,  abdominal pain, and any other symptoms or complaints at this time.     MHx: Does not believe he takes a blood thinner.  History of back surgery. Has had a arevalo in place for 3 months.       REVIEW OF SYSTEMS   Review of Systems   Gastrointestinal: Negative for abdominal pain.   Musculoskeletal: Positive for gait problem (due to weakness). Negative for arthralgias (BLE) and back pain.   Neurological: Positive for weakness (BLE - first right, the LLE).   All other systems reviewed and are negative.    All other systems reviewed and are negative except as noted above in HPI.        --------------- MEDICAL HISTORY ---------------  PAST MEDICAL HISTORY:  Past Medical History:   Diagnosis Date     Arthritis     osteo     BPH (benign prostatic hyperplasia)      Cancer (H)     prostate and bladder     Coronary artery disease      GERD (gastroesophageal reflux disease)      Hydrocele      Hyperlipidemia      Hypertension      Lumbar spinal stenosis      Myocardial infarction (H) 2004     Prostate cancer (H)      Patient Active Problem List   Diagnosis     Dyslipidemia     Coronary Artery Disease     Prostate cancer (H)     Hydrocele, right     Ventral incisional hernia     Hip pain     Osteoarthritis     OA (osteoarthritis) of hip     Cellulitis of left upper extremity     Abnormality of gait due to impairment of balance     Acromioclavicular joint arthritis     Cervical stenosis of spine     Acute bilateral low back pain without sciatica     Dyslipidemia     H/O heart artery stent     History of bladder cancer     History of prostate cancer     Right foot drop     Muscle weakness     Lumbar radiculopathy     Complete tear of rotator cuff     Cough       PAST SURGICAL HISTORY:  Past Surgical History:   Procedure Laterality Date     APPENDECTOMY       ARTHROPLASTY REVISION HIP Right 2014     BOWEL RESECTION      with colostomy and subsequent takedown of colostomy     COLON SURGERY       CORONARY ANGIOPLASTY  2004    1 stent      HERNIA REPAIR       LUMBAR SPINE SURGERY      X2     ORCHIECTOMY SCROTAL Right 10/27/2015    Procedure: SIMPLE RIGHT ORCHIECTOMY;  Surgeon: Mario Alberto Thompson MD;  [place] [facility] [place]  Service:      PROSTATE SURGERY       SMALL INTESTINE SURGERY       SPINE SURGERY       TOTAL HIP ARTHROPLASTY Right 5/2014     VASECTOMY       ZZC TOTAL HIP ARTHROPLASTY Left 5/11/2018    Procedure: LEFT, ARTHROPLASTY, TOTAL, TOTAL DIRECT ANTERIOR APPROACH;  Surgeon: Manuel Yap MD;  Location: [facility];  Service: Orthopedics       CURRENT MEDICATIONS:      Current Facility-Administered Medications:      acetaminophen (TYLENOL) tablet 975 mg, 975 mg, Oral, Once, Maurice Velásquez MD     ketorolac (TORADOL) injection 15 mg, 15 mg, Intravenous, Once, Maurice Velásquez MD     Lidocaine (LIDOCARE) 4 % Patch 1 patch, 1 patch, Transdermal, Once, Maurice Velásquez MD     magnesium oxide (MAG-OX) tablet 800 mg, 800 mg, Oral, Once, Maurice Velásquez MD    Current Outpatient Medications:      acetaminophen (TYLENOL) 500 MG tablet, Take 2 tablets (1,000 mg) by mouth every 8 hours as needed for mild pain, Disp: , Rfl:      aspirin (ASA) 81 MG chewable tablet, Take 81 mg by mouth daily, Disp: , Rfl:      atenolol (TENORMIN) 25 MG tablet, Take 25 mg by mouth daily, Disp: , Rfl:      atorvastatin (LIPITOR) 40 MG tablet, [ATORVASTATIN (LIPITOR) 40 MG TABLET] Take 40 mg by mouth every morning., Disp: , Rfl:      CALCIUM CARBONATE (CALCIUM 500 ORAL), [CALCIUM CARBONATE (CALCIUM 500 ORAL)] Take 1 tablet by mouth daily., Disp: , Rfl:      cyclobenzaprine (FLEXERIL) 5 MG tablet, [CYCLOBENZAPRINE (FLEXERIL) 5 MG TABLET] Take 5 mg by mouth daily as needed for muscle spasms., Disp: , Rfl:      DULoxetine (CYMBALTA) 60 MG capsule, Take 60 mg by mouth 2 times daily, Disp: , Rfl:      gabapentin (NEURONTIN) 300 MG capsule, Take 300 mg by mouth 2 times daily Bid at noon and hs, Disp: , Rfl:      lisinopril (PRINIVIL,ZESTRIL)  "5 MG tablet, [LISINOPRIL (PRINIVIL,ZESTRIL) 5 MG TABLET] Take 1 tablet (5 mg total) by mouth daily., Disp: 90 tablet, Rfl: 0     omeprazole (PRILOSEC) 20 MG capsule, [OMEPRAZOLE (PRILOSEC) 20 MG CAPSULE] Take 20 mg by mouth daily., Disp: , Rfl:      psyllium (METAMUCIL) 0.52 gram capsule, Take 2.08 g by mouth daily 4 caps qday, Disp: , Rfl:      tamsulosin (FLOMAX) 0.4 mg Cp24, [TAMSULOSIN (FLOMAX) 0.4 MG CP24] Take 1 capsule (0.4 mg total) by mouth 2 (two) times a day. (Patient taking differently: Take 0.8 mg by mouth daily 1cap qday x 14 days then 2 caps(0.8mg) qday- 10/11/21- is on 0.8mg qday), Disp: 180 capsule, Rfl: 4     thiamine 50 MG TABS, Take 50 mg by mouth daily Not sure of dose at this time., Disp: , Rfl:      vitamin B-12 (CYANOCOBALAMIN) 1000 MCG tablet, Take 1,000 mcg by mouth daily, Disp: , Rfl:     ALLERGIES:  Allergies   Allergen Reactions     Barbiturates        FAMILY HISTORY:  Family History   Problem Relation Age of Onset     Hypertension Father      Coronary Artery Disease Mother      Anesthesia Reaction No family hx of        SOCIAL HISTORY:   Social History     Socioeconomic History     Marital status:    Tobacco Use     Smoking status: Former Smoker     Quit date: 10/26/2005     Years since quittin.9     Smokeless tobacco: Never Used   Substance and Sexual Activity     Alcohol use: Yes     Alcohol/week: 1.0 standard drink     Comment: Alcoholic Drinks/day: daily     Drug use: Yes     Types: Hydrocodone     Comment: Drug use: takes 1 tablet 4 times daily   Social History Narrative    ** Merged History Encounter **         --------------- PHYSICAL EXAM ---------------  Nursing notes and vitals reviewed by me.  VITALS:  Vitals:    22 1830 22 1840 22 1845 22 2152   BP: (!) 129/92   138/79   Pulse: 96  98 87   Resp: (!) 48  (!) 43 (!) 32   Temp:    (!) 102.8  F (39.3  C)   TempSrc:    Oral   SpO2:   96% 96%   Weight:       Height:  1.651 m (5' 5\")   "       PHYSICAL EXAM:    General:  alert, interactive, no distress  Eyes:  conjunctivae clear, conjugate gaze. PERRL. 3 mm pupils. EOMI.   HENT:  atraumatic, nose with no rhinorrhea, oropharynx clear  Neck:  no meningismus  Cardiovascular:  HR 70s during exam, regular rhythm, no murmurs, brisk cap refill. 2+ DP/radial pulses bilaterally.   Chest:  no chest wall tenderness  Pulmonary:  no stridor, normal phonation, normal work of breathing, clear lungs bilaterally  Abdomen:  soft, nondistended, nontender. Small NT ventral hernia.   :  no CVA tenderness  Back:  no midline spinal tenderness  Musculoskeletal:  no pretibial edema, no calf tenderness. Gross ROM intact to joints of extremities with no obvious deformities.  Skin:  warm, dry, no rash  Neuro:  awake, alert, answers questions appropriately, follows commands, moves all limbs. Negative pronator drift. 5/5 strength BUE, 4/5 strength HF bilaterally, 5/5 strength AP/AD bilaterally. Sensation intact to all extremities. No tremor.   Psych:  calm, normal affect      --------------- RESULTS ---------------  EKG:    Reviewed and interpreted by me.  - NSR at 91bpm, no ST or T wave changes, normal intervals  - no priors for comparison  My read.    LAB:  Reviewed and interpreted by me.  Results for orders placed or performed during the hospital encounter of 09/26/22   CTA Chest Abdomen Pelvis w Contrast    Impression    IMPRESSION:  1.  No evidence of aortic aneurysm or dissection.  2.  Mildly enlarged heart with coronary artery disease and atherosclerotic vascular disease.  3.  Status post partial sigmoidectomy with anastomosis. Scattered diverticulosis. No obstruction, colitis, or diverticulitis.  4.  Cholelithiasis.     Lumbar spine MRI w/o contrast    Impression    IMPRESSION:  1.  Motion degraded exam.  2.  Again seen are previous posterior instrumented L4-L5 fusion changes and laminectomy changes. Laminectomy changes may also involve the L3-L4 level.  3.  Vertebral  body heights and lateral lumbar alignment similar to prior MRI 11/15/2017 with low-grade retrolisthesis L1 on L2 and L2 on L3.  4.  At the L2-L3 level there is a shallow broad-based disc and osteophyte complex with moderate facet arthropathy and hypertrophy. Moderate narrowing of the central canal, mildly progressed from previous MRI. Some narrowing of both lateral recesses. Mild   to moderate right foraminal narrowing and moderate left foraminal narrowing both progressed from prior MRI.  5.  At that L3-L4 level there is a shallow broad-based disc and osteophyte complex. There may have been prior laminectomies. Prominent facet arthropathy at this level. Overall central canal is moderately narrowed similar to previous MRI. Prominent   narrowing of both L3-L4 foramen right greater than left similar to prior MRI.  6.  At the L4-L5 level where there are fusion and laminectomy changes and the central canal remains adequate and both neural foramen at this level remain adequate.  7.  At the L5-S1 level there is prominent left-sided facet arthropathy with a new left-sided facet effusion. There is a shallow disc and osteophyte complex. Central canal adequate with prominent left lateral recess narrowing increased from prior MRI.   Mild right foraminal narrowing and moderate left foraminal narrowing.   Basic metabolic panel   Result Value Ref Range    Sodium 138 136 - 145 mmol/L    Potassium 4.3 3.4 - 5.3 mmol/L    Chloride 100 98 - 107 mmol/L    Carbon Dioxide (CO2) 27 22 - 29 mmol/L    Anion Gap 11 7 - 15 mmol/L    Urea Nitrogen 12.6 8.0 - 23.0 mg/dL    Creatinine 0.95 0.67 - 1.17 mg/dL    Calcium 8.9 8.8 - 10.2 mg/dL    Glucose 99 70 - 99 mg/dL    GFR Estimate 83 >60 mL/min/1.73m2   Hepatic function panel   Result Value Ref Range    Protein Total 6.9 6.4 - 8.3 g/dL    Albumin 4.0 3.5 - 5.2 g/dL    Bilirubin Total 1.9 (H) <=1.2 mg/dL    Alkaline Phosphatase 51 40 - 129 U/L    AST 19 10 - 50 U/L    ALT 21 10 - 50 U/L     Bilirubin Direct 0.35 (H) 0.00 - 0.30 mg/dL   Result Value Ref Range    Lipase 9 (L) 13 - 60 U/L   Result Value Ref Range    INR 1.10 0.85 - 1.15   Lactic acid whole blood   Result Value Ref Range    Lactic Acid 1.3 0.7 - 2.0 mmol/L   Result Value Ref Range    Magnesium 1.5 (L) 1.7 - 2.3 mg/dL   TSH with free T4 reflex   Result Value Ref Range    TSH 1.26 0.30 - 4.20 uIU/mL   Asymptomatic COVID-19 Virus (Coronavirus) by PCR Nasopharyngeal    Specimen: Nasopharyngeal; Swab   Result Value Ref Range    SARS CoV2 PCR Negative Negative   CBC with platelets and differential   Result Value Ref Range    WBC Count 5.0 4.0 - 11.0 10e3/uL    RBC Count 3.78 (L) 4.40 - 5.90 10e6/uL    Hemoglobin 12.9 (L) 13.3 - 17.7 g/dL    Hematocrit 39.5 (L) 40.0 - 53.0 %     (H) 78 - 100 fL    MCH 34.1 (H) 26.5 - 33.0 pg    MCHC 32.7 31.5 - 36.5 g/dL    RDW 13.4 10.0 - 15.0 %    Platelet Count 116 (L) 150 - 450 10e3/uL    % Neutrophils 84 %    % Lymphocytes 8 %    % Monocytes 7 %    % Eosinophils 1 %    % Basophils 0 %    % Immature Granulocytes 0 %    NRBCs per 100 WBC 0 <1 /100    Absolute Neutrophils 4.2 1.6 - 8.3 10e3/uL    Absolute Lymphocytes 0.4 (L) 0.8 - 5.3 10e3/uL    Absolute Monocytes 0.4 0.0 - 1.3 10e3/uL    Absolute Eosinophils 0.0 0.0 - 0.7 10e3/uL    Absolute Basophils 0.0 0.0 - 0.2 10e3/uL    Absolute Immature Granulocytes 0.0 <=0.4 10e3/uL    Absolute NRBCs 0.0 10e3/uL   Adult Type and Screen   Result Value Ref Range    ABO/RH(D) A POS     Antibody Screen Negative Negative    SPECIMEN EXPIRATION DATE [id number]        RADIOLOGY:  Reviewed by me. Please see official radiology report.  Recent Results (from the past 24 hour(s))   CTA Chest Abdomen Pelvis w Contrast    Narrative    EXAM: CTA CHEST ABDOMEN PELVIS W CONTRAST  LOCATION: [facility]  DATE/TIME: 9/26/2022 7:49 PM    INDICATION: Question acute aortic syndrome, acute bilateral hip flexion weakness.  COMPARISON: None.  TECHNIQUE: CT  angiogram chest abdomen pelvis during arterial phase of injection of IV contrast. 2D and 3D MIP reconstructions were performed by the CT technologist. Dose reduction techniques were used.   CONTRAST: isovue 370 100ml    FINDINGS:   CT ANGIOGRAM CHEST, ABDOMEN, AND PELVIS: No evidence of intramural hematoma. Tortuous atherosclerotic aorta. No evidence of dissection or aneurysm. Patent branch vessels.    LUNGS AND PLEURA: Normal.    MEDIASTINUM/AXILLAE: Normal size heart. No pericardial effusion. No hilar or mediastinal lymphadenopathy. Small hiatal hernia.    CORONARY ARTERY CALCIFICATION: Severe.    HEPATOBILIARY: Cholelithiasis.    PANCREAS: Normal.    SPLEEN: Normal.    ADRENAL GLANDS: Normal.    KIDNEYS/BLADDER: Normal.    BOWEL: Status post sigmoidectomy with colocolic anastomosis. Scattered diverticulosis. No obstruction, colitis, or diverticulitis.    LYMPH NODES: Normal.    PELVIC ORGANS: Normal.    MUSCULOSKELETAL: L4-L5 posterior fusion. Bilateral total hip arthroplasty.      Impression    IMPRESSION:  1.  No evidence of aortic aneurysm or dissection.  2.  Mildly enlarged heart with coronary artery disease and atherosclerotic vascular disease.  3.  Status post partial sigmoidectomy with anastomosis. Scattered diverticulosis. No obstruction, colitis, or diverticulitis.  4.  Cholelithiasis.     Lumbar spine MRI w/o contrast    Narrative    EXAM: MR LUMBAR SPINE W/O CONTRAST  LOCATION: [facility]  DATE/TIME: 9/26/2022 7:56 PM    INDICATION: New-onset bilateral lower extremity weakness. Previous lumbar fusion.  COMPARISON: CT 11/24/2021. MRI 11/15/2017 from [facility].  TECHNIQUE: Routine Lumbar Spine MRI without IV contrast. Motion artifact.    FINDINGS:   Nomenclature is based on 5 lumbar type vertebral bodies. Accentuated lumbar lordosis normal vertebral body heights and marrow pattern. Stable mild degenerative retrolisthesis L1 on L2 and L2 on L3. Grossly stable appearing posterior  instrumented L4-L5   fusion and laminectomy changes. Some artifact from the hardware. Normal distal spinal cord and cauda equina with conus medullaris at L1-L2. Paraspinal muscle atrophy. Artifact from bilateral hip arthroplasties.    T12-L1: Normal disc height with mild loss of disc signal. No herniation. Mild facet arthropathy. No spinal canal narrowing or neural foraminal narrowing.     L1-L2: Moderate loss of disc height and signal with chronic Schmorl's node deformity inferior endplate of L1 posteriorly. There is a shallow broad-based disc and osteophyte complex at this level. Mild to moderate facet arthropathy. Mild central canal   narrowing similar to prior. Mild right foraminal narrowing and mild to moderate left foraminal narrowing. These changes are slightly progressed on the left since prior MRI.    L2-L3: Moderate loss of disc height and signal mildly progressed from prior. Shallow broad-based disc and osteophyte complex. Moderate facet arthropathy. Moderate narrowing of the central spinal canal at this level mildly progressed from prior MRI. Some   narrowing of both lateral recesses. Mild to moderate right foraminal narrowing and moderate left foraminal narrowing both progressed from prior.     L3-L4: Moderate loss of disc height and signal. Prominent facet arthropathy. There may have been prior laminectomies at this level. Moderate central canal narrowing similar to prior MRI. Prominent narrowing of both L3-L4 foramen right greater than left   similar to prior MRI.    L4-L5: Mild loss of disc height and signal with previous posterior instrumented fusion changes and laminectomy changes again seen. Some artifact from the hardware. Central canal is adequate. Both neural foramen at this level appear adequate.    L5-S1: Normal disc height with mild loss of disc signal. Prominent facet arthropathy left greater than right with new left facet effusion. Shallow broad-based disc and osteophyte complex. Central  canal remains adequate. Prominent left lateral recess   narrowing increased from prior MRI. Mild right foraminal narrowing. Moderate left foraminal narrowing.      Impression    IMPRESSION:  1.  Motion degraded exam.  2.  Again seen are previous posterior instrumented L4-L5 fusion changes and laminectomy changes. Laminectomy changes may also involve the L3-L4 level.  3.  Vertebral body heights and lateral lumbar alignment similar to prior MRI 11/15/2017 with low-grade retrolisthesis L1 on L2 and L2 on L3.  4.  At the L2-L3 level there is a shallow broad-based disc and osteophyte complex with moderate facet arthropathy and hypertrophy. Moderate narrowing of the central canal, mildly progressed from previous MRI. Some narrowing of both lateral recesses. Mild   to moderate right foraminal narrowing and moderate left foraminal narrowing both progressed from prior MRI.  5.  At that L3-L4 level there is a shallow broad-based disc and osteophyte complex. There may have been prior laminectomies. Prominent facet arthropathy at this level. Overall central canal is moderately narrowed similar to previous MRI. Prominent   narrowing of both L3-L4 foramen right greater than left similar to prior MRI.  6.  At the L4-L5 level where there are fusion and laminectomy changes and the central canal remains adequate and both neural foramen at this level remain adequate.  7.  At the L5-S1 level there is prominent left-sided facet arthropathy with a new left-sided facet effusion. There is a shallow disc and osteophyte complex. Central canal adequate with prominent left lateral recess narrowing increased from prior MRI.   Mild right foraminal narrowing and moderate left foraminal narrowing.           I, Rafael Wiseman, am serving as a scribe to document services personally performed by Dr. Maurice Velásquez based on my observation and the provider's statements to me. I, Maurice Velásquez MD attest that Rafael Wiseman is acting in a scribe  capacity, has observed my performance of the services and has documented them in accordance with my direction.      Maurice Velásquez MD  09/26/22  Emergency Medicine  [facility] EMERGENCY DEPARTMENT  [address] [place] [id number] [phone]  Dept: [phone]     Maurice Velásquez MD  09/26/22 5625

## 2022-09-26 NOTE — ED NOTES
Pt reports having 2 falls today and increased weakness. Frequent productive cough x 10 days, negative home covid test 3 days ago. Reports SOB, no pain.

## 2022-09-27 ENCOUNTER — APPOINTMENT (OUTPATIENT)
Dept: PHYSICAL THERAPY | Facility: HOSPITAL | Age: 77
DRG: 690 | End: 2022-09-27
Attending: HOSPITALIST
Payer: MEDICARE

## 2022-09-27 ENCOUNTER — APPOINTMENT (OUTPATIENT)
Dept: OCCUPATIONAL THERAPY | Facility: HOSPITAL | Age: 77
DRG: 690 | End: 2022-09-27
Attending: HOSPITALIST
Payer: MEDICARE

## 2022-09-27 LAB
ACINETOBACTER SPECIES: NOT DETECTED
ATRIAL RATE - MUSE: 91 BPM
BACTERIA SPT CULT: NORMAL
CITROBACTER SPECIES: NOT DETECTED
CTX-M: NOT DETECTED
DIASTOLIC BLOOD PRESSURE - MUSE: NORMAL MMHG
ENTEROBACTER SPECIES: NOT DETECTED
ESCHERICHIA COLI: DETECTED
GRAM STAIN RESULT: NORMAL
IMP: NOT DETECTED
INTERPRETATION ECG - MUSE: NORMAL
KLEBSIELLA OXYTOCA: NOT DETECTED
KLEBSIELLA PNEUMONIAE: NOT DETECTED
KPC: NOT DETECTED
MAGNESIUM SERPL-MCNC: 1.6 MG/DL (ref 1.7–2.3)
NDM: NOT DETECTED
OXA (DETECTED/NOT DETECTED): NOT DETECTED
P AXIS - MUSE: 55 DEGREES
PR INTERVAL - MUSE: 156 MS
PROTEUS SPECIES: NOT DETECTED
PSEUDOMONAS AERUGINOSA: NOT DETECTED
QRS DURATION - MUSE: 74 MS
QT - MUSE: 346 MS
QTC - MUSE: 425 MS
R AXIS - MUSE: -11 DEGREES
SYSTOLIC BLOOD PRESSURE - MUSE: NORMAL MMHG
T AXIS - MUSE: 84 DEGREES
VENTRICULAR RATE- MUSE: 91 BPM
VIM: NOT DETECTED

## 2022-09-27 PROCEDURE — 97166 OT EVAL MOD COMPLEX 45 MIN: CPT | Mod: GO

## 2022-09-27 PROCEDURE — 83735 ASSAY OF MAGNESIUM: CPT | Performed by: HOSPITALIST

## 2022-09-27 PROCEDURE — 250N000013 HC RX MED GY IP 250 OP 250 PS 637: Performed by: HOSPITALIST

## 2022-09-27 PROCEDURE — G0378 HOSPITAL OBSERVATION PER HR: HCPCS

## 2022-09-27 PROCEDURE — 96366 THER/PROPH/DIAG IV INF ADDON: CPT

## 2022-09-27 PROCEDURE — 97535 SELF CARE MNGMENT TRAINING: CPT | Mod: GO

## 2022-09-27 PROCEDURE — 87205 SMEAR GRAM STAIN: CPT | Performed by: HOSPITALIST

## 2022-09-27 PROCEDURE — 97161 PT EVAL LOW COMPLEX 20 MIN: CPT | Mod: GP

## 2022-09-27 PROCEDURE — 250N000011 HC RX IP 250 OP 636: Performed by: INTERNAL MEDICINE

## 2022-09-27 PROCEDURE — 96367 TX/PROPH/DG ADDL SEQ IV INF: CPT

## 2022-09-27 PROCEDURE — 97116 GAIT TRAINING THERAPY: CPT | Mod: GP

## 2022-09-27 PROCEDURE — 99225 PR SUBSEQUENT OBSERVATION CARE,LEVEL II: CPT | Performed by: HOSPITALIST

## 2022-09-27 PROCEDURE — 36415 COLL VENOUS BLD VENIPUNCTURE: CPT | Performed by: HOSPITALIST

## 2022-09-27 PROCEDURE — 250N000011 HC RX IP 250 OP 636: Performed by: HOSPITALIST

## 2022-09-27 RX ORDER — CEFTRIAXONE 1 G/1
1 INJECTION, POWDER, FOR SOLUTION INTRAMUSCULAR; INTRAVENOUS EVERY 24 HOURS
Status: DISCONTINUED | OUTPATIENT
Start: 2022-09-27 | End: 2022-09-29

## 2022-09-27 RX ORDER — MAGNESIUM SULFATE HEPTAHYDRATE 40 MG/ML
2 INJECTION, SOLUTION INTRAVENOUS ONCE
Status: COMPLETED | OUTPATIENT
Start: 2022-09-27 | End: 2022-09-27

## 2022-09-27 RX ADMIN — CEFTRIAXONE SODIUM 1 G: 1 INJECTION, POWDER, FOR SOLUTION INTRAMUSCULAR; INTRAVENOUS at 21:32

## 2022-09-27 RX ADMIN — Medication 4 CAPSULE: at 09:44

## 2022-09-27 RX ADMIN — GUAIFENESIN 600 MG: 600 TABLET, EXTENDED RELEASE ORAL at 01:12

## 2022-09-27 RX ADMIN — TAMSULOSIN HYDROCHLORIDE 0.8 MG: 0.4 CAPSULE ORAL at 01:03

## 2022-09-27 RX ADMIN — GABAPENTIN 300 MG: 300 CAPSULE ORAL at 13:25

## 2022-09-27 RX ADMIN — Medication 50 MG: at 13:25

## 2022-09-27 RX ADMIN — DULOXETINE HYDROCHLORIDE 60 MG: 60 CAPSULE, DELAYED RELEASE PELLETS ORAL at 21:44

## 2022-09-27 RX ADMIN — ASPIRIN 81 MG CHEWABLE TABLET 81 MG: 81 TABLET CHEWABLE at 07:39

## 2022-09-27 RX ADMIN — DULOXETINE HYDROCHLORIDE 60 MG: 60 CAPSULE, DELAYED RELEASE PELLETS ORAL at 11:22

## 2022-09-27 RX ADMIN — Medication 500 MG: at 09:43

## 2022-09-27 RX ADMIN — GUAIFENESIN 600 MG: 600 TABLET, EXTENDED RELEASE ORAL at 07:47

## 2022-09-27 RX ADMIN — LISINOPRIL 5 MG: 5 TABLET ORAL at 09:48

## 2022-09-27 RX ADMIN — MAGNESIUM SULFATE IN WATER 2 G: 40 INJECTION, SOLUTION INTRAVENOUS at 11:22

## 2022-09-27 RX ADMIN — TAMSULOSIN HYDROCHLORIDE 0.8 MG: 0.4 CAPSULE ORAL at 07:39

## 2022-09-27 RX ADMIN — GABAPENTIN 300 MG: 300 CAPSULE ORAL at 07:39

## 2022-09-27 RX ADMIN — GABAPENTIN 300 MG: 300 CAPSULE ORAL at 01:03

## 2022-09-27 RX ADMIN — ASPIRIN 81 MG CHEWABLE TABLET 81 MG: 81 TABLET CHEWABLE at 00:59

## 2022-09-27 RX ADMIN — PANTOPRAZOLE SODIUM 40 MG: 20 TABLET, DELAYED RELEASE ORAL at 07:39

## 2022-09-27 RX ADMIN — GABAPENTIN 300 MG: 300 CAPSULE ORAL at 21:23

## 2022-09-27 RX ADMIN — FOLIC ACID 1 MG: 1 TABLET ORAL at 07:39

## 2022-09-27 RX ADMIN — ATENOLOL 25 MG: 25 TABLET ORAL at 09:44

## 2022-09-27 RX ADMIN — Medication 1 TABLET: at 07:47

## 2022-09-27 RX ADMIN — ATORVASTATIN CALCIUM 40 MG: 40 TABLET, FILM COATED ORAL at 07:39

## 2022-09-27 RX ADMIN — ACETAMINOPHEN 1000 MG: 500 TABLET ORAL at 21:23

## 2022-09-27 RX ADMIN — ATENOLOL 25 MG: 25 TABLET ORAL at 01:04

## 2022-09-27 RX ADMIN — DULOXETINE HYDROCHLORIDE 60 MG: 60 CAPSULE, DELAYED RELEASE PELLETS ORAL at 01:03

## 2022-09-27 RX ADMIN — Medication 1000 MCG: at 09:43

## 2022-09-27 RX ADMIN — ATORVASTATIN CALCIUM 40 MG: 40 TABLET, FILM COATED ORAL at 01:03

## 2022-09-27 RX ADMIN — GUAIFENESIN 600 MG: 600 TABLET, EXTENDED RELEASE ORAL at 21:23

## 2022-09-27 ASSESSMENT — ACTIVITIES OF DAILY LIVING (ADL)
ADLS_ACUITY_SCORE: 42
ADLS_ACUITY_SCORE: 35
ADLS_ACUITY_SCORE: 42
DEPENDENT_IADLS:: INDEPENDENT
ADLS_ACUITY_SCORE: 42
ADLS_ACUITY_SCORE: 42
ADLS_ACUITY_SCORE: 35
ADLS_ACUITY_SCORE: 42
ADLS_ACUITY_SCORE: 42
ADLS_ACUITY_SCORE: 35
ADLS_ACUITY_SCORE: 35

## 2022-09-27 NOTE — PROGRESS NOTES
09/27/22 1521   Quick Adds   Type of Visit Initial Occupational Therapy Evaluation   Living Environment   People in Home spouse   Current Living Arrangements house   Home Accessibility stairs to enter home   Number of Stairs, Main Entrance 3   Transportation Anticipated family or friend will provide   Living Environment Comments Walk-in shower w/ grab bars, RTS w/ counter nearby   Self-Care   Equipment Currently Used at Home cane, straight   Activity/Exercise/Self-Care Comment Pt uses cane in the community, no AD in home. Pt I with ADLs and drives   General Information   Onset of Illness/Injury or Date of Surgery 09/26/22   Referring Physician Dr. Mcpherson   Additional Occupational Profile Info/Pertinent History of Current Problem Reggie Gomes is a 77 year old male who was brought to the emergency department by ambulance for evaluation of an episode of generalized weakness and fall.   Cognitive Status Examination   Affect/Mental Status (Cognitive) WNL   Follows Commands WNL   Bed Mobility   Bed Mobility supine-sit   Supine-Sit [place] (Bed Mobility) supervision   Transfers   Transfers sit-stand transfer;toilet transfer   Sit-Stand Transfer   Sit-Stand [place] (Transfers) supervision   Toilet Transfer   Type (Toilet Transfer) sit-stand;stand-sit   [place] Level (Toilet Transfer) supervision   Balance   Balance Comments Pt I w/ EOB sitting. Slightly unsteady with ambulation.   Activities of Daily Living   BADL Assessment/Intervention lower body dressing   Lower Body Dressing Assessment/Training   [place] Level (Lower Body Dressing) supervision   Clinical Impression   Criteria for Skilled Therapeutic Interventions Met (OT) Evaluation only  (and treat)   OT Diagnosis Decreased ADLs   OT Problem List-Impairments impacting ADL problems related to;mobility   Planned Therapy Interventions (OT) ADL retraining   Clinical Decision Making Complexity (OT) low complexity   Risk & Benefits of therapy have  been explained evaluation/treatment results reviewed   OT Discharge Planning   OT Discharge Recommendation (DC Rec) home   Therapy Certification   Start of Care Date 09/27/22   Certification date from 09/27/22   Certification date to 10/03/22   Medical Diagnosis Fall   Total Evaluation Time (Minutes)   Total Evaluation Time (Minutes) 8   OT Goals   Therapy Frequency (OT) One time eval and treatment   OT Predicted Duration/Target Date for Goal Attainment 09/27/22   OT Goals Hygiene/Grooming   OT: Hygiene/Grooming independent

## 2022-09-27 NOTE — PHARMACY-ADMISSION MEDICATION HISTORY
Pharmacy Note - Admission Medication History    Pertinent Provider Information: None     ______________________________________________________________________    Prior To Admission (PTA) med list completed and updated in EMR.       PTA Med List   Medication Sig Last Dose     acetaminophen (TYLENOL) 500 MG tablet Take 2 tablets (1,000 mg) by mouth every 8 hours as needed for mild pain      aspirin (ASA) 81 MG chewable tablet Take 81 mg by mouth daily 9/25/2022 at Unknown time     atenolol (TENORMIN) 25 MG tablet Take 25 mg by mouth daily 9/25/2022 at Unknown time     atorvastatin (LIPITOR) 40 MG tablet [ATORVASTATIN (LIPITOR) 40 MG TABLET] Take 40 mg by mouth every morning. 9/25/2022 at Unknown time     CALCIUM CARBONATE (CALCIUM 500 ORAL) [CALCIUM CARBONATE (CALCIUM 500 ORAL)] Take 1 tablet by mouth daily. 9/25/2022 at Unknown time     DULoxetine (CYMBALTA) 60 MG capsule Take 60 mg by mouth 2 times daily 9/25/2022 at Unknown time     gabapentin (NEURONTIN) 300 MG capsule Take 300 mg by mouth 3 times daily 9/25/2022 at Unknown time     lisinopril (PRINIVIL,ZESTRIL) 5 MG tablet [LISINOPRIL (PRINIVIL,ZESTRIL) 5 MG TABLET] Take 1 tablet (5 mg total) by mouth daily. 9/25/2022 at Unknown time     omeprazole (PRILOSEC) 20 MG capsule [OMEPRAZOLE (PRILOSEC) 20 MG CAPSULE] Take 20 mg by mouth daily. 9/25/2022 at Unknown time     psyllium (METAMUCIL) 0.52 gram capsule Take 2.08 g by mouth daily 4 caps qday 9/25/2022 at Unknown time     tamsulosin (FLOMAX) 0.4 mg Cp24 [TAMSULOSIN (FLOMAX) 0.4 MG CP24] Take 1 capsule (0.4 mg total) by mouth 2 (two) times a day. (Patient taking differently: Take 0.8 mg by mouth daily) 9/25/2022 at Unknown time     thiamine 50 MG TABS Take 50 mg by mouth daily Not sure of dose at this time. 9/25/2022 at Unknown time     tiZANidine (ZANAFLEX) 2 MG tablet Take 2 mg by mouth 3 times daily as needed for muscle spasms      vitamin B-12 (CYANOCOBALAMIN) 1000 MCG tablet Take 1,000 mcg by mouth daily  9/25/2022 at Unknown time       Information source(s): Patient and CareEverywhere/SureScripts  Method of interview communication: in-person    Summary of Changes to PTA Med List  New: Tizanidine  Discontinued: Flexeril  Changed: Gabapentin 300mg BID to TID    Patient was asked about OTC/herbal products specifically.  PTA med list reflects this.    In the past week, patient estimated taking medication this percent of the time:  greater than 90%.    Allergies were reviewed, assessed, and updated with the patient.      Patient does not use any multi-dose medications prior to admission.    The information provided in this note is only as accurate as the sources available at the time of the update(s).    Thank you for the opportunity to participate in the care of this patient.    Saundra Fry [facility]  9/26/2022 10:48 PM

## 2022-09-27 NOTE — PROGRESS NOTES
Hospitalist Progress Note        CODE STATUS:  Full Code    Assessment/Plan:  Reggie Gomes is a 77 year old male with history of CAD, HTN, HLD, prostrate cancer, bladder cancer, cervical spine stenosis, L4-L5 Laminectomy/fusion, chronic LBP, right sided foot drop, urinary incontinence and GERD who presented for evaluation of generalized weakness and episode of fall.    Generalized weakness, fall  - Assisted fall with a grocery cart, no head trauma or loss of consciousness  - Right lower extremity weakness on exam, history of foot drop after cervical spine surgery  - MRI lumbar spine showed similar changes from 2017, however L2-L3 moderate narrowing of the central canal mildly progressed, mild to moderate right foraminal narrowing and moderate left foraminal narrowing progressed from prior MRI.  L5-S1 prominent left-sided facet arthropathy with a new left-sided facet effusion.  - PT/OT evaluation and treatment  - Fall precuations    Acute cystitis without hematuria  - Complains of dysuria and has had urine incontinence  - Continue IV ceftriaxone & follow-up urine and blood cultures     Acute bronchitis  - Productive cough over the last 10 days  - Clear lungs on CT  - Supportive management, Mucinex     Hypomagnesemia  - Replace with IV     Daily alcohol use  - Patient reports drinking 5 glasses of wine daily  - Monitor with CIWA  - Continue multivitamin and folic acid     Chronic macrocytic anemia  - Continue PTA vitamin B12     Thrombocytopenia, chronic  - Platelet count slightly dropped from previous  - Outpatient follow-up     Coronary artery disease  - Denies angina symptoms  - Continue PTA aspirin, Tylenol     Essential hypertension  - Continue PTA atenolol, lisinopril     Peripheral neuropathy, chronic low back pain  - Continue PTA gabapentin, acetaminophen, duloxetine, tizanidine     GERD  - Continue PTA omeprazole     Morbid obesity  - BMI 42.27. Lifestyle modifications advised.      DVT  Prophylaxis:    Disposition/Barrier to discharge; Discharge in 1-2 days. Home vs TCU pending PT/OT eval.      Subjective:  Interval History:   Patient seen and examined.   He's feeling well.     Review of Systems:   As mentioned in subjective.    Patient Active Problem List   Diagnosis     Dyslipidemia     Coronary Artery Disease     Prostate cancer (H)     Hydrocele, right     Ventral incisional hernia     Hip pain     Osteoarthritis     OA (osteoarthritis) of hip     Cellulitis of left upper extremity     Abnormality of gait due to impairment of balance     Acromioclavicular joint arthritis     Cervical stenosis of spine     Acute bilateral low back pain without sciatica     Dyslipidemia     H/O heart artery stent     History of bladder cancer     History of prostate cancer     Right foot drop     Generalized muscle weakness     Lumbar radiculopathy     Complete tear of rotator cuff     Cough     Acute cystitis without hematuria     Acute bronchitis due to other specified organisms       Scheduled Meds:    aspirin  81 mg Oral Daily     atenolol  25 mg Oral Daily     atorvastatin  40 mg Oral QAM     calcium carbonate 500 mg (elemental)  500 mg Oral Daily     cyanocobalamin  1,000 mcg Oral Daily     DULoxetine  60 mg Oral BID     folic acid  1 mg Oral Daily     gabapentin  300 mg Oral TID     guaiFENesin  600 mg Oral BID     lidocaine  1 patch Transdermal Once     lisinopril  5 mg Oral Daily     multivitamin w/minerals  1 tablet Oral Daily     pantoprazole  40 mg Oral Daily     psyllium  4 capsule Oral Daily     tamsulosin  0.8 mg Oral Daily     thiamine  50 mg Oral Daily     Continuous Infusions:  PRN Meds:.acetaminophen **OR** acetaminophen, acetaminophen, flumazenil, OLANZapine zydis **OR** haloperidol lactate, LORazepam **OR** LORazepam, melatonin, ondansetron **OR** ondansetron, tiZANidine    Objective:  Vital signs in last 24 hours:  Temp:  [97.8  F (36.6  C)-102.8  F (39.3  C)] 97.8  F (36.6  C)  Pulse:   [57-98] 73  Resp:  [17-49] 40  BP: (100-149)/(55-92) 107/55  SpO2:  [93 %-99 %] 95 %        Intake/Output Summary (Last 24 hours) at 9/27/2022 1001  Last data filed at 9/27/2022 0158  Gross per 24 hour   Intake 1600 ml   Output 75 ml   Net 1525 ml       Physical Exam:  General: In NAD  HEENT: NCAT & EOMI  CV: Normal S1S2, regular rhythm, normal rate  Lungs: CTAB  Abdomen: Soft, NT, ND, +BS  Extremities: No LE edema b/l  Neuro: AAOx3    Lab Results:    Recent Results (from the past 24 hour(s))   ECG 12-LEAD WITH MUSE (LHE)    Collection Time: 09/26/22  4:27 PM   Result Value Ref Range    Systolic Blood Pressure  mmHg    Diastolic Blood Pressure  mmHg    Ventricular Rate 91 BPM    Atrial Rate 91 BPM    SD Interval 156 ms    QRS Duration 74 ms     ms    QTc 425 ms    P Axis 55 degrees    R AXIS -11 degrees    T Axis 84 degrees    Interpretation ECG        Poor data quality, interpretation may be adversely affected  Sinus rhythm  Low voltage QRS  Borderline ECG  No previous ECGs available  Confirmed by SEE ED PROVIDER NOTE FOR, ECG INTERPRETATION ([id number]),  COLLEEN MAYORGA ([id number]) on 9/27/2022 8:10:36 AM     Asymptomatic COVID-19 Virus (Coronavirus) by PCR Nasopharyngeal    Collection Time: 09/26/22  5:30 PM    Specimen: Nasopharyngeal; Swab   Result Value Ref Range    SARS CoV2 PCR Negative Negative   Basic metabolic panel    Collection Time: 09/26/22  5:31 PM   Result Value Ref Range    Sodium 138 136 - 145 mmol/L    Potassium 4.3 3.4 - 5.3 mmol/L    Chloride 100 98 - 107 mmol/L    Carbon Dioxide (CO2) 27 22 - 29 mmol/L    Anion Gap 11 7 - 15 mmol/L    Urea Nitrogen 12.6 8.0 - 23.0 mg/dL    Creatinine 0.95 0.67 - 1.17 mg/dL    Calcium 8.9 8.8 - 10.2 mg/dL    Glucose 99 70 - 99 mg/dL    GFR Estimate 83 >60 mL/min/1.73m2   Hepatic function panel    Collection Time: 09/26/22  5:31 PM   Result Value Ref Range    Protein Total 6.9 6.4 - 8.3 g/dL    Albumin 4.0 3.5 - 5.2 g/dL    Bilirubin Total 1.9 (H) <=1.2 mg/dL     Alkaline Phosphatase 51 40 - 129 U/L    AST 19 10 - 50 U/L    ALT 21 10 - 50 U/L    Bilirubin Direct 0.35 (H) 0.00 - 0.30 mg/dL   Lipase    Collection Time: 09/26/22  5:31 PM   Result Value Ref Range    Lipase 9 (L) 13 - 60 U/L   INR    Collection Time: 09/26/22  5:31 PM   Result Value Ref Range    INR 1.10 0.85 - 1.15   Lactic acid whole blood    Collection Time: 09/26/22  5:31 PM   Result Value Ref Range    Lactic Acid 1.3 0.7 - 2.0 mmol/L   Magnesium    Collection Time: 09/26/22  5:31 PM   Result Value Ref Range    Magnesium 1.5 (L) 1.7 - 2.3 mg/dL   TSH with free T4 reflex    Collection Time: 09/26/22  5:31 PM   Result Value Ref Range    TSH 1.26 0.30 - 4.20 uIU/mL   CBC with platelets and differential    Collection Time: 09/26/22  5:31 PM   Result Value Ref Range    WBC Count 5.0 4.0 - 11.0 10e3/uL    RBC Count 3.78 (L) 4.40 - 5.90 10e6/uL    Hemoglobin 12.9 (L) 13.3 - 17.7 g/dL    Hematocrit 39.5 (L) 40.0 - 53.0 %     (H) 78 - 100 fL    MCH 34.1 (H) 26.5 - 33.0 pg    MCHC 32.7 31.5 - 36.5 g/dL    RDW 13.4 10.0 - 15.0 %    Platelet Count 116 (L) 150 - 450 10e3/uL    % Neutrophils 84 %    % Lymphocytes 8 %    % Monocytes 7 %    % Eosinophils 1 %    % Basophils 0 %    % Immature Granulocytes 0 %    NRBCs per 100 WBC 0 <1 /100    Absolute Neutrophils 4.2 1.6 - 8.3 10e3/uL    Absolute Lymphocytes 0.4 (L) 0.8 - 5.3 10e3/uL    Absolute Monocytes 0.4 0.0 - 1.3 10e3/uL    Absolute Eosinophils 0.0 0.0 - 0.7 10e3/uL    Absolute Basophils 0.0 0.0 - 0.2 10e3/uL    Absolute Immature Granulocytes 0.0 <=0.4 10e3/uL    Absolute NRBCs 0.0 10e3/uL   Adult Type and Screen    Collection Time: 09/26/22  5:31 PM   Result Value Ref Range    ABO/RH(D) A POS     Antibody Screen Negative Negative    SPECIMEN EXPIRATION DATE [id number]    CRP inflammation    Collection Time: 09/26/22  5:31 PM   Result Value Ref Range    CRP Inflammation 68.20 (H) <5.00 mg/L   Procalcitonin    Collection Time: 09/26/22  5:31 PM   Result  Value Ref Range    Procalcitonin 0.12 (H) <0.05 ng/mL   Phosphorus    Collection Time: 09/26/22  5:31 PM   Result Value Ref Range    Phosphorus 2.3 (L) 2.5 - 4.5 mg/dL   UA with Microscopic reflex to Culture    Collection Time: 09/26/22  9:54 PM    Specimen: Urine, Clean Catch   Result Value Ref Range    Color Urine Light Yellow Colorless, Straw, Light Yellow, Yellow    Appearance Urine Clear Clear    Glucose Urine Negative Negative mg/dL    Bilirubin Urine Negative Negative    Ketones Urine Negative Negative mg/dL    Specific Gravity Urine >1.050 (H) 1.001 - 1.030    Blood Urine Negative Negative    pH Urine 5.5 5.0 - 7.0    Protein Albumin Urine 10  (A) Negative mg/dL    Urobilinogen Urine <2.0 <2.0 mg/dL    Nitrite Urine Negative Negative    Leukocyte Esterase Urine 250 Aaliyah/uL (A) Negative    RBC Urine 1 <=2 /HPF    WBC Urine 23 (H) <=5 /HPF    Squamous Epithelials Urine 1 <=1 /HPF   Magnesium Lab    Collection Time: 09/27/22  8:06 AM   Result Value Ref Range    Magnesium 1.6 (L) 1.7 - 2.3 mg/dL       Serum Glucose range:   Recent Labs   Lab 09/26/22  1731   GLC 99     ABG: No lab results found in last 7 days.  CBC:   Recent Labs   Lab 09/26/22  1731   WBC 5.0   HGB 12.9*   HCT 39.5*   *   *   NEUTROPHIL 84   LYMPH 8   MONOCYTE 7   EOSINOPHIL 1     Chemistry:   Recent Labs   Lab 09/27/22  0806 09/26/22  1731   NA  --  138   POTASSIUM  --  4.3   CHLORIDE  --  100   CO2  --  27   BUN  --  12.6   CR  --  0.95   GFRESTIMATED  --  83   NEDA  --  8.9   MAG 1.6* 1.5*   PROTTOTAL  --  6.9   ALBUMIN  --  4.0   AST  --  19   ALT  --  21   ALKPHOS  --  51   BILITOTAL  --  1.9*     Coags:  Recent Labs   Lab 09/26/22  1731   INR 1.10     Cardiac Markers:  No results for input(s): CKTOTAL, TROPONINI in the last 168 hours.               09/27/2022   Charlene Garcia MD  Hospitalist  Pager: [phone]

## 2022-09-27 NOTE — PHARMACY-VANCOMYCIN DOSING SERVICE
Pharmacy Vancomycin Initial Note  Date of Service 2022  Patient's  1945  77 year old, male    Indication: Sepsis and Urinary Tract Infection    Current estimated CrCl = Estimated Creatinine Clearance: 77.7 mL/min (based on SCr of 0.95 mg/dL).    Creatinine for last 3 days  2022:  5:31 PM Creatinine 0.95 mg/dL    Recent Vancomycin Level(s) for last 3 days  No results found for requested labs within last 72 hours.      Vancomycin IV Administrations (past 72 hours)      No vancomycin orders with administrations in past 72 hours.                Nephrotoxins and other renal medications (From now, onward)    Start     Dose/Rate Route Frequency Ordered Stop    22 223  vancomycin (VANCOCIN) 2,000 mg in sodium chloride 0.9 % 500 mL intermittent infusion         2,000 mg  over 2 Hours Intravenous ONCE 22 2227            Contrast Orders - past 72 hours (72h ago, onward)    Start     Dose/Rate Route Frequency Stop    22  iopamidol (ISOVUE-370) solution 100 mL         100 mL Intravenous ONCE 22 1950              Plan:  1. Start vancomycin 2000 mg IV x 1    Saundra Fry, [facility]

## 2022-09-27 NOTE — PLAN OF CARE
PRIMARY DIAGNOSIS: GENERALIZED WEAKNESS    OUTPATIENT/OBSERVATION GOALS TO BE MET BEFORE DISCHARGE  1. Orthostatic performed: No    2. Tolerating PO medications: Yes    3. Return to near baseline physical activity: yes    4. Cleared for discharge by consultants (if involved): No    Discharge Planner Nurse   Safe discharge environment identified: Yes  Barriers to discharge: Yes       Entered by: Charlene Sarah RN 09/27/2022 8:04 AM     Please review provider order for any additional goals.   Nurse to notify provider when observation goals have been met and patient is ready for discharge.Goal Outcome Evaluation:    Back pain improved.  Some difficulty getting up and down from cart due to height.  Eating and drinking well. Thinks he will be going home when his wife gets here this am. Charlene Sarah RN

## 2022-09-27 NOTE — CONSULTS
Care Management Initial Consult    General Information  Assessment completed with: Patient,    Type of CM/SW Visit: Initial Assessment    Primary Care Provider verified and updated as needed: Yes   Readmission within the last 30 days: no previous admission in last 30 days         Advance Care Planning:            Communication Assessment  Patient's communication style: spoken language (English or Bilingual)             Cognitive  Cognitive/Neuro/Behavioral: WDL                      Living Environment:   People in home: spouse     Current living Arrangements: house      Able to return to prior arrangements: yes       Family/Social Support:  Care provided by: self  Provides care for: no one  Marital Status:   Wife          Description of Support System: Supportive, Involved         Current Resources:   Patient receiving home care services: No     Community Resources: None  Equipment currently used at home: cane, straight  Supplies currently used at home: None    Employment/Financial:  Employment Status:          Financial Concerns:             Lifestyle & Psychosocial Needs:  Social Determinants of Health     Tobacco Use: Medium Risk     Smoking Tobacco Use: Former Smoker     Smokeless Tobacco Use: Never Used   Alcohol Use: Not on file   Financial Resource Strain: Not on file   Food Insecurity: Not on file   Transportation Needs: Not on file   Physical Activity: Not on file   Stress: Not on file   Social Connections: Not on file   Intimate Partner Violence: Not on file   Depression: Not on file   Housing Stability: Not on file       Functional Status:  Prior to admission patient needed assistance:   Dependent ADLs:: Ambulation-cane  Dependent IADLs:: Independent                  Additional Information:    Assessment completed with patient. Patient states he is independent with ADLs and IADLs. He ambulates with a cane, lives in his house with spouse and drives. 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 Stated  discharge goal is to return home.  Family will transport.     Rosamaria Malone RN

## 2022-09-27 NOTE — PLAN OF CARE
Occupational Therapy Discharge Summary    Reason for therapy discharge:    All goals and outcomes met, no further needs identified.    Progress towards therapy goal(s). See goals on Care Plan in [facility] electronic health record for goal details.  Goals met       Thelma RIVERA, OTR/L, CLT 9/27/2022 , 3:56 PM

## 2022-09-27 NOTE — PLAN OF CARE
[facility]      OUTPATIENT PHYSICAL THERAPY EVALUATION  PLAN OF TREATMENT FOR OUTPATIENT REHABILITATION  (COMPLETE FOR INITIAL CLAIMS ONLY)  Patient's Last Name, First Name, M.I.  YOB: 1945  Reggie Gomes                        Provider's Name  [facility] Medical Record No.  [id number]                               Onset Date:  09/26/22   Start of Care Date:  09/27/22      Type:     _X_PT   ___OT   ___SLP Medical Diagnosis:  Generalized muscle weakness                        PT Diagnosis:  Impaired functional mobility   Visits from SOC:  1   _________________________________________________________________________________  Plan of Treatment/Functional Goals    Planned Interventions: gait training, patient/family education, stair training, strengthening     Goals: See Physical Therapy Goals on Care Plan in [employer] electronic health record.    Therapy Frequency: Daily  Predicted Duration of Therapy Intervention: 10/04/22  _________________________________________________________________________________    I CERTIFY THE NEED FOR THESE SERVICES FURNISHED UNDER        THIS PLAN OF TREATMENT AND WHILE UNDER MY CARE     (Physician co-signature of this document indicates review and certification of the therapy plan).              Certification date from: 09/27/22, Certification date to: 10/04/22    Referring Physician: Gurpreet Mcpherson MD            Initial Assessment        See Physical Therapy evaluation dated 09/27/22 in Epic electronic health record.

## 2022-09-27 NOTE — PLAN OF CARE
PRIMARY DIAGNOSIS: GENERALIZED WEAKNESS    OUTPATIENT/OBSERVATION GOALS TO BE MET BEFORE DISCHARGE  1. Orthostatic performed: Yes:                        2. Tolerating PO medications: Yes    3. Return to near baseline physical activity: Yes    4. Cleared for discharge by consultants (if involved): No    Discharge Planner Nurse   Safe discharge environment identified: Yes  Barriers to discharge: Yes       Entered by: Charlene Sarah RN 09/27/2022 12:36 PM     Please review provider order for any additional goals.   Nurse to notify provider when observation goals have been met and patient is ready for discharge.Goal Outcome Evaluation:     Back pain has resolved. No fever currently.  Up with stand by assist, states he feels that he is at his baseline for mobility.  Charlene Sarah RN          Problem: Fall Injury Risk  Goal: Absence of Fall and Fall-Related Injury  9/27/2022 1236 by Charlene Sarah RN  Outcome: Ongoing, Progressing  9/27/2022 0803 by Charlene Sarah RN  Outcome: Ongoing, Progressing  Intervention: Promote Injury-Free Environment  Recent Flowsheet Documentation  Taken 9/27/2022 1130 by Charlene Sarah RN  Safety Promotion/Fall Prevention: fall prevention program maintained  Taken 9/27/2022 0800 by Charlene Sarah RN  Safety Promotion/Fall Prevention: fall prevention program maintained

## 2022-09-27 NOTE — PROGRESS NOTES
[facility]      OUTPATIENT OCCUPATIONAL THERAPY  EVALUATION  PLAN OF TREATMENT FOR OUTPATIENT REHABILITATION  (COMPLETE FOR INITIAL CLAIMS ONLY)  Patient's Last Name, First Name, M.I.  YOB: 1945  EliseoReggie NARVAEZ                          Provider's Name  [facility] Medical Record No.  [id number]                               Onset Date:  09/26/22   Start of Care Date:        Type:     ___PT   _X_OT   ___SLP Medical Diagnosis:  (P) Fall                        OT Diagnosis:      Visits from SOC:  1   _________________________________________________________________________________  Plan of Treatment/Functional Goals    Planned Interventions:     Goals: See Occupational Therapy Goals on Care Plan in [facility] electronic health record.    Therapy Frequency:    Predicted Duration of Therapy Intervention:    _________________________________________________________________________________    I CERTIFY THE NEED FOR THESE SERVICES FURNISHED UNDER        THIS PLAN OF TREATMENT AND WHILE UNDER MY CARE     (Physician co-signature of this document indicates review and certification of the therapy plan).               ,      Referring Physician: Dr. Mcpherson            Initial Assessment        See Occupational Therapy evaluation dated   in Epic electronic health record.

## 2022-09-27 NOTE — PROGRESS NOTES
"   09/27/22 1555   Quick Adds   Quick Adds Certification   Type of Visit Initial PT Evaluation   Living Environment   People in Home spouse   Current Living Arrangements house  (Twin home)   Home Accessibility stairs to enter home   Number of Stairs, Main Entrance 3   Stair Railings, Main Entrance railings safe and in good condition;railing on right side (ascending)   Transportation Anticipated family or friend will provide   Living Environment Comments Patient has 3 stairs to enter, then kitchen, bathroom, bedroom, laundry are all on one level   Self-Care   Usual Activity Tolerance good   Current Activity Tolerance good   Equipment Currently Used at Home cane, straight   Fall history within last six months yes   Number of times patient has fallen within last six months 1   Activity/Exercise/Self-Care Comment Patient reports that he is the caregiver for his wife. Uses a SEC in the community, does not use an AD in his home. Does have an AFO due to history of R foot drop, but does not wear it because \"it doesn't look good with shorts\".   General Information   Onset of Illness/Injury or Date of Surgery 09/26/22   Referring Physician Gurpreet Mcpherson MD   Patient/Family Therapy Goals Statement (PT) To go home   Pertinent History of Current Problem (include personal factors and/or comorbidities that impact the POC) Reggie Gomes is a 77 year old male who was brought to the emergency department by ambulance for evaluation of above chief complaint.  Past medical history of coronary artery disease stent, essential hypertension, hyperlipidemia, prostate cancer, bladder cancer, cervical spine stenosis, L4-5 laminectomy and fusion, chronic low back pain, right-sided foot drop, urine incontinence, GERD.  Patient reports a productive cough over the last 10 days but does not expectorate and denies sore throat, nasal congestion, fevers or chills.  He has had some urinary incontinence for several months and most recently " complained of dysuria.  Generally, he describes poor appetite for a couple years but not significant weight changes.  He denies tobacco or drugs but drinks 5 glasses of wine on a daily basis.  He uses a cane for ambulation and lives in a house with his wife.  He was at the grocery store, about two thirds into his errands when he felt generally weak.  He described weakness of the right leg first and then the left leg.  He fell backwards onto his bottom but assisted the fall with the cart.  He denied head trauma or loss of consciousness.  However, he was too weak to ambulate and EMS was called.  Patient denies chest pain, shortness of breath or palpitations.  MRI lumbar spine showed findings as below.  He failed a road test and was going to be kept overnight for observation.  However, temperature was obtained after he came back from radiology and was noted to be febrile.  Patient had a similar presentation about 1 year ago with generalized weakness related to sepsis from skin infection.   Existing Precautions/Restrictions fall   Cognition   Affect/Mental Status (Cognition) WNL   Orientation Status (Cognition) oriented x 4   Follows Commands (Cognition) WNL   Range of Motion (ROM)   Range of Motion ROM is WFL;ROM deficits secondary to weakness   ROM Comment LLE WFL, R ankle limited due to weakness, patient has history of R foot drop   Strength (Manual Muscle Testing)   Strength (Manual Muscle Testing) strength is WFL   Strength Comments B hips 3/5, B knees 5/5, R ankle less than 3/5, L ankle 4/5   Bed Mobility   Bed Mobility supine-sit;sit-supine   Supine-Sit Dixon (Bed Mobility) supervision   Sit-Supine Dixon (Bed Mobility) supervision   Bed Mobility Limitations decreased ability to use arms for pushing/pulling;decreased ability to use legs for bridging/pushing   Impairments Contributing to Impaired Bed Mobility decreased strength   Transfers   Transfers sit-stand transfer   Transfer Safety Concerns  Noted decreased step length   Impairments Contributing to Impaired Transfers decreased strength;impaired balance   Sit-Stand Transfer   Sit-Stand Mora (Transfers) contact guard;1 person to manage equipment   Assistive Device (Sit-Stand Transfers) cane, straight   Gait/Stairs (Locomotion)   Mora Level (Gait) contact guard;1 person to manage equipment   Assistive Device (Gait) other (see comments)  (None)   Distance in Feet (Required for LE Total Joints) 50   Pattern (Gait) step-to   Deviations/Abnormal Patterns (Gait) base of support, wide;gait speed decreased;stride length decreased;right sided deviations   Right Sided Gait Deviations foot drop/toe drag   Balance   Balance Comments Generally unsteady during gait. Sitting balance good, static standing balance good.   Sensory Examination   Sensory Perception patient reports no sensory changes   Sensory Perception Comments Patient reports history of neuropathy, light touch sensation within functional limits in B feet   Clinical Impression   Criteria for Skilled Therapeutic Intervention Yes, treatment indicated   PT Diagnosis (PT) Impaired functional mobility   Influenced by the following impairments Weakness   Functional limitations due to impairments Gait, stairs   Clinical Presentation (PT Evaluation Complexity) Stable/Uncomplicated   Clinical Presentation Rationale Patient presents as medically diagnosed   Clinical Decision Making (Complexity) low complexity   Planned Therapy Interventions (PT) gait training;patient/family education;stair training;strengthening   Anticipated Equipment Needs at Discharge (PT) cane, straight   Risk & Benefits of therapy have been explained evaluation/treatment results reviewed;care plan/treatment goals reviewed;participants voiced agreement with care plan;participants included;patient   PT Discharge Planning   PT Discharge Recommendation (DC Rec) home with assist;home with home care physical therapy   PT Rationale for  DC Rec Patient currently requires assist of 1 for safe gait. He lives in a twin home with his wife who is unable to provide physical assistance if needed. He has access to a single end cane at home and uses it for ambulation out in the community. Patient has 3 steps to enter his home, at this time he has not attempted stairs. Anticipate patient will be safe to discharge home with use of a single end cane and his AFO. Patient may benefit from home PT in order to improve strength and overall safety with functional mobility pending progress.   Therapy Certification   Start of care date 09/27/22   Certification date from 09/27/22   Certification date to 10/04/22   Medical Diagnosis Generalized muscle weakness   Total Evaluation Time   Total Evaluation Time (Minutes) 15   Physical Therapy Goals   PT Frequency Daily   PT Predicted Duration/Target Date for Goal Attainment 10/04/22   PT Goals Gait;Stairs   PT: Gait Modified independent;Assistive device;Greater than 200 feet   PT: Stairs Supervision/stand-by assist;3 stairs;Rail on both sides     Siobhan Delgado DPT

## 2022-09-27 NOTE — ED NOTES
Pt unable to ambulated. Pt unable to sit up by the side of the bed. Cleaned up pt with one assist.

## 2022-09-27 NOTE — H&P
[facility]    History and Physical - Hospitalist Service       Date of Admission:  9/26/2022    Assessment & Plan      Reggie Gomes is a 77 year old male admitted on 9/26/2022. He was brought to the ED by ambulance after an episode of generalized weakness and fall.    1.  Generalized weakness, fall  Assisted fall with a grocery cart, no head trauma or loss of consciousness  Right lower extremity weakness on exam, history of foot drop after cervical spine surgery  MRI lumbar spine showed similar changes from 2017, however L2-L3 moderate narrowing of the central canal mildly progressed, mild to moderate right foraminal narrowing and moderate left foraminal narrowing progressed from prior MRI.  L5-S1 prominent left-sided facet arthropathy with a new left-sided facet effusion.  Supportive management  PT/OT evaluation and treatment    2.  Acute cystitis without hematuria  Complains of dysuria and has had urine incontinence  Sepsis criteria not met  Continue IV ceftriaxone  Follow-up urine and blood cultures    3.  Acute bronchitis  Productive cough over the last 10 days  Clear lungs on CT  Rhonchi noted on the right side  Supportive management, Mucinex    4.  Hypomagnesemia  Replace per protocol    5.  Daily alcohol use  Patient reports drinking 5 glasses of wine daily  Monitor with CIWA  Add multivitamin and folic acid    6.  Chronic macrocytic anemia  Continue PTA vitamin B12    7.  Thrombocytopenia, chronic  Platelet count slightly dropped from previous  Outpatient follow-up    8.  Coronary artery disease  Denies angina symptoms  Continue PTA aspirin, Tylenol    9.  Essential hypertension  Continue PTA atenolol, lisinopril    10.  Peripheral neuropathy, chronic low back pain  Continue PTA gabapentin, acetaminophen, duloxetine, tizanidine    11.  GERD  Continue PTA omeprazole    12.  Morbid obesity  BMI 42.27     Diet: Combination Diet Low Saturated Fat Na <2400mg Diet    DVT Prophylaxis:  "Ambulate every shift  Ferrer Catheter: Not present  Central Lines: None  Cardiac Monitoring: None  Code Status: Full Code      Clinically Significant Risk Factors Present on Admission           # Hypomagnesemia: Mg = 1.5 mg/dL (Ref range: 1.7 - 2.3 mg/dL) on admission, will replace as needed      # Thrombocytopenia: Plts = 116 10e3/uL (Ref range: 150 - 450 10e3/uL) on admission, will monitor for bleeding  # Hypertension: home medication list includes antihypertensive(s)    # Severe Obesity: Estimated body mass index is 42.27 kg/m  as calculated from the following:    Height as of this encounter: 1.651 m (5' 5\").    Weight as of this encounter: 115.2 kg (254 lb).        Disposition Plan      Expected Discharge Date: 09/27/2022                The patient's care was discussed with the Patient.    Gurpreet Mcpherson MD  Hospitalist Service  [facility]  Securely message with the Vocera Web Console (learn more here)  Text page via [employer] Paging/Directory         ______________________________________________________________________    Chief Complaint   Weakness of lower extremities, fall    History is obtained from the patient, electronic health record and emergency department physician    History of Present Illness   Reggie Gomes is a 77 year old male who was brought to the emergency department by ambulance for evaluation of above chief complaint.  Past medical history of coronary artery disease stent, essential hypertension, hyperlipidemia, prostate cancer, bladder cancer, cervical spine stenosis, L4-5 laminectomy and fusion, chronic low back pain, right-sided foot drop, urine incontinence, GERD.  Patient reports a productive cough over the last 10 days but does not expectorate and denies sore throat, nasal congestion, fevers or chills.  He has had some urinary incontinence for several months and most recently complained of dysuria.  Generally, he describes poor appetite for a couple years but " not significant weight changes.  He denies tobacco or drugs but drinks 5 glasses of wine on a daily basis.  He uses a cane for ambulation and lives in a house with his wife.  He was at the grocery store, about two thirds into his errands when he felt generally weak.  He described weakness of the right leg first and then the left leg.  He fell backwards onto his bottom but assisted the fall with the cart.  He denied head trauma or loss of consciousness.  However, he was too weak to ambulate and EMS was called.  Patient denies chest pain, shortness of breath or palpitations.  MRI lumbar spine showed findings as below.  He failed a road test and was going to be kept overnight for observation.  However, temperature was obtained after he came back from radiology and was noted to be febrile.  Patient had a similar presentation about 1 year ago with generalized weakness related to sepsis from skin infection.    Review of Systems    The 10 point Review of Systems is negative other than noted in the HPI or here.     Past Medical History    I have reviewed this patient's medical history and updated it with pertinent information if needed.   Past Medical History:   Diagnosis Date     Arthritis     osteo     BPH (benign prostatic hyperplasia)      Cancer (H)     prostate and bladder     Coronary artery disease      GERD (gastroesophageal reflux disease)      Hydrocele      Hyperlipidemia      Hypertension      Lumbar spinal stenosis      Myocardial infarction (H) 2004     Prostate cancer (H)        Past Surgical History   I have reviewed this patient's surgical history and updated it with pertinent information if needed.  Past Surgical History:   Procedure Laterality Date     APPENDECTOMY       ARTHROPLASTY REVISION HIP Right 2014     BOWEL RESECTION      with colostomy and subsequent takedown of colostomy     COLON SURGERY       CORONARY ANGIOPLASTY  2004    1 stent     HERNIA REPAIR       LUMBAR SPINE SURGERY      X2      ORCHIECTOMY SCROTAL Right 10/27/2015    Procedure: SIMPLE RIGHT ORCHIECTOMY;  Surgeon: Mario Alberto Thompson MD;  Location: [facility] OR;  Service:      PROSTATE SURGERY       SMALL INTESTINE SURGERY       SPINE SURGERY       TOTAL HIP ARTHROPLASTY Right 2014     VASECTOMY       ZZC TOTAL HIP ARTHROPLASTY Left 2018    Procedure: LEFT, ARTHROPLASTY, TOTAL, TOTAL DIRECT ANTERIOR APPROACH;  Surgeon: Manuel Yap MD;  Location: [facility];  Service: Orthopedics       Social History   I have reviewed this patient's social history and updated it with pertinent information if needed.  Social History     Tobacco Use     Smoking status: Former Smoker     Quit date: 10/26/2005     Years since quittin.9     Smokeless tobacco: Never Used   Substance Use Topics     Alcohol use: Yes     Alcohol/week: 1.0 standard drink     Comment: Alcoholic Drinks/day: daily     Drug use: Yes     Types: Hydrocodone     Comment: Drug use: takes 1 tablet 4 times daily       Family History   I have reviewed this patient's family history and updated it with pertinent information if needed.  Family History   Problem Relation Age of Onset     Hypertension Father      Coronary Artery Disease Mother      Anesthesia Reaction No family hx of        Prior to Admission Medications   Prior to Admission Medications   Prescriptions Last Dose Informant Patient Reported? Taking?   CALCIUM CARBONATE (CALCIUM 500 ORAL) 2022 at Unknown time  Yes Yes   Sig: [CALCIUM CARBONATE (CALCIUM 500 ORAL)] Take 1 tablet by mouth daily.   DULoxetine (CYMBALTA) 60 MG capsule 2022 at Unknown time  Yes Yes   Sig: Take 60 mg by mouth 2 times daily   acetaminophen (TYLENOL) 500 MG tablet   No Yes   Sig: Take 2 tablets (1,000 mg) by mouth every 8 hours as needed for mild pain   aspirin (ASA) 81 MG chewable tablet 2022 at Unknown time  Yes Yes   Sig: Take 81 mg by mouth daily   atenolol (TENORMIN) 25 MG tablet 2022 at Unknown time   Yes Yes   Sig: Take 25 mg by mouth daily   atorvastatin (LIPITOR) 40 MG tablet 9/25/2022 at Unknown time  Yes Yes   Sig: [ATORVASTATIN (LIPITOR) 40 MG TABLET] Take 40 mg by mouth every morning.   gabapentin (NEURONTIN) 300 MG capsule 9/25/2022 at Unknown time  Yes Yes   Sig: Take 300 mg by mouth 3 times daily   lisinopril (PRINIVIL,ZESTRIL) 5 MG tablet 9/25/2022 at Unknown time  No Yes   Sig: [LISINOPRIL (PRINIVIL,ZESTRIL) 5 MG TABLET] Take 1 tablet (5 mg total) by mouth daily.   omeprazole (PRILOSEC) 20 MG capsule 9/25/2022 at Unknown time  Yes Yes   Sig: [OMEPRAZOLE (PRILOSEC) 20 MG CAPSULE] Take 20 mg by mouth daily.   psyllium (METAMUCIL) 0.52 gram capsule 9/25/2022 at Unknown time  Yes Yes   Sig: Take 2.08 g by mouth daily 4 caps qday   tamsulosin (FLOMAX) 0.4 mg Cp24 9/25/2022 at Unknown time  No Yes   Sig: [TAMSULOSIN (FLOMAX) 0.4 MG CP24] Take 1 capsule (0.4 mg total) by mouth 2 (two) times a day.   Patient taking differently: Take 0.8 mg by mouth daily   thiamine 50 MG TABS 9/25/2022 at Unknown time  Yes Yes   Sig: Take 50 mg by mouth daily Not sure of dose at this time.   tiZANidine (ZANAFLEX) 2 MG tablet   Yes Yes   Sig: Take 2 mg by mouth 3 times daily as needed for muscle spasms   vitamin B-12 (CYANOCOBALAMIN) 1000 MCG tablet 9/25/2022 at Unknown time  Yes Yes   Sig: Take 1,000 mcg by mouth daily      Facility-Administered Medications: None     Allergies   Allergies   Allergen Reactions     Barbiturates        Physical Exam   Vital Signs: Temp: (!) 102.8  F (39.3  C) Temp src: Oral BP: 138/79 Pulse: 87   Resp: (!) 32 SpO2: 96 % O2 Device: None (Room air)    Weight: 254 lbs 0 oz    Constitutional: awake and alert  Respiratory: no increased work of breathing, good air exchange and rhonchi right anterior  Cardiovascular: regular rate and rhythm and normal S1 and S2  GI: normal bowel sounds, soft and non-tender  Skin: no bruising or bleeding and no redness, warmth, or swelling  Musculoskeletal: no lower  extremity pitting edema present  there is no redness, warmth, or swelling of the joints  Neurologic: Mental Status Exam:  Level of Alertness:   awake  Orientation:   person, place, time  Motor Exam: Right lower extremity 4/5 strength, weak dorsi flexion and plantar flexion  Neuropsychiatric: General: normal, calm and normal eye contact  Affect: normal    Data   Data reviewed today: I reviewed all medications, new labs and imaging results over the last 24 hours. I personally reviewed the EKG tracing showing Sinus rhythm at 91 bpm, low voltage QRS, no previous EKG to compare.    Recent Labs   Lab 09/26/22  1731   WBC 5.0   HGB 12.9*   *   *   INR 1.10      POTASSIUM 4.3   CHLORIDE 100   CO2 27   BUN 12.6   CR 0.95   ANIONGAP 11   NEDA 8.9   GLC 99   ALBUMIN 4.0   PROTTOTAL 6.9   BILITOTAL 1.9*   ALKPHOS 51   ALT 21   AST 19   LIPASE 9*     Recent Results (from the past 24 hour(s))   CTA Chest Abdomen Pelvis w Contrast    Narrative    EXAM: CTA CHEST ABDOMEN PELVIS W CONTRAST  LOCATION: [facility]  DATE/TIME: 9/26/2022 7:49 PM    INDICATION: Question acute aortic syndrome, acute bilateral hip flexion weakness.  COMPARISON: None.  TECHNIQUE: CT angiogram chest abdomen pelvis during arterial phase of injection of IV contrast. 2D and 3D MIP reconstructions were performed by the CT technologist. Dose reduction techniques were used.   CONTRAST: isovue 370 100ml    FINDINGS:   CT ANGIOGRAM CHEST, ABDOMEN, AND PELVIS: No evidence of intramural hematoma. Tortuous atherosclerotic aorta. No evidence of dissection or aneurysm. Patent branch vessels.    LUNGS AND PLEURA: Normal.    MEDIASTINUM/AXILLAE: Normal size heart. No pericardial effusion. No hilar or mediastinal lymphadenopathy. Small hiatal hernia.    CORONARY ARTERY CALCIFICATION: Severe.    HEPATOBILIARY: Cholelithiasis.    PANCREAS: Normal.    SPLEEN: Normal.    ADRENAL GLANDS: Normal.    KIDNEYS/BLADDER: Normal.    BOWEL:  Status post sigmoidectomy with colocolic anastomosis. Scattered diverticulosis. No obstruction, colitis, or diverticulitis.    LYMPH NODES: Normal.    PELVIC ORGANS: Normal.    MUSCULOSKELETAL: L4-L5 posterior fusion. Bilateral total hip arthroplasty.      Impression    IMPRESSION:  1.  No evidence of aortic aneurysm or dissection.  2.  Mildly enlarged heart with coronary artery disease and atherosclerotic vascular disease.  3.  Status post partial sigmoidectomy with anastomosis. Scattered diverticulosis. No obstruction, colitis, or diverticulitis.  4.  Cholelithiasis.     Lumbar spine MRI w/o contrast    Narrative    EXAM: MR LUMBAR SPINE W/O CONTRAST  LOCATION: [facility]  DATE/TIME: 9/26/2022 7:56 PM    INDICATION: New-onset bilateral lower extremity weakness. Previous lumbar fusion.  COMPARISON: CT 11/24/2021. MRI 11/15/2017 from [facility].  TECHNIQUE: Routine Lumbar Spine MRI without IV contrast. Motion artifact.    FINDINGS:   Nomenclature is based on 5 lumbar type vertebral bodies. Accentuated lumbar lordosis normal vertebral body heights and marrow pattern. Stable mild degenerative retrolisthesis L1 on L2 and L2 on L3. Grossly stable appearing posterior instrumented L4-L5   fusion and laminectomy changes. Some artifact from the hardware. Normal distal spinal cord and cauda equina with conus medullaris at L1-L2. Paraspinal muscle atrophy. Artifact from bilateral hip arthroplasties.    T12-L1: Normal disc height with mild loss of disc signal. No herniation. Mild facet arthropathy. No spinal canal narrowing or neural foraminal narrowing.     L1-L2: Moderate loss of disc height and signal with chronic Schmorl's node deformity inferior endplate of L1 posteriorly. There is a shallow broad-based disc and osteophyte complex at this level. Mild to moderate facet arthropathy. Mild central canal   narrowing similar to prior. Mild right foraminal narrowing and mild to moderate left foraminal  narrowing. These changes are slightly progressed on the left since prior MRI.    L2-L3: Moderate loss of disc height and signal mildly progressed from prior. Shallow broad-based disc and osteophyte complex. Moderate facet arthropathy. Moderate narrowing of the central spinal canal at this level mildly progressed from prior MRI. Some   narrowing of both lateral recesses. Mild to moderate right foraminal narrowing and moderate left foraminal narrowing both progressed from prior.     L3-L4: Moderate loss of disc height and signal. Prominent facet arthropathy. There may have been prior laminectomies at this level. Moderate central canal narrowing similar to prior MRI. Prominent narrowing of both L3-L4 foramen right greater than left   similar to prior MRI.    L4-L5: Mild loss of disc height and signal with previous posterior instrumented fusion changes and laminectomy changes again seen. Some artifact from the hardware. Central canal is adequate. Both neural foramen at this level appear adequate.    L5-S1: Normal disc height with mild loss of disc signal. Prominent facet arthropathy left greater than right with new left facet effusion. Shallow broad-based disc and osteophyte complex. Central canal remains adequate. Prominent left lateral recess   narrowing increased from prior MRI. Mild right foraminal narrowing. Moderate left foraminal narrowing.      Impression    IMPRESSION:  1.  Motion degraded exam.  2.  Again seen are previous posterior instrumented L4-L5 fusion changes and laminectomy changes. Laminectomy changes may also involve the L3-L4 level.  3.  Vertebral body heights and lateral lumbar alignment similar to prior MRI 11/15/2017 with low-grade retrolisthesis L1 on L2 and L2 on L3.  4.  At the L2-L3 level there is a shallow broad-based disc and osteophyte complex with moderate facet arthropathy and hypertrophy. Moderate narrowing of the central canal, mildly progressed from previous MRI. Some narrowing of  both lateral recesses. Mild   to moderate right foraminal narrowing and moderate left foraminal narrowing both progressed from prior MRI.  5.  At that L3-L4 level there is a shallow broad-based disc and osteophyte complex. There may have been prior laminectomies. Prominent facet arthropathy at this level. Overall central canal is moderately narrowed similar to previous MRI. Prominent   narrowing of both L3-L4 foramen right greater than left similar to prior MRI.  6.  At the L4-L5 level where there are fusion and laminectomy changes and the central canal remains adequate and both neural foramen at this level remain adequate.  7.  At the L5-S1 level there is prominent left-sided facet arthropathy with a new left-sided facet effusion. There is a shallow disc and osteophyte complex. Central canal adequate with prominent left lateral recess narrowing increased from prior MRI.   Mild right foraminal narrowing and moderate left foraminal narrowing.

## 2022-09-28 ENCOUNTER — APPOINTMENT (OUTPATIENT)
Dept: PHYSICAL THERAPY | Facility: HOSPITAL | Age: 77
DRG: 690 | End: 2022-09-28
Payer: MEDICARE

## 2022-09-28 PROBLEM — R50.9 FEVER, UNSPECIFIED FEVER CAUSE: Status: ACTIVE | Noted: 2022-09-28

## 2022-09-28 PROBLEM — M48.061 SPINAL STENOSIS OF LUMBAR REGION, UNSPECIFIED WHETHER NEUROGENIC CLAUDICATION PRESENT: Status: ACTIVE | Noted: 2022-09-28

## 2022-09-28 PROBLEM — Z97.8 FOLEY CATHETER IN PLACE: Status: ACTIVE | Noted: 2022-09-28

## 2022-09-28 LAB
ANION GAP SERPL CALCULATED.3IONS-SCNC: 9 MMOL/L (ref 7–15)
BACTERIA UR CULT: NORMAL
BASOPHILS # BLD AUTO: 0 10E3/UL (ref 0–0.2)
BASOPHILS NFR BLD AUTO: 0 %
BUN SERPL-MCNC: 12.9 MG/DL (ref 8–23)
CALCIUM SERPL-MCNC: 8.1 MG/DL (ref 8.8–10.2)
CHLORIDE SERPL-SCNC: 105 MMOL/L (ref 98–107)
CREAT SERPL-MCNC: 0.85 MG/DL (ref 0.67–1.17)
DEPRECATED HCO3 PLAS-SCNC: 25 MMOL/L (ref 22–29)
EOSINOPHIL # BLD AUTO: 0.1 10E3/UL (ref 0–0.7)
EOSINOPHIL NFR BLD AUTO: 2 %
ERYTHROCYTE [DISTWIDTH] IN BLOOD BY AUTOMATED COUNT: 13.5 % (ref 10–15)
GFR SERPL CREATININE-BSD FRML MDRD: 90 ML/MIN/1.73M2
GLUCOSE SERPL-MCNC: 105 MG/DL (ref 70–99)
HCT VFR BLD AUTO: 33.6 % (ref 40–53)
HGB BLD-MCNC: 11.3 G/DL (ref 13.3–17.7)
IMM GRANULOCYTES # BLD: 0 10E3/UL
IMM GRANULOCYTES NFR BLD: 1 %
LYMPHOCYTES # BLD AUTO: 0.7 10E3/UL (ref 0.8–5.3)
LYMPHOCYTES NFR BLD AUTO: 19 %
MAGNESIUM SERPL-MCNC: 1.8 MG/DL (ref 1.7–2.3)
MCH RBC QN AUTO: 34.8 PG (ref 26.5–33)
MCHC RBC AUTO-ENTMCNC: 33.6 G/DL (ref 31.5–36.5)
MCV RBC AUTO: 103 FL (ref 78–100)
MONOCYTES # BLD AUTO: 0.3 10E3/UL (ref 0–1.3)
MONOCYTES NFR BLD AUTO: 9 %
NEUTROPHILS # BLD AUTO: 2.4 10E3/UL (ref 1.6–8.3)
NEUTROPHILS NFR BLD AUTO: 69 %
NRBC # BLD AUTO: 0 10E3/UL
NRBC BLD AUTO-RTO: 0 /100
PLATELET # BLD AUTO: 109 10E3/UL (ref 150–450)
POTASSIUM SERPL-SCNC: 3.8 MMOL/L (ref 3.4–5.3)
RBC # BLD AUTO: 3.25 10E6/UL (ref 4.4–5.9)
SODIUM SERPL-SCNC: 139 MMOL/L (ref 136–145)
WBC # BLD AUTO: 3.5 10E3/UL (ref 4–11)

## 2022-09-28 PROCEDURE — 36415 COLL VENOUS BLD VENIPUNCTURE: CPT | Performed by: HOSPITALIST

## 2022-09-28 PROCEDURE — 250N000013 HC RX MED GY IP 250 OP 250 PS 637

## 2022-09-28 PROCEDURE — 97110 THERAPEUTIC EXERCISES: CPT | Mod: GP

## 2022-09-28 PROCEDURE — 87040 BLOOD CULTURE FOR BACTERIA: CPT | Performed by: HOSPITALIST

## 2022-09-28 PROCEDURE — 85025 COMPLETE CBC W/AUTO DIFF WBC: CPT | Performed by: HOSPITALIST

## 2022-09-28 PROCEDURE — 250N000013 HC RX MED GY IP 250 OP 250 PS 637: Performed by: HOSPITALIST

## 2022-09-28 PROCEDURE — 83735 ASSAY OF MAGNESIUM: CPT | Performed by: HOSPITALIST

## 2022-09-28 PROCEDURE — 80048 BASIC METABOLIC PNL TOTAL CA: CPT | Performed by: HOSPITALIST

## 2022-09-28 PROCEDURE — 250N000011 HC RX IP 250 OP 636: Performed by: INTERNAL MEDICINE

## 2022-09-28 PROCEDURE — 99232 SBSQ HOSP IP/OBS MODERATE 35: CPT | Performed by: HOSPITALIST

## 2022-09-28 PROCEDURE — 120N000001 HC R&B MED SURG/OB

## 2022-09-28 PROCEDURE — 97116 GAIT TRAINING THERAPY: CPT | Mod: GP

## 2022-09-28 PROCEDURE — G0378 HOSPITAL OBSERVATION PER HR: HCPCS

## 2022-09-28 RX ADMIN — ASPIRIN 81 MG CHEWABLE TABLET 81 MG: 81 TABLET CHEWABLE at 07:47

## 2022-09-28 RX ADMIN — FOLIC ACID 1 MG: 1 TABLET ORAL at 07:48

## 2022-09-28 RX ADMIN — ATENOLOL 25 MG: 25 TABLET ORAL at 09:16

## 2022-09-28 RX ADMIN — Medication 1000 MCG: at 09:21

## 2022-09-28 RX ADMIN — DULOXETINE HYDROCHLORIDE 60 MG: 60 CAPSULE, DELAYED RELEASE PELLETS ORAL at 20:59

## 2022-09-28 RX ADMIN — GABAPENTIN 300 MG: 300 CAPSULE ORAL at 20:59

## 2022-09-28 RX ADMIN — ATORVASTATIN CALCIUM 40 MG: 40 TABLET, FILM COATED ORAL at 07:48

## 2022-09-28 RX ADMIN — CEFTRIAXONE SODIUM 1 G: 1 INJECTION, POWDER, FOR SOLUTION INTRAMUSCULAR; INTRAVENOUS at 21:01

## 2022-09-28 RX ADMIN — Medication 1 TABLET: at 07:48

## 2022-09-28 RX ADMIN — Medication 50 MG: at 11:00

## 2022-09-28 RX ADMIN — TAMSULOSIN HYDROCHLORIDE 0.8 MG: 0.4 CAPSULE ORAL at 07:47

## 2022-09-28 RX ADMIN — LISINOPRIL 5 MG: 5 TABLET ORAL at 09:20

## 2022-09-28 RX ADMIN — GABAPENTIN 300 MG: 300 CAPSULE ORAL at 07:47

## 2022-09-28 RX ADMIN — MICONAZOLE NITRATE: 20 POWDER TOPICAL at 21:14

## 2022-09-28 RX ADMIN — Medication 4 CAPSULE: at 09:21

## 2022-09-28 RX ADMIN — GABAPENTIN 300 MG: 300 CAPSULE ORAL at 13:44

## 2022-09-28 RX ADMIN — GUAIFENESIN 600 MG: 600 TABLET, EXTENDED RELEASE ORAL at 20:59

## 2022-09-28 RX ADMIN — PANTOPRAZOLE SODIUM 40 MG: 20 TABLET, DELAYED RELEASE ORAL at 07:47

## 2022-09-28 RX ADMIN — DULOXETINE HYDROCHLORIDE 60 MG: 60 CAPSULE, DELAYED RELEASE PELLETS ORAL at 07:47

## 2022-09-28 RX ADMIN — GUAIFENESIN 600 MG: 600 TABLET, EXTENDED RELEASE ORAL at 07:47

## 2022-09-28 RX ADMIN — Medication 500 MG: at 09:21

## 2022-09-28 ASSESSMENT — ACTIVITIES OF DAILY LIVING (ADL)
ADLS_ACUITY_SCORE: 44
ADLS_ACUITY_SCORE: 42
ADLS_ACUITY_SCORE: 41
ADLS_ACUITY_SCORE: 42
ADLS_ACUITY_SCORE: 41
ADLS_ACUITY_SCORE: 43
ADLS_ACUITY_SCORE: 41

## 2022-09-28 NOTE — PROGRESS NOTES
PRIMARY DIAGNOSIS: GENERALIZED WEAKNESS    OUTPATIENT/OBSERVATION GOALS TO BE MET BEFORE DISCHARGE  1. Orthostatic performed: N/A    2. Tolerating PO medications: Yes    3. Return to near baseline physical activity: Yes    4. Cleared for discharge by consultants (if involved): No    Discharge Planner Nurse   Safe discharge environment identified: Yes  Barriers to discharge: Yes       Entered by: Krysta Farooq RN 09/28/2022 12:59 PM     Yesterday's blood cultures came back positive. Sent new cultures today. Will await results.   Pt is SBA to bathroom, working with therapy.   Up in chair for breakfast and lunch.  CIWA score 0.       Please review provider order for any additional goals.   Nurse to notify provider when observation goals have been met and patient is ready for discharge.

## 2022-09-28 NOTE — UTILIZATION REVIEW
Admission Status; Secondary Review Determination       Under the authority of the Utilization Management Committee, the utilization review process indicated a secondary review on the above patient. The review outcome is based on review of the medical records, discussions with staff, and applying clinical experience noted on the date of the review.     (x) Inpatient Status Appropriate - This patient's medical care is consistent with medical management for inpatient care and reasonable inpatient medical practice.     RATIONALE FOR DETERMINATION   At the time of admission with the information available to the attending physician complex care was anticipated, based on patient risk of adverse outcome if treated as outpatient and complex care required. Inpatient admission is appropriate based on the Medicare guidelines.     SUMMARY:  78 y/o male presented with weakness/fall.  Found to have a probable UTI.  In addition has now developed positive blood cultures suggestive of significant bacteremia contributing.  He is requiring ongoing IV antibiotics.  Given bacteremia and weakness he needs ongoing hospital care.     The information on this document is developed by the utilization review team in order for the business office to ensure compliance. This only denotes the appropriateness of proper admission status and does not reflect the quality of care rendered.   The definitions of Inpatient Status and Observation Status used in making the determination above are those provided in the CMS Coverage Manual, Chapter 1 and Chapter 6, section 70.4.     Sincerely,     Sukumar Nicholas DO, [facility]  Utilization Review  Physician Advisor

## 2022-09-28 NOTE — PROGRESS NOTES
Pt to transfer to  room 424. Report called and given to Celine RODRIGUEZ RN. Pt's belongings: Cell Phone, , Cane sent with pt.     Miconazole power will be sent to P4 from pharmacy.

## 2022-09-28 NOTE — PLAN OF CARE
Problem: Plan of Care - These are the overarching goals to be used throughout the patient stay.    Goal: Plan of Care Review/Shift Note  Description: The Plan of Care Review/Shift note should be completed every shift.  The Outcome Evaluation is a brief statement about your assessment that the patient is improving, declining, or no change.  This information will be displayed automatically on your shift note.  Outcome: Ongoing, Progressing     Problem: Plan of Care - These are the overarching goals to be used throughout the patient stay.    Goal: Readiness for Transition of Care  Outcome: Ongoing, Progressing   Goal Outcome Evaluation:    VSS. Pt denies pain. CIWA score of 0. Using urinal in bed

## 2022-09-28 NOTE — PROGRESS NOTES
"Patient arrived to room 424 from the ED at this time. Alert and oriented and able to communicate his needs. CIWA \"0\"  Celine London RN  "

## 2022-09-28 NOTE — PROGRESS NOTES
Hospitalist Progress Note        CODE STATUS:  Full Code    Assessment/Plan:  Reggie Gomes is a 77 year old male with history of CAD, HTN, HLD, prostrate cancer, bladder cancer, cervical spine stenosis, L4-L5 Laminectomy/fusion, chronic LBP, right sided foot drop, urinary incontinence and GERD who presented for evaluation of generalized weakness and episode of fall.    Generalized weakness, fall  - Assisted fall with a grocery cart, no head trauma or loss of consciousness  - Right lower extremity weakness on exam, history of foot drop after cervical spine surgery  - MRI lumbar spine showed similar changes from 2017, however L2-L3 moderate narrowing of the central canal mildly progressed, mild to moderate right foraminal narrowing and moderate left foraminal narrowing progressed from prior MRI.  L5-S1 prominent left-sided facet arthropathy with a new left-sided facet effusion.  - PT/OT evaluation and treatment  - Fall precuations    E. Coli Bacteremia   - Positive blood culture 1/2 bottles. Likely urinary source.  - Repeat blood cultures  - Continue ceftriaxone. Follow sensitivities.    Acute cystitis without hematuria  - Complains of dysuria and has had urine incontinence  - Continue IV ceftriaxone.  - Blood culture as above. Urine culture 10,000-50,000 CFU/mL Mixture of urogenital elva.     Leukopenia  - Possibly related to viral URI. Monitor.    Acute bronchitis  - Productive cough over the last 10 days  - Clear lungs on CT  - Supportive management, Mucinex     Hypomagnesemia  - Replace with IV     Daily alcohol use  - Patient reports drinking 5 glasses of wine daily  - Monitor with CIWA  - Continue multivitamin and folic acid     Chronic macrocytic anemia  - Continue PTA vitamin B12     Thrombocytopenia, chronic  - Platelet count slightly dropped from previous  - Outpatient follow-up     Coronary artery disease  - Denies angina symptoms  - Continue PTA aspirin, Tylenol     Essential hypertension  - Continue  PTA atenolol, lisinopril     Peripheral neuropathy, chronic low back pain  - Continue PTA gabapentin, acetaminophen, duloxetine, tizanidine     GERD  - Continue PTA omeprazole     Morbid obesity  - BMI 42.27. Lifestyle modifications advised.      DVT Prophylaxis:    Disposition/Barrier to discharge; Discharge to home with home care in 1-2 days pending culture sensitivities.     Subjective:  Interval History:   Patient seen and examined.   He's feeling well.     Review of Systems:   As mentioned in subjective.    Patient Active Problem List   Diagnosis     Dyslipidemia     Coronary Artery Disease     Prostate cancer (H)     Hydrocele, right     Ventral incisional hernia     Hip pain     Osteoarthritis     OA (osteoarthritis) of hip     Cellulitis of left upper extremity     Abnormality of gait due to impairment of balance     Acromioclavicular joint arthritis     Cervical stenosis of spine     Acute bilateral low back pain without sciatica     Dyslipidemia     H/O heart artery stent     History of bladder cancer     History of prostate cancer     Right foot drop     Generalized muscle weakness     Lumbar radiculopathy     Complete tear of rotator cuff     Cough     Acute cystitis without hematuria     Acute bronchitis due to other specified organisms       Scheduled Meds:    aspirin  81 mg Oral Daily     atenolol  25 mg Oral Daily     atorvastatin  40 mg Oral QAM     calcium carbonate 500 mg (elemental)  500 mg Oral Daily     cefTRIAXone  1 g Intravenous Q24H     cyanocobalamin  1,000 mcg Oral Daily     DULoxetine  60 mg Oral BID     folic acid  1 mg Oral Daily     gabapentin  300 mg Oral TID     guaiFENesin  600 mg Oral BID     lisinopril  5 mg Oral Daily     multivitamin w/minerals  1 tablet Oral Daily     pantoprazole  40 mg Oral Daily     psyllium  4 capsule Oral Daily     tamsulosin  0.8 mg Oral Daily     thiamine  50 mg Oral Daily     Continuous Infusions:  PRN Meds:.acetaminophen **OR** acetaminophen,  acetaminophen, flumazenil, OLANZapine zydis **OR** haloperidol lactate, LORazepam **OR** LORazepam, melatonin, ondansetron **OR** ondansetron, tiZANidine    Objective:  Vital signs in last 24 hours:  Temp:  [97.8  F (36.6  C)-100.8  F (38.2  C)] 99  F (37.2  C)  Pulse:  [62-84] 62  Resp:  [16-40] 20  BP: ()/(53-86) 104/65  SpO2:  [95 %-98 %] 96 %        Intake/Output Summary (Last 24 hours) at 9/27/2022 1001  Last data filed at 9/27/2022 0158  Gross per 24 hour   Intake 1600 ml   Output 75 ml   Net 1525 ml       Physical Exam:  General: In NAD  HEENT: NCAT & EOMI  CV: Normal S1S2, regular rhythm, normal rate  Lungs: CTAB  Abdomen: Soft, NT, ND, +BS  Extremities: No LE edema b/l  Neuro: AAOx3    Lab Results:  Serum Glucose range:   Recent Labs   Lab 09/26/22  1731   GLC 99     ABG: No lab results found in last 7 days.  CBC:   Recent Labs   Lab 09/26/22  1731   WBC 5.0   HGB 12.9*   HCT 39.5*   *   *   NEUTROPHIL 84   LYMPH 8   MONOCYTE 7   EOSINOPHIL 1     Chemistry:   Recent Labs   Lab 09/27/22  0806 09/26/22  1731   NA  --  138   POTASSIUM  --  4.3   CHLORIDE  --  100   CO2  --  27   BUN  --  12.6   CR  --  0.95   GFRESTIMATED  --  83   NEDA  --  8.9   MAG 1.6* 1.5*   PROTTOTAL  --  6.9   ALBUMIN  --  4.0   AST  --  19   ALT  --  21   ALKPHOS  --  51   BILITOTAL  --  1.9*     Coags:  Recent Labs   Lab 09/26/22  1731   INR 1.10     Cardiac Markers:  No results for input(s): CKTOTAL, TROPONINI in the last 168 hours.               09/28/2022   Charlene Garcia MD  Hospitalist  Pager: [phone]

## 2022-09-28 NOTE — PLAN OF CARE
"PRIMARY DIAGNOSIS: \"GENERIC\" NURSING  OUTPATIENT/OBSERVATION GOALS TO BE MET BEFORE DISCHARGE:  ADLs back to baseline: No    Activity and level of assistance: up with one assist    Pain status: Improved-controlled with oral pain medications.    Return to near baseline physical activity: No     Discharge Planner Nurse   Safe discharge environment identified: Yes  Barriers to discharge: Yes       Entered by: Yanique Steward RN 09/27/2022 10:56 PM     Please review provider order for any additional goals.   Nurse to notify provider when observation goals have been met and patient is ready for discharge.Goal Outcome Evaluation:      Pt. A/o x4. Temp 99.5. incont of urine. Requests external cath for hs. Pt reports h/a. Tylenol given w/ ice pack. Ate all of supper. Md paged for blood culture results and urine cx results. Iv abx infusing as ordered. On room air. Reminded pt to use call light for needs. Continue to monitor pt.                 "

## 2022-09-28 NOTE — PLAN OF CARE
"PRIMARY DIAGNOSIS: \"GENERIC\" NURSING  OUTPATIENT/OBSERVATION GOALS TO BE MET BEFORE DISCHARGE:  ADLs back to baseline: No    Activity and level of assistance: up w/ 1 assist    Pain status: Improved-controlled with oral pain medications.    Return to near baseline physical activity: No     Discharge Planner Nurse   Safe discharge environment identified: Yes  Barriers to discharge: Yes       Entered by: Yanique Steward RN 09/27/2022 10:50 PM     Please review provider order for any additional goals.   Nurse to notify provider when observation goals have been met and patient is ready for discharge.Goal Outcome Evaluation:      Pt a/o x4, pleasant, cooperative. Up in chair, eating early supper. Talking on phone. Denies pain. Low grade temp noted. Reminded pt to use call light for needs. Continue to monitor pt.                 "

## 2022-09-29 ENCOUNTER — APPOINTMENT (OUTPATIENT)
Dept: PHYSICAL THERAPY | Facility: HOSPITAL | Age: 77
DRG: 690 | End: 2022-09-29
Payer: MEDICARE

## 2022-09-29 VITALS
WEIGHT: 254 LBS | RESPIRATION RATE: 18 BRPM | HEART RATE: 72 BPM | DIASTOLIC BLOOD PRESSURE: 76 MMHG | HEIGHT: 65 IN | OXYGEN SATURATION: 95 % | BODY MASS INDEX: 42.32 KG/M2 | TEMPERATURE: 98.4 F | SYSTOLIC BLOOD PRESSURE: 128 MMHG

## 2022-09-29 LAB
BACTERIA BLD CULT: ABNORMAL
BACTERIA BLD CULT: ABNORMAL

## 2022-09-29 PROCEDURE — 90662 IIV NO PRSV INCREASED AG IM: CPT | Performed by: HOSPITALIST

## 2022-09-29 PROCEDURE — 250N000011 HC RX IP 250 OP 636: Performed by: HOSPITALIST

## 2022-09-29 PROCEDURE — G0008 ADMIN INFLUENZA VIRUS VAC: HCPCS | Performed by: HOSPITALIST

## 2022-09-29 PROCEDURE — 97116 GAIT TRAINING THERAPY: CPT | Mod: GP

## 2022-09-29 PROCEDURE — 99239 HOSP IP/OBS DSCHRG MGMT >30: CPT | Performed by: HOSPITALIST

## 2022-09-29 PROCEDURE — 97110 THERAPEUTIC EXERCISES: CPT | Mod: GP

## 2022-09-29 PROCEDURE — 250N000013 HC RX MED GY IP 250 OP 250 PS 637: Performed by: HOSPITALIST

## 2022-09-29 RX ORDER — LEVOFLOXACIN 750 MG/1
750 TABLET, FILM COATED ORAL DAILY
Status: DISCONTINUED | OUTPATIENT
Start: 2022-09-29 | End: 2022-09-29 | Stop reason: HOSPADM

## 2022-09-29 RX ORDER — TAMSULOSIN HYDROCHLORIDE 0.4 MG/1
0.8 CAPSULE ORAL DAILY
COMMUNITY
Start: 2022-09-29

## 2022-09-29 RX ORDER — LEVOFLOXACIN 750 MG/1
750 TABLET, FILM COATED ORAL DAILY
Qty: 6 TABLET | Refills: 0 | Status: SHIPPED | OUTPATIENT
Start: 2022-09-30 | End: 2022-10-06

## 2022-09-29 RX ADMIN — Medication 50 MG: at 08:13

## 2022-09-29 RX ADMIN — LISINOPRIL 5 MG: 5 TABLET ORAL at 08:12

## 2022-09-29 RX ADMIN — TAMSULOSIN HYDROCHLORIDE 0.8 MG: 0.4 CAPSULE ORAL at 08:12

## 2022-09-29 RX ADMIN — ASPIRIN 81 MG CHEWABLE TABLET 81 MG: 81 TABLET CHEWABLE at 08:11

## 2022-09-29 RX ADMIN — GABAPENTIN 300 MG: 300 CAPSULE ORAL at 08:11

## 2022-09-29 RX ADMIN — LEVOFLOXACIN 750 MG: 750 TABLET, FILM COATED ORAL at 11:13

## 2022-09-29 RX ADMIN — Medication 4 CAPSULE: at 08:13

## 2022-09-29 RX ADMIN — GUAIFENESIN 600 MG: 600 TABLET, EXTENDED RELEASE ORAL at 08:13

## 2022-09-29 RX ADMIN — ATENOLOL 25 MG: 25 TABLET ORAL at 08:12

## 2022-09-29 RX ADMIN — INFLUENZA A VIRUS A/VICTORIA/2570/2019 IVR-215 (H1N1) ANTIGEN (FORMALDEHYDE INACTIVATED), INFLUENZA A VIRUS A/DARWIN/9/2021 SAN-010 (H3N2) ANTIGEN (FORMALDEHYDE INACTIVATED), INFLUENZA B VIRUS B/PHUKET/3073/2013 ANTIGEN (FORMALDEHYDE INACTIVATED), AND INFLUENZA B VIRUS B/MICHIGAN/01/2021 ANTIGEN (FORMALDEHYDE INACTIVATED) 0.7 ML: 60; 60; 60; 60 INJECTION, SUSPENSION INTRAMUSCULAR at 10:09

## 2022-09-29 RX ADMIN — ATORVASTATIN CALCIUM 40 MG: 40 TABLET, FILM COATED ORAL at 08:11

## 2022-09-29 RX ADMIN — Medication 1000 MCG: at 08:11

## 2022-09-29 RX ADMIN — Medication 1 TABLET: at 08:12

## 2022-09-29 RX ADMIN — PANTOPRAZOLE SODIUM 40 MG: 20 TABLET, DELAYED RELEASE ORAL at 08:12

## 2022-09-29 RX ADMIN — DULOXETINE HYDROCHLORIDE 60 MG: 60 CAPSULE, DELAYED RELEASE PELLETS ORAL at 08:12

## 2022-09-29 RX ADMIN — MICONAZOLE NITRATE 1 G: 20 POWDER TOPICAL at 08:13

## 2022-09-29 RX ADMIN — FOLIC ACID 1 MG: 1 TABLET ORAL at 08:12

## 2022-09-29 RX ADMIN — Medication 500 MG: at 08:12

## 2022-09-29 ASSESSMENT — ACTIVITIES OF DAILY LIVING (ADL)
ADLS_ACUITY_SCORE: 43

## 2022-09-29 NOTE — PROGRESS NOTES
Patient discharged to home today transported by his daughter. Discharge paperwork reviewed with patient and all questions have been answered. IV discontinued. Prescriptions ready at his pharmacy. Patient alert and stable.  Celine London RN

## 2022-09-29 NOTE — PLAN OF CARE
Physical Therapy Discharge Summary    Reason for therapy discharge:    Discharged to home with home therapy.    Progress towards therapy goal(s). See goals on Care Plan in [facility] electronic health record for goal details.  Goals partially met.  Barriers to achieving goals:   discharge from facility.    Therapy recommendation(s):    Recommend continued therapies to improve overall strength, balance and mobility.       Jany Miles, PT, DPT  9/29/2022

## 2022-09-29 NOTE — PLAN OF CARE
Goal Outcome Evaluation:  Patient denies pain or discomfort.  On antibiotic, being treated for UTI/URI.  CIWA score is 0.  Told him to call for assistance over night as he did have a incident in the grocery store where his legs were week and needed to sit on the ground.  He has his cane at bedside, bed alarm is on.

## 2022-09-29 NOTE — PROGRESS NOTES
Patient accepted by [facility] for home PT. Pt called, and orders faxed.    CIRO Lay on 9/29/2022 at 2:28 PM

## 2022-09-29 NOTE — DISCHARGE SUMMARY
[facility] MEDICINE  DISCHARGE SUMMARY     Primary Care Physician: Marlon Mae  Admission Date: 9/26/2022   Discharge Provider: Charlene Garcia MD Discharge Date: 9/29/2022   Diet: Low saturated fat   Code Status: Full Code   Activity: DCACTIVITY: Activity as tolerated        Condition at Discharge: Stable     REASON FOR PRESENTATION(See Admission Note for Details)   Fall  PRINCIPAL & ACTIVE DISCHARGE DIAGNOSES     Principal Problem:    Generalized muscle weakness  Active Problems:    Right foot drop    Lumbar radiculopathy    Acute cystitis without hematuria    Acute bronchitis due to other specified organisms    Ferrer catheter in place    Fever, unspecified fever cause    Spinal stenosis of lumbar region, unspecified whether neurogenic claudication present  E. Coli Bacteremia     PENDING LABS     Unresulted Labs Ordered in the Past 30 Days of this Admission     Date and Time Order Name Status Description    9/28/2022  7:05 AM Blood Culture Hand, Left Preliminary     9/28/2022  7:05 AM Blood Culture Hand, Right Preliminary     9/26/2022 10:02 PM Blood Culture Peripheral Blood Preliminary             PROCEDURES ( this hospitalization only)          RECOMMENDATIONS TO OUTPATIENT PROVIDER FOR F/U VISIT     Follow-up Appointments     Follow-up and recommended labs and tests       Follow up with primary care provider, Marlon Mae, within 7 days to   evaluate treatment change, for hospital follow- up, and regarding new   diagnosis.                 DISPOSITION     Home with home care    SUMMARY OF HOSPITAL COURSE:    Reggie Gomes is a 77 year old male with history of CAD, HTN, HLD, prostrate cancer, bladder cancer, cervical spine stenosis, L4-L5 Laminectomy/fusion, chronic LBP, right sided foot drop, urinary incontinence and GERD who presented for evaluation of generalized weakness and episode of fall.     E. Coli Bacteremia   - Positive blood culture 1/2 bottles. Likely  urinary source.  - Repeat blood cultures so far with no growth to date  - Complete course w/ Levaquin per culture sensitivity report      Acute cystitis without hematuria  - Complains of dysuria and has had urine incontinence  - Blood culture as above. Urine culture 10,000-50,000 CFU/mL Mixture of urogenital elva.    Generalized weakness, fall  - Assisted fall with a grocery cart, no head trauma or loss of consciousness  - Right lower extremity weakness on exam, history of foot drop after cervical spine surgery  - MRI lumbar spine showed similar changes from 2017, however L2-L3 moderate narrowing of the central canal mildly progressed, mild to moderate right foraminal narrowing and moderate left foraminal narrowing progressed from prior MRI.  L5-S1 prominent left-sided facet arthropathy with a new left-sided facet effusion.  - Follow up with primary care provider & primary surgeon.  - PT/OT eval: Home PT   - Fall precuations     Leukopenia  - Possibly related to viral URI.      Acute bronchitis  - Productive cough over the last 10 days  - Clear lungs on CT     Hypomagnesemia  - Replace with IV     Daily alcohol use  - No evidence of withdrawal     Chronic macrocytic anemia  - Continue PTA vitamin B12     Thrombocytopenia, chronic  - Platelet count slightly dropped from previous  - Outpatient follow-up     Coronary artery disease  - Stable  - Continue PTA aspirin, Tylenol     Essential hypertension  - Continue PTA atenolol, lisinopril     Peripheral neuropathy, chronic low back pain  - Continue PTA gabapentin, acetaminophen, duloxetine, tizanidine     GERD  - Continue PTA omeprazole     Morbid obesity  - BMI 42.27. Lifestyle modifications advised.         Discharge Medications with Med changes:     Current Discharge Medication List      START taking these medications    Details   levofloxacin (LEVAQUIN) 750 MG tablet Take 1 tablet (750 mg) by mouth daily for 6 days  Qty: 6 tablet, Refills: 0    Associated Diagnoses:  E coli bacteremia      miconazole (MICATIN) 2 % external powder Apply topically 2 times daily Apply to Groin  Qty: 43 g, Refills: 0    Associated Diagnoses: Alteration in skin integrity due to moisture         CONTINUE these medications which have CHANGED    Details   tamsulosin (FLOMAX) 0.4 MG capsule Take 2 capsules (0.8 mg) by mouth daily    Associated Diagnoses: Prostate cancer (H)         CONTINUE these medications which have NOT CHANGED    Details   acetaminophen (TYLENOL) 500 MG tablet Take 2 tablets (1,000 mg) by mouth every 8 hours as needed for mild pain    Associated Diagnoses: Nontraumatic complete tear of left rotator cuff      aspirin (ASA) 81 MG chewable tablet Take 81 mg by mouth daily      atenolol (TENORMIN) 25 MG tablet Take 25 mg by mouth daily      atorvastatin (LIPITOR) 40 MG tablet [ATORVASTATIN (LIPITOR) 40 MG TABLET] Take 40 mg by mouth every morning.      CALCIUM CARBONATE (CALCIUM 500 ORAL) [CALCIUM CARBONATE (CALCIUM 500 ORAL)] Take 1 tablet by mouth daily.      DULoxetine (CYMBALTA) 60 MG capsule Take 60 mg by mouth 2 times daily      gabapentin (NEURONTIN) 300 MG capsule Take 300 mg by mouth 3 times daily      lisinopril (PRINIVIL,ZESTRIL) 5 MG tablet [LISINOPRIL (PRINIVIL,ZESTRIL) 5 MG TABLET] Take 1 tablet (5 mg total) by mouth daily.  Qty: 90 tablet, Refills: 0    Comments: Due to see Dr. Caldwell  Associated Diagnoses: CAD (coronary artery disease)      omeprazole (PRILOSEC) 20 MG capsule [OMEPRAZOLE (PRILOSEC) 20 MG CAPSULE] Take 20 mg by mouth daily.      psyllium (METAMUCIL) 0.52 gram capsule Take 2.08 g by mouth daily 4 caps qday      thiamine 50 MG TABS Take 50 mg by mouth daily Not sure of dose at this time.      tiZANidine (ZANAFLEX) 2 MG tablet Take 2 mg by mouth 3 times daily as needed for muscle spasms      vitamin B-12 (CYANOCOBALAMIN) 1000 MCG tablet Take 1,000 mcg by mouth daily           Consults   - None    Immunizations given this encounter     Most Recent  Immunizations   Administered Date(s) Administered     COVID-19,PF,Pfizer (12+ Yrs) 03/25/2021     COVID-19,PF,Pfizer 12+ Yrs (2022 and After) 03/25/2021     Influenza (High Dose) 3 valent vaccine 09/06/2021     Influenza, Quad, High Dose, Pf, 65yr+ (Fluzone HD) 09/29/2022           Anticoagulation Information      Recent INR results:   Recent Labs   Lab 09/26/22  1731   INR 1.10         SIGNIFICANT IMAGING FINDINGS     Results for orders placed or performed during the hospital encounter of 09/26/22   CTA Chest Abdomen Pelvis w Contrast    Impression    IMPRESSION:  1.  No evidence of aortic aneurysm or dissection.  2.  Mildly enlarged heart with coronary artery disease and atherosclerotic vascular disease.  3.  Status post partial sigmoidectomy with anastomosis. Scattered diverticulosis. No obstruction, colitis, or diverticulitis.  4.  Cholelithiasis.     Lumbar spine MRI w/o contrast    Impression    IMPRESSION:  1.  Motion degraded exam.  2.  Again seen are previous posterior instrumented L4-L5 fusion changes and laminectomy changes. Laminectomy changes may also involve the L3-L4 level.  3.  Vertebral body heights and lateral lumbar alignment similar to prior MRI 11/15/2017 with low-grade retrolisthesis L1 on L2 and L2 on L3.  4.  At the L2-L3 level there is a shallow broad-based disc and osteophyte complex with moderate facet arthropathy and hypertrophy. Moderate narrowing of the central canal, mildly progressed from previous MRI. Some narrowing of both lateral recesses. Mild   to moderate right foraminal narrowing and moderate left foraminal narrowing both progressed from prior MRI.  5.  At that L3-L4 level there is a shallow broad-based disc and osteophyte complex. There may have been prior laminectomies. Prominent facet arthropathy at this level. Overall central canal is moderately narrowed similar to previous MRI. Prominent   narrowing of both L3-L4 foramen right greater than left similar to prior MRI.  6.   At the L4-L5 level where there are fusion and laminectomy changes and the central canal remains adequate and both neural foramen at this level remain adequate.  7.  At the L5-S1 level there is prominent left-sided facet arthropathy with a new left-sided facet effusion. There is a shallow disc and osteophyte complex. Central canal adequate with prominent left lateral recess narrowing increased from prior MRI.   Mild right foraminal narrowing and moderate left foraminal narrowing.       SIGNIFICANT LABORATORY FINDINGS     Most Recent 3 CBC's:Recent Labs   Lab Test 09/28/22  0721 09/26/22  1731 10/14/21  0529 10/14/21  0108   WBC 3.5* 5.0  --  6.6   HGB 11.3* 12.9*  --  11.0*   * 105*  --  105*   * 116* 125* 138*     Most Recent 3 BMP's:Recent Labs   Lab Test 09/28/22  0721 09/26/22  1731 10/14/21  0529 10/12/21  0528    138  --  141   POTASSIUM 3.8 4.3  --  4.0   CHLORIDE 105 100  --  107   CO2 25 27  --  26   BUN 12.9 12.6  --  10   CR 0.85 0.95 0.79 0.84   ANIONGAP 9 11  --  8   NEDA 8.1* 8.9  --  8.6   * 99  --  105         Discharge Orders        Home Care Referral      Reason for your hospital stay    Fall   Blood stream infection     Follow-up and recommended labs and tests     Follow up with primary care provider, Marlon Mae, within 7 days to evaluate treatment change, for hospital follow- up, and regarding new diagnosis.     Activity    Your activity upon discharge: activity as tolerated     Diet    Follow this diet upon discharge: Orders Placed This Encounter      Combination Diet Low Saturated Fat Na <2400mg Diet       Examination   General: In NAD  HEENT: NCAT & EOMI  CV: Normal S1S2, regular rhythm, normal rate  Lungs: CTAB  Abdomen: Soft, NT, ND, +BS  Extremities: No LE edema b/l  Neuro: AAOx3    Please see EMR for more detailed significant labs, imaging, consultant notes etc.    Charlene STONE MD, personally saw the patient today and spent greater than 30 minutes  discharging this patient.    Charlene Garcia MD  [facility]    CC:Marlon Mae

## 2022-09-29 NOTE — PLAN OF CARE
Problem: Fall Injury Risk  Goal: Absence of Fall and Fall-Related Injury  Outcome: Ongoing, Progressing  Intervention: Identify and Manage Contributors  Recent Flowsheet Documentation  Taken 9/29/2022 0000 by Ronna Anne RN  Medication Review/Management: medications reviewed  Intervention: Promote Injury-Free Environment  Recent Flowsheet Documentation  Taken 9/29/2022 0000 by Ronna Anne, RN  Safety Promotion/Fall Prevention:    activity supervised    bed alarm on   Goal Outcome Evaluation:        Pt denies pain and nausea. Pt reports chronic numbness and tingling in hands and feet from neuropathy. Moderate strength with dorsi/plantar flexion/extension. Right slightly weaker than left. Pt up to brm with cane and one assist. Somewhat unsteady. Pt reminded to call for assistance when getting up. Bed alarm on.

## 2022-09-29 NOTE — PLAN OF CARE
Problem: Plan of Care - These are the overarching goals to be used throughout the patient stay.    Goal: Plan of Care Review/Shift Note  Outcome: Ongoing, Progressing   Goal Outcome Evaluation:    Strong nonproductive cough. Lung sounds clear and diminished. Denies any complaints of shortness of breath. Voiding without difficulty. Transitioned to oral antibiotics today. Up in chair with SBA of (1). Discharge home today with home care    Celine London RN

## 2022-10-01 ENCOUNTER — HEALTH MAINTENANCE LETTER (OUTPATIENT)
Age: 77
End: 2022-10-01

## 2022-10-01 ENCOUNTER — PATIENT OUTREACH (OUTPATIENT)
Dept: CARE COORDINATION | Facility: CLINIC | Age: 77
End: 2022-10-01

## 2022-10-01 NOTE — PROGRESS NOTES
[facility] Contact  [facility]/Voicemail     Clinical Data: Transitional Care Management Outreach     Outreach attempted x 2.  Left message on patient's voicemail, providing [facility]'s 24/7 scheduling and nurse triage phone number [phone] ([phone]) for questions/concerns and/or to schedule an appt with an [facility] provider, if they do not have a PCP.      Plan:  General acute hospital will do no further outreaches at this time.       Martha Croft  Community Health Worker  General acute hospital, [facility]  [phone]      *Connected Care Resource Team does NOT follow patient ongoing. Referrals are identified based on internal discharge reports and the outreach is to ensure patient has an understanding of their discharge instructions.

## 2022-10-02 LAB — BACTERIA BLD CULT: NO GROWTH

## 2022-10-03 LAB
BACTERIA BLD CULT: NO GROWTH
BACTERIA BLD CULT: NO GROWTH

## 2023-01-12 NOTE — PROGRESS NOTES
Care Management Discharge Note    Discharge Date: 10/14/2021       Discharge Disposition: Home    Discharge Services: None    Discharge DME:  No new DME    Discharge Transportation: family or friend will provide      Education Provided on the Discharge Plan:  Yes  Persons Notified of Discharge Plans: yes    Patient/Family in Agreement with the Plan: yes    Handoff Referral Completed: yes    Additional Information:  Confirmed with patient discharge plan is to return home with no needs. Family to transport.         Rosamaria Malone RN         Patient was observed attending group session. Patient is anxious and continuously walking throughout unit. Patient complaining of back and knee  pain 8-9/10 Ibuprofen 800 mg given. Patient  is concerned about getting snacks since her blood sugar level at 142 is too low. Patient was smiling when talking about discharge home and voiced her excitement.Patient was able to sleep through the night with minimal disturbance. Denies SI/HI and AVH. Safety measures remain upheld.

## 2023-10-15 ENCOUNTER — HEALTH MAINTENANCE LETTER (OUTPATIENT)
Age: 78
End: 2023-10-15

## 2024-07-15 ENCOUNTER — OFFICE VISIT (OUTPATIENT)
Dept: DERMATOLOGY | Facility: CLINIC | Age: 79
End: 2024-07-15
Payer: MEDICARE

## 2024-07-15 DIAGNOSIS — Z12.83 SKIN CANCER SCREENING: Primary | ICD-10-CM

## 2024-07-15 DIAGNOSIS — Z85.828 HISTORY OF SKIN CANCER: ICD-10-CM

## 2024-07-15 DIAGNOSIS — L57.0 ACTINIC KERATOSIS: ICD-10-CM

## 2024-07-15 DIAGNOSIS — L82.0 INFLAMED SEBORRHEIC KERATOSIS: ICD-10-CM

## 2024-07-15 PROCEDURE — 99202 OFFICE O/P NEW SF 15 MIN: CPT | Mod: 25 | Performed by: DERMATOLOGY

## 2024-07-15 PROCEDURE — 17000 DESTRUCT PREMALG LESION: CPT | Mod: GC | Performed by: DERMATOLOGY

## 2024-07-15 ASSESSMENT — PAIN SCALES - GENERAL: PAINLEVEL: NO PAIN (0)

## 2024-07-15 NOTE — NURSING NOTE
"Dermatology Rooming Note    Reggie Gomes's goals for this visit include:   Chief Complaint   Patient presents with    Derm Problem     Spots of concern on scalp- they \"come and go\"      ESTEPHANIE Stanford    "

## 2024-07-15 NOTE — LETTER
7/15/2024       RE: Reggie Gomes  [address]  [facility] [address]     Dear Colleague,    Thank you for referring your patient, Reggie Gomes, to the [facility] [place] at [facility]. Please see a copy of my visit note below.    [facility] Dermatology Note  Encounter Date: Jul 15, 2024  Office Visit     Dermatology Problem List:  #Right bicep-pigmented BCC s/p D&C 02/21/2020.  #Right frontal scalp-SCC in situ, biopsy 12/05/2019 s/p Mohs.   #Right superior scalp-BCC, biopsy 12/05/2019 s/p Mohs.   #AK to right post auricular region s/p cryotherapy   ____________________________________________    Assessment & Plan:     # Benign skin exam including benign nevi, SK, cherry angioma, solar lentigines, and open comedones   - Reviewed diagnoses and benign nature of findings   - Sun protection: Counseled SPF30+ sunscreen, hand out provided   - Patient to call for any changes or new symptoms from moles     # History of skin cancer- NERD    # AK, right post auricular region   - Reviewed diagnosis and precancerous nature   - Cryotherapy completed today, see procedure note below     #ISK to the right temporal scalp  - Discussed benign nature and provided reassurance   - Consider cryotheryapy in future if irritated     Procedures Performed:   - Cryotherapy procedure note, location(s): right post auricular region. After verbal consent and discussion of risks and benefits including, but not limited to, dyspigmentation/scar, blister, and pain, 1 lesion(s) was(were) treated with 1-2 mm freeze border for 1-2 cycles with liquid nitrogen. Post cryotherapy instructions were provided.    Follow-up: 6 month(s) in-person for FBSE, or earlier for new or changing lesions    Staff and Resident:  Patient seen and staffed with Dr. Segura.     Marce Camacho MD  Dermatology Resident PGY-2  I was present for the entire procedure.  "Lena Segura MD   I, Lena Segura MD, saw this patient with the resident and agree with the resident s findings and plan of care as documented in the resident s note.    ____________________________________________    CC: Derm Problem (Spots of concern on scalp- they \"come and go\" )    HPI:  Mr. Reggie Gomes is a(n) 79 year old male who presents today as a new patient for FBSE.     Patient reports some spots on his scalp that come and go. They are not bothersome, but he scratches at them and notices flaking when he does. Denies wearing sunscreen. Denies any changing moles or symptomatic moles. Does have personal history of SCC and BCC.     Patient is otherwise feeling well, without additional skin concerns.    Labs Reviewed:  N/A    Physical Exam:  Vitals: There were no vitals taken for this visit.  SKIN: Focused skin exam of the face, scalp, neck, and arms.    - There is an erythematous, hyperkeratotic papule on the right post auricular region.   - There are dome shaped bright red papules on the trunk and extremities.   - Scattered brown macules on sun exposed areas.  - There are waxy stuck on tan to brown papules on the trunk and extremities.  - Open comedones to forehead    - No other lesions of concern on areas examined.     Medications:  Current Outpatient Medications   Medication Sig Dispense Refill     acetaminophen (TYLENOL) 500 MG tablet Take 2 tablets (1,000 mg) by mouth every 8 hours as needed for mild pain       aspirin (ASA) 81 MG chewable tablet Take 81 mg by mouth daily       atenolol (TENORMIN) 25 MG tablet Take 25 mg by mouth daily       atorvastatin (LIPITOR) 40 MG tablet [ATORVASTATIN (LIPITOR) 40 MG TABLET] Take 40 mg by mouth every morning.       CALCIUM CARBONATE (CALCIUM 500 ORAL) [CALCIUM CARBONATE (CALCIUM 500 ORAL)] Take 1 tablet by mouth daily.       DULoxetine (CYMBALTA) 60 MG capsule Take 60 mg by mouth 2 times daily       gabapentin (NEURONTIN) 300 MG capsule Take 300 mg " by mouth 3 times daily       lisinopril (PRINIVIL,ZESTRIL) 5 MG tablet [LISINOPRIL (PRINIVIL,ZESTRIL) 5 MG TABLET] Take 1 tablet (5 mg total) by mouth daily. 90 tablet 0     miconazole (MICATIN) 2 % external powder Apply topically 2 times daily Apply to Groin 43 g 0     omeprazole (PRILOSEC) 20 MG capsule [OMEPRAZOLE (PRILOSEC) 20 MG CAPSULE] Take 20 mg by mouth daily.       psyllium (METAMUCIL) 0.52 gram capsule Take 2.08 g by mouth daily 4 caps qday       tamsulosin (FLOMAX) 0.4 MG capsule Take 2 capsules (0.8 mg) by mouth daily       thiamine 50 MG TABS Take 50 mg by mouth daily Not sure of dose at this time.       tiZANidine (ZANAFLEX) 2 MG tablet Take 2 mg by mouth 3 times daily as needed for muscle spasms       vitamin B-12 (CYANOCOBALAMIN) 1000 MCG tablet Take 1,000 mcg by mouth daily       No current facility-administered medications for this visit.      Past Medical History:   Patient Active Problem List   Diagnosis     Dyslipidemia     Coronary Artery Disease     Prostate cancer (H)     Hydrocele, right     Ventral incisional hernia     Hip pain     Osteoarthritis     OA (osteoarthritis) of hip     Cellulitis of left upper extremity     Abnormality of gait due to impairment of balance     Acromioclavicular joint arthritis     Cervical stenosis of spine     Acute bilateral low back pain without sciatica     Dyslipidemia     H/O heart artery stent     History of bladder cancer     History of prostate cancer     Right foot drop     Generalized muscle weakness     Lumbar radiculopathy     Complete tear of rotator cuff     Cough     Acute cystitis without hematuria     Acute bronchitis due to other specified organisms     Ferrer catheter in place     Fever, unspecified fever cause     Spinal stenosis of lumbar region, unspecified whether neurogenic claudication present     Past Medical History:   Diagnosis Date     Arthritis     osteo     BPH (benign prostatic hyperplasia)      Cancer (H)     prostate and  bladder     Coronary artery disease      GERD (gastroesophageal reflux disease)      Hydrocele      Hyperlipidemia      Hypertension      Lumbar spinal stenosis      Myocardial infarction (H) 2004     Prostate cancer (H)        CC Referred Self, MD  No address on file on close of this encounter.      Again, thank you for allowing me to participate in the care of your patient.      Sincerely,    Lena Segura MD

## 2024-07-15 NOTE — PATIENT INSTRUCTIONS
"Patient Education   Skin Cancer Prevention: Care Instructions  Your Care Instructions     Skin cancer is the abnormal growth of cells in the skin. It usually appears as a growth that changes in color, shape, or size. This can be a sore that does not heal or a change in a mole. Skin cancer is almost always curable when found early and treated. So it is important to see your doctor if you have any of these changes in your skin.  Skin cancer is the most common type of cancer. It often appears on areas of the body that have been exposed to the sun, such as the head, face, neck, back, chest, or shoulders.  Follow-up care is a key part of your treatment and safety. Be sure to make and go to all appointments, and call your doctor if you are having problems. It's also a good idea to know your test results and keep a list of the medicines you take.  How can you care for yourself at home?  Wear a wide-brimmed hat and long sleeves and pants if you are going to be outdoors for a long time.  Avoid the sun between 10 a.m. and 4 p.m., which is the peak time for UV rays.  Wear sunscreen on exposed skin. Make sure to use a broad-spectrum sunscreen that has a sun protection factor (SPF) of 30 or higher. Use it every day, even when it is cloudy.  Do not use tanning booths or sunlamps.  Use lip balm or cream that has sun protection factor (SPF) to protect your lips from getting sunburned.  Wear sunglasses that block UV rays.  When should you call for help?  Watch closely for changes in your health, and be sure to contact your doctor if:    You see a change in your skin, such as a growth or mole that:  Grows bigger. This may happen very slowly.  Changes color.  Changes shape.  Starts to bleed easily.     You have swollen glands in your armpits, groin, or neck.     You do not get better as expected.   Where can you learn more?  Go to https://www.healthwise.net/patiented  Enter P392 in the search box to learn more about \"Skin Cancer " "Prevention: Care Instructions.\"  Current as of: November 16, 2023               Content Version: 14.0    5885-7729 [employer].   Care instructions adapted under license by your healthcare professional. If you have questions about a medical condition or this instruction, always ask your healthcare professional. [employer] disclaims any warranty or liability for your use of this information.    Sunscreen     What does SPF mean?   SPF stands for  Sun Protection Factor  and represents the ability to screen only UVB (burning) rays. UVB rays are mostly blocked in all sunscreens, but only those that contain titanium dioxide, zinc oxide, mexoryl or Parsol 1789 (avobenzone) block the UVA spectrum. Zinc oxide is the best of all. Even though a sunscreen is labeled  UVA/UVB Protection  that is not entirely accurate because even partial protection allows this label!     What does \"broad spectrum mean\"?   The best sunscreens protect against all UVB (Burning) rays and UVA (Aging, cAncer, tAnning) rays.     What SPF should I chose?   Aim to get a sunscreen that is at least sun protection factor (SPF) 30, SPF 50 if possible. SPF 15 provides about 92-93% coverage, SPF 30 about 95-97% coverage, and SPF 45 about 98% coverage. That is to say, SPF 30 is not twice as good as SPF 15; think of it as a curve graph. If covering your whole body, you should be using 30grams, or one ounce, which is how much is in one shot glass! That s a THICK layer!     Good daily facial moisturizers with sunscreen:   [facility] Block Sheer   Anthelios by LaRochePosay   Aveeno Positively Radiant  Oil of Olay Complete Defense for sensitive skin     Sunscreen Recommendations for Sensitive Skin   The following sunscreens may be better for your child's sensitive skin. The main active ingredients are inert, either titanium dioxide or zinc oxide. These ingredients are less irritating than chemical sunscreens.   -Anthelios Mineral Zinc " Oxide Sunscreen SPF 50  -Aveeno Active Natural Protection Mineral Block Lotion SPF 30   -Aveeno Baby Natural Protection Face Stick SPF 50+   -Banana Boat Natural Reflect (baby or kids) SPF 50+   -Rohan's Bees Chemical-Free Sunscreen SPF 30   -Blue Lizard Baby SPF 30+   -Blue Lizard for Sensitive Skin SPF 30+   -Brush on Block Mineral Powder SPF 30  -Colorscience Brush on Shield SPF 50  -Cotz Pure SPF 30   -Cotz Face SPF 40   -Cotz 20% Zinc SPF 35   -CVS Sensitive Skin SPF 30   -CVS Baby Lotion Sunscreen SPF 60+  -EltaMD UV Elements or UV Physical Broad Spectrum  -Mustella Broad Spectrum SPF 50+/Mineral Sunscreen Stick   -Neutrogena Sensitive Skin SPF 30   -Neutrogena Sensitive Skin SPF 60+   -PreSun Sensitive Sunblock SPF 28   -Vanicream Sunscreen for Sensitive Skin SPF 60   -Walgreen's Sensitive Skin SPF 70     Many local pharmacies and [employer] carry some of these options or you may purchase them online at www.[employer] or wwwWeotta.     Darker Skin Types & Sunscreen  Patients with darker skin colors tend to like tinted sunscreens better as they appear less chalky on the skin. Many BB Creams are now available but make sure the sunscreen SPF is at least 30! Here are some brands of tinted sunscreens:  -Brush on Block Mineral Powder SPF 30  -Cerave Sunscreen SPF 50 Face Lotion Invisible Zinc  -Clarins UV Plus sunscreen Multiprotection Tint  -Clinque SPF 30 Mineral Sunscreen Fluid for Face  -Colorscience Brush on Shield SPF 50  -Coppertone ClearlySheer Lotion SPF 50  -Cover FX Clear Cover Invisible Sunscreen Broad Spectrum SPF 30  -Dr Jart + Every Sun Day sunscreen  -EltaMD UV Daily Tinted Broad Spectrum  -Junetics Pure Energy Brightening Day Cream with Broad Spectrum SPF 50  -La Roche Posay Anthelios Mineral Tinted Sunscreen SPF 50  -La Roche Posay Anthelios 60 Ultra Light Sunscreen Fluid  -Neutrogena Tinted  -Palmers Cocoa Butter Formula Evertone Suncare Sunscreen Stick SPF 30  -Per-fect Beauty  skin Perfection Plus with SPF 30  -Flaca Komal Age Perfect Hydra-Nutrition Facial Oil SPF 30  -Jabari Multicorrection 5 in 1 Chest, Neck and Face Cream with SPF 30  -Solbar Shield SPF 40  -Tropical Sands All Natural SPF 50  -Up & Up (Target) Sheer Dry-touch Lotion SPF 30

## 2024-07-15 NOTE — PROGRESS NOTES
"[facility] Dermatology Note  Encounter Date: Jul 15, 2024  Office Visit     Dermatology Problem List:  #Right bicep-pigmented BCC s/p D&C 02/21/2020.  #Right frontal scalp-SCC in situ, biopsy 12/05/2019 s/p Mohs.   #Right superior scalp-BCC, biopsy 12/05/2019 s/p Mohs.   #AK to right post auricular region s/p cryotherapy   ____________________________________________    Assessment & Plan:     # Benign skin exam including benign nevi, SK, cherry angioma, solar lentigines, and open comedones   - Reviewed diagnoses and benign nature of findings   - Sun protection: Counseled SPF30+ sunscreen, hand out provided   - Patient to call for any changes or new symptoms from moles     # History of skin cancer- NERD    # AK, right post auricular region   - Reviewed diagnosis and precancerous nature   - Cryotherapy completed today, see procedure note below     #ISK to the right temporal scalp  - Discussed benign nature and provided reassurance   - Consider cryotheryapy in future if irritated     Procedures Performed:   - Cryotherapy procedure note, location(s): right post auricular region. After verbal consent and discussion of risks and benefits including, but not limited to, dyspigmentation/scar, blister, and pain, 1 lesion(s) was(were) treated with 1-2 mm freeze border for 1-2 cycles with liquid nitrogen. Post cryotherapy instructions were provided.    Follow-up: 6 month(s) in-person for FBSE, or earlier for new or changing lesions    Staff and Resident:  Patient seen and staffed with Dr. Segura.     Marce Camacho MD  Dermatology Resident PGY-2  I was present for the entire procedure. Lena Segura MD   I, Lena Segura MD, saw this patient with the resident and agree with the resident s findings and plan of care as documented in the resident s note.    ____________________________________________    CC: Derm Problem (Spots of concern on scalp- they \"come and go\" )    HPI:  Mr. Reggie NARVAEZ " Eliseo is a(n) 79 year old male who presents today as a new patient for FBSE.     Patient reports some spots on his scalp that come and go. They are not bothersome, but he scratches at them and notices flaking when he does. Denies wearing sunscreen. Denies any changing moles or symptomatic moles. Does have personal history of SCC and BCC.     Patient is otherwise feeling well, without additional skin concerns.    Labs Reviewed:  N/A    Physical Exam:  Vitals: There were no vitals taken for this visit.  SKIN: Focused skin exam of the face, scalp, neck, and arms.    - There is an erythematous, hyperkeratotic papule on the right post auricular region.   - There are dome shaped bright red papules on the trunk and extremities.   - Scattered brown macules on sun exposed areas.  - There are waxy stuck on tan to brown papules on the trunk and extremities.  - Open comedones to forehead    - No other lesions of concern on areas examined.     Medications:  Current Outpatient Medications   Medication Sig Dispense Refill    acetaminophen (TYLENOL) 500 MG tablet Take 2 tablets (1,000 mg) by mouth every 8 hours as needed for mild pain      aspirin (ASA) 81 MG chewable tablet Take 81 mg by mouth daily      atenolol (TENORMIN) 25 MG tablet Take 25 mg by mouth daily      atorvastatin (LIPITOR) 40 MG tablet [ATORVASTATIN (LIPITOR) 40 MG TABLET] Take 40 mg by mouth every morning.      CALCIUM CARBONATE (CALCIUM 500 ORAL) [CALCIUM CARBONATE (CALCIUM 500 ORAL)] Take 1 tablet by mouth daily.      DULoxetine (CYMBALTA) 60 MG capsule Take 60 mg by mouth 2 times daily      gabapentin (NEURONTIN) 300 MG capsule Take 300 mg by mouth 3 times daily      lisinopril (PRINIVIL,ZESTRIL) 5 MG tablet [LISINOPRIL (PRINIVIL,ZESTRIL) 5 MG TABLET] Take 1 tablet (5 mg total) by mouth daily. 90 tablet 0    miconazole (MICATIN) 2 % external powder Apply topically 2 times daily Apply to Groin 43 g 0    omeprazole (PRILOSEC) 20 MG capsule [OMEPRAZOLE  (PRILOSEC) 20 MG CAPSULE] Take 20 mg by mouth daily.      psyllium (METAMUCIL) 0.52 gram capsule Take 2.08 g by mouth daily 4 caps qday      tamsulosin (FLOMAX) 0.4 MG capsule Take 2 capsules (0.8 mg) by mouth daily      thiamine 50 MG TABS Take 50 mg by mouth daily Not sure of dose at this time.      tiZANidine (ZANAFLEX) 2 MG tablet Take 2 mg by mouth 3 times daily as needed for muscle spasms      vitamin B-12 (CYANOCOBALAMIN) 1000 MCG tablet Take 1,000 mcg by mouth daily       No current facility-administered medications for this visit.      Past Medical History:   Patient Active Problem List   Diagnosis    Dyslipidemia    Coronary Artery Disease    Prostate cancer (H)    Hydrocele, right    Ventral incisional hernia    Hip pain    Osteoarthritis    OA (osteoarthritis) of hip    Cellulitis of left upper extremity    Abnormality of gait due to impairment of balance    Acromioclavicular joint arthritis    Cervical stenosis of spine    Acute bilateral low back pain without sciatica    Dyslipidemia    H/O heart artery stent    History of bladder cancer    History of prostate cancer    Right foot drop    Generalized muscle weakness    Lumbar radiculopathy    Complete tear of rotator cuff    Cough    Acute cystitis without hematuria    Acute bronchitis due to other specified organisms    Ferrer catheter in place    Fever, unspecified fever cause    Spinal stenosis of lumbar region, unspecified whether neurogenic claudication present     Past Medical History:   Diagnosis Date    Arthritis     osteo    BPH (benign prostatic hyperplasia)     Cancer (H)     prostate and bladder    Coronary artery disease     GERD (gastroesophageal reflux disease)     Hydrocele     Hyperlipidemia     Hypertension     Lumbar spinal stenosis     Myocardial infarction (H) 2004    Prostate cancer (H)        CC Referred Self, MD  No address on file on close of this encounter.

## 2024-12-07 ENCOUNTER — HEALTH MAINTENANCE LETTER (OUTPATIENT)
Age: 79
End: 2024-12-07

## 2025-02-11 ENCOUNTER — OFFICE VISIT (OUTPATIENT)
Dept: DERMATOLOGY | Facility: CLINIC | Age: 80
End: 2025-02-11
Attending: DERMATOLOGY
Payer: MEDICARE

## 2025-02-11 DIAGNOSIS — L82.1 SK (SEBORRHEIC KERATOSIS): Primary | ICD-10-CM

## 2025-02-11 DIAGNOSIS — D22.9 MULTIPLE NEVI: ICD-10-CM

## 2025-02-11 DIAGNOSIS — Z85.828 HISTORY OF SKIN CANCER: ICD-10-CM

## 2025-02-11 ASSESSMENT — PAIN SCALES - GENERAL: PAINLEVEL_OUTOF10: NO PAIN (0)

## 2025-02-11 NOTE — PROGRESS NOTES
[facility] Dermatology Note  Encounter Date: Feb 11, 2025  Office Visit     Dermatology Problem List:  #Right bicep-pigmented BCC s/p D&C 02/21/2020.  #Right frontal scalp-SCC in situ, biopsy 12/05/2019 s/p Mohs.   #Right superior scalp-BCC, biopsy 12/05/2019 s/p Mohs.   #AK to right post auricular region s/p cryotherapy    ____________________________________________    Assessment & Plan:     # Seborrheic keratoses- many on scalp but none with inflammation.  Discussed benign diagnosis    # History of skin cancer- NERD    # Nevi- none with atypia    # cherry angiomas and lentigo.       Follow-up: 1 year(s) in-person, or earlier for new or changing lesions    Staff:     Lena Segura MD  ____________________________________________    CC: Skin Check (Has an area on his scalp he is concerned about. )    HPI:  Mr. Reggie Gomes is a(n) 79 year old male who presents today as a return patient for a skin check.  No tender or bleeding lesions.  Notes more brown spots on scalp    Patient is otherwise feeling well, without additional skin concerns.     Labs Reviewed:  N/A    Physical Exam:  Vitals: There were no vitals taken for this visit.  SKIN: Total skin excluding the undergarment areas but including buttocks was performed. The exam included the head/face, neck, both arms, chest, back, abdomen, both legs, digits and/or nails.   - medium to dark brown pigmented papule with no atypical pigment on dermoscopy  - cherry angiomas  - waxy stuck on papules  - lentigo  - scars with NERD   - No other lesions of concern on areas examined.     Medications:  Current Outpatient Medications   Medication Sig Dispense Refill    acetaminophen (TYLENOL) 500 MG tablet Take 2 tablets (1,000 mg) by mouth every 8 hours as needed for mild pain      aspirin (ASA) 81 MG chewable tablet Take 81 mg by mouth daily      atenolol (TENORMIN) 25 MG tablet Take 25 mg by mouth daily      atorvastatin (LIPITOR) 40 MG tablet  [ATORVASTATIN (LIPITOR) 40 MG TABLET] Take 40 mg by mouth every morning.      CALCIUM CARBONATE (CALCIUM 500 ORAL) [CALCIUM CARBONATE (CALCIUM 500 ORAL)] Take 1 tablet by mouth daily.      DULoxetine (CYMBALTA) 60 MG capsule Take 60 mg by mouth 2 times daily      gabapentin (NEURONTIN) 300 MG capsule Take 600 mg by mouth 3 times daily.      lisinopril (PRINIVIL,ZESTRIL) 5 MG tablet [LISINOPRIL (PRINIVIL,ZESTRIL) 5 MG TABLET] Take 1 tablet (5 mg total) by mouth daily. 90 tablet 0    omeprazole (PRILOSEC) 20 MG capsule [OMEPRAZOLE (PRILOSEC) 20 MG CAPSULE] Take 20 mg by mouth daily.      psyllium (METAMUCIL) 0.52 gram capsule Take 2.08 g by mouth daily 4 caps qday      tamsulosin (FLOMAX) 0.4 MG capsule Take 2 capsules (0.8 mg) by mouth daily      tiZANidine (ZANAFLEX) 2 MG tablet Take 2 mg by mouth 3 times daily as needed for muscle spasms      vitamin B-12 (CYANOCOBALAMIN) 1000 MCG tablet Take 1,000 mcg by mouth daily      miconazole (MICATIN) 2 % external powder Apply topically 2 times daily Apply to Groin (Patient not taking: Reported on 2/11/2025) 43 g 0    thiamine 50 MG TABS Take 50 mg by mouth daily Not sure of dose at this time. (Patient not taking: Reported on 2/11/2025)       No current facility-administered medications for this visit.      Past Medical History:   Patient Active Problem List   Diagnosis    Dyslipidemia    Coronary Artery Disease    Prostate cancer (H)    Hydrocele, right    Ventral incisional hernia    Hip pain    Osteoarthritis    OA (osteoarthritis) of hip    Cellulitis of left upper extremity    Abnormality of gait due to impairment of balance    Acromioclavicular joint arthritis    Cervical stenosis of spine    Acute bilateral low back pain without sciatica    Dyslipidemia    H/O heart artery stent    History of bladder cancer    History of prostate cancer    Right foot drop    Generalized muscle weakness    Lumbar radiculopathy    Complete tear of rotator cuff    Cough    Acute  cystitis without hematuria    Acute bronchitis due to other specified organisms    Ferrer catheter in place    Fever, unspecified fever cause    Spinal stenosis of lumbar region, unspecified whether neurogenic claudication present     Past Medical History:   Diagnosis Date    Arthritis     osteo    BPH (benign prostatic hyperplasia)     Cancer (H)     prostate and bladder    Coronary artery disease     GERD (gastroesophageal reflux disease)     Hydrocele     Hyperlipidemia     Hypertension     Lumbar spinal stenosis     Myocardial infarction (H) 2004    Prostate cancer (H)        CC Lena Segura MD  [address] [place] [address] [address]  [place] [address] on close of this encounter.

## 2025-02-11 NOTE — LETTER
2/11/2025       RE: Reggie Gomes  [address]  [place] [address]     Dear Colleague,    Thank you for referring your patient, Reggie Gomes, to the [facility] [place] at [facility]. Please see a copy of my visit note below.    [facility] Dermatology Note  Encounter Date: Feb 11, 2025  Office Visit     Dermatology Problem List:  #Right bicep-pigmented BCC s/p D&C 02/21/2020.  #Right frontal scalp-SCC in situ, biopsy 12/05/2019 s/p Mohs.   #Right superior scalp-BCC, biopsy 12/05/2019 s/p Mohs.   #AK to right post auricular region s/p cryotherapy    ____________________________________________    Assessment & Plan:     # Seborrheic keratoses- many on scalp but none with inflammation.  Discussed benign diagnosis    # History of skin cancer- NERD    # Nevi- none with atypia    # cherry angiomas and lentigo.       Follow-up: 1 year(s) in-person, or earlier for new or changing lesions    Staff:     Lena Segura MD  ____________________________________________    CC: Skin Check (Has an area on his scalp he is concerned about. )    HPI:  Mr. Reggie Gomes is a(n) 79 year old male who presents today as a return patient for a skin check.  No tender or bleeding lesions.  Notes more brown spots on scalp    Patient is otherwise feeling well, without additional skin concerns.     Labs Reviewed:  N/A    Physical Exam:  Vitals: There were no vitals taken for this visit.  SKIN: Total skin excluding the undergarment areas but including buttocks was performed. The exam included the head/face, neck, both arms, chest, back, abdomen, both legs, digits and/or nails.   - medium to dark brown pigmented papule with no atypical pigment on dermoscopy  - cherry angiomas  - waxy stuck on papules  - lentigo  - scars with NERD   - No other lesions of concern on areas examined.     Medications:  Current Outpatient Medications    Medication Sig Dispense Refill     acetaminophen (TYLENOL) 500 MG tablet Take 2 tablets (1,000 mg) by mouth every 8 hours as needed for mild pain       aspirin (ASA) 81 MG chewable tablet Take 81 mg by mouth daily       atenolol (TENORMIN) 25 MG tablet Take 25 mg by mouth daily       atorvastatin (LIPITOR) 40 MG tablet [ATORVASTATIN (LIPITOR) 40 MG TABLET] Take 40 mg by mouth every morning.       CALCIUM CARBONATE (CALCIUM 500 ORAL) [CALCIUM CARBONATE (CALCIUM 500 ORAL)] Take 1 tablet by mouth daily.       DULoxetine (CYMBALTA) 60 MG capsule Take 60 mg by mouth 2 times daily       gabapentin (NEURONTIN) 300 MG capsule Take 600 mg by mouth 3 times daily.       lisinopril (PRINIVIL,ZESTRIL) 5 MG tablet [LISINOPRIL (PRINIVIL,ZESTRIL) 5 MG TABLET] Take 1 tablet (5 mg total) by mouth daily. 90 tablet 0     omeprazole (PRILOSEC) 20 MG capsule [OMEPRAZOLE (PRILOSEC) 20 MG CAPSULE] Take 20 mg by mouth daily.       psyllium (METAMUCIL) 0.52 gram capsule Take 2.08 g by mouth daily 4 caps qday       tamsulosin (FLOMAX) 0.4 MG capsule Take 2 capsules (0.8 mg) by mouth daily       tiZANidine (ZANAFLEX) 2 MG tablet Take 2 mg by mouth 3 times daily as needed for muscle spasms       vitamin B-12 (CYANOCOBALAMIN) 1000 MCG tablet Take 1,000 mcg by mouth daily       miconazole (MICATIN) 2 % external powder Apply topically 2 times daily Apply to Groin (Patient not taking: Reported on 2/11/2025) 43 g 0     thiamine 50 MG TABS Take 50 mg by mouth daily Not sure of dose at this time. (Patient not taking: Reported on 2/11/2025)       No current facility-administered medications for this visit.      Past Medical History:   Patient Active Problem List   Diagnosis     Dyslipidemia     Coronary Artery Disease     Prostate cancer (H)     Hydrocele, right     Ventral incisional hernia     Hip pain     Osteoarthritis     OA (osteoarthritis) of hip     Cellulitis of left upper extremity     Abnormality of gait due to impairment of balance      Acromioclavicular joint arthritis     Cervical stenosis of spine     Acute bilateral low back pain without sciatica     Dyslipidemia     H/O heart artery stent     History of bladder cancer     History of prostate cancer     Right foot drop     Generalized muscle weakness     Lumbar radiculopathy     Complete tear of rotator cuff     Cough     Acute cystitis without hematuria     Acute bronchitis due to other specified organisms     Ferrer catheter in place     Fever, unspecified fever cause     Spinal stenosis of lumbar region, unspecified whether neurogenic claudication present     Past Medical History:   Diagnosis Date     Arthritis     osteo     BPH (benign prostatic hyperplasia)      Cancer (H)     prostate and bladder     Coronary artery disease      GERD (gastroesophageal reflux disease)      Hydrocele      Hyperlipidemia      Hypertension      Lumbar spinal stenosis      Myocardial infarction (H) 2004     Prostate cancer (H)        CC Lena Segura MD  [address] [facility] [address]  [place] [address] on close of this encounter.       Again, thank you for allowing me to participate in the care of your patient.      Sincerely,    Lena Segura MD

## 2025-02-11 NOTE — NURSING NOTE
Dermatology Rooming Note    Reggie Gomes's goals for this visit include:   Chief Complaint   Patient presents with    Skin Check     Has an area on his scalp he is concerned about.      Kuldip Black, EMT  Clinic Support  [facility]     [phone]    Employed by [employer]

## 2025-02-12 ENCOUNTER — TELEPHONE (OUTPATIENT)
Dept: DERMATOLOGY | Facility: CLINIC | Age: 80
End: 2025-02-12
Payer: MEDICARE

## 2025-02-12 NOTE — TELEPHONE ENCOUNTER
2/12 Left Voicemail (1st Attempt) and Sent Mychart (1st Attempt) for the patient to call back and schedule the following:    Appointment type: Return Dermatology  Provider: Colton  Return date: 2/11/2026  Specialty phone number: [phone]  Additional appointment(s) needed: n/a  Additonal Notes: n/a

## 2025-05-18 ENCOUNTER — HEALTH MAINTENANCE LETTER (OUTPATIENT)
Age: 80
End: 2025-05-18

## 2025-06-19 ENCOUNTER — THERAPY VISIT (OUTPATIENT)
Dept: PHYSICAL THERAPY | Facility: REHABILITATION | Age: 80
End: 2025-06-19
Payer: MEDICARE

## 2025-06-19 DIAGNOSIS — M48.061 SPINAL STENOSIS OF LUMBAR REGION WITHOUT NEUROGENIC CLAUDICATION: Primary | ICD-10-CM
